# Patient Record
Sex: FEMALE | Race: BLACK OR AFRICAN AMERICAN | NOT HISPANIC OR LATINO
[De-identification: names, ages, dates, MRNs, and addresses within clinical notes are randomized per-mention and may not be internally consistent; named-entity substitution may affect disease eponyms.]

---

## 2017-01-11 ENCOUNTER — APPOINTMENT (OUTPATIENT)
Dept: INTERNAL MEDICINE | Facility: CLINIC | Age: 72
End: 2017-01-11

## 2017-01-11 ENCOUNTER — RESULT REVIEW (OUTPATIENT)
Age: 72
End: 2017-01-11

## 2017-01-11 VITALS
BODY MASS INDEX: 40.65 KG/M2 | HEART RATE: 84 BPM | WEIGHT: 259 LBS | DIASTOLIC BLOOD PRESSURE: 78 MMHG | SYSTOLIC BLOOD PRESSURE: 138 MMHG | HEIGHT: 67 IN

## 2017-01-11 DIAGNOSIS — Z85.41 PERSONAL HISTORY OF MALIGNANT NEOPLASM OF CERVIX UTERI: ICD-10-CM

## 2017-01-12 ENCOUNTER — FORM ENCOUNTER (OUTPATIENT)
Age: 72
End: 2017-01-12

## 2017-01-12 LAB
CHOLEST SERPL-MCNC: 109 MG/DL
CREAT UR-MCNC: 170 MG/DL
ESTIMATED AVERGAGE GLUCOSE (NORTH): 123 MG/DL
HBA1C MFR BLD: 5.9 %
HDLC SERPL-MCNC: 47 MG/DL
HDLC SERPL: 2.32
LDLC SERPL DIRECT ASSAY-MCNC: 43 MG/DL
MICROALBUMIN UR-MCNC: 7 MG/DL
MICROALBUMIN/CREAT UR-RTO: 41 UG/MG
T4 FREE SERPL-MCNC: 1.4 NG/DL
TRIGL SERPL-MCNC: 157 MG/DL
TSH SERPL DL<=0.005 MIU/L-ACNC: 2.4 UIU/ML
VLDLC SERPL-MCNC: 31 MG/DL

## 2017-01-13 LAB
25(OH)D3 SERPL-MCNC: 31 NG/ML
VITAMIN D2 SERPL-MCNC: <4 NG/ML
VITAMIN D3 SERPL-MCNC: 31 NG/ML

## 2017-01-18 LAB
ALBUMIN SERPL-MCNC: 4.2 G/DL
ALBUMIN/GLOB SERPL: 1.27
ALP SERPL-CCNC: 70 IU/L
ALT SERPL-CCNC: 21 IU/L
ANION GAP SERPL CALC-SCNC: 7 MEQ/L
AST SERPL-CCNC: 19 IU/L
BASOPHILS # BLD: 0.05 TH/MM3
BASOPHILS NFR BLD: 0.6 %
BILIRUB SERPL-MCNC: 0.3 MG/DL
BUN SERPL-MCNC: 19 MG/DL
BUN/CREAT SERPL: 18.4 %
CALCIUM SERPL-MCNC: 9.6 MG/DL
CHLORIDE SERPL-SCNC: 112 MEQ/L
CO2 SERPL-SCNC: 23 MEQ/L
CREAT SERPL-MCNC: 1.03 MG/DL
EOSINOPHIL # BLD: 0.12 TH/MM3
EOSINOPHIL NFR BLD: 1.5 %
ERYTHROCYTE [DISTWIDTH] IN BLOOD BY AUTOMATED COUNT: 15.6 %
GFR SERPL CREATININE-BSD FRML MDRD: 64
GLUCOSE SERPL-MCNC: 112 MG/DL
GRANULOCYTES # BLD: 5.89 TH/MM3
GRANULOCYTES NFR BLD: 72.4 %
HCT VFR BLD AUTO: 33.1 %
HGB BLD-MCNC: 11.3 G/DL
IMM GRANULOCYTES # BLD: 0.02 TH/MM3
IMM GRANULOCYTES NFR BLD: 0.2 %
LYMPHOCYTES # BLD: 1.55 TH/MM3
LYMPHOCYTES NFR BLD: 19 %
MCH RBC QN AUTO: 25.5 PG
MCHC RBC AUTO-ENTMCNC: 34.1 G/DL
MCV RBC AUTO: 74.7 FL
MONOCYTES # BLD: 0.51 TH/MM3
MONOCYTES NFR BLD: 6.3 %
PLATELET # BLD: 232 TH/MM3
PMV BLD AUTO: 12.8 FL
POTASSIUM SERPL-SCNC: 4.7 MMOL/L
PROT SERPL-MCNC: 7.5 G/DL
RBC # BLD AUTO: 4.43 MIL/MM3
SODIUM SERPL-SCNC: 142 MEQ/L
WBC # BLD: 8.14 TH/MM3

## 2017-03-27 ENCOUNTER — APPOINTMENT (OUTPATIENT)
Dept: INTERNAL MEDICINE | Facility: CLINIC | Age: 72
End: 2017-03-27

## 2017-04-21 ENCOUNTER — OTHER (OUTPATIENT)
Age: 72
End: 2017-04-21

## 2017-04-21 ENCOUNTER — APPOINTMENT (OUTPATIENT)
Dept: INTERNAL MEDICINE | Facility: CLINIC | Age: 72
End: 2017-04-21

## 2017-04-21 ENCOUNTER — OUTPATIENT (OUTPATIENT)
Dept: OUTPATIENT SERVICES | Facility: HOSPITAL | Age: 72
LOS: 1 days | Discharge: HOME | End: 2017-04-21

## 2017-04-21 VITALS
HEIGHT: 67 IN | DIASTOLIC BLOOD PRESSURE: 82 MMHG | HEART RATE: 90 BPM | WEIGHT: 261 LBS | BODY MASS INDEX: 40.97 KG/M2 | SYSTOLIC BLOOD PRESSURE: 138 MMHG

## 2017-04-21 DIAGNOSIS — Z85.3 PERSONAL HISTORY OF MALIGNANT NEOPLASM OF BREAST: ICD-10-CM

## 2017-05-24 LAB
ANION GAP SERPL CALC-SCNC: 11 MEQ/L
BASOPHILS # BLD: 0.05 TH/MM3
BASOPHILS NFR BLD: 0.6 %
BUN SERPL-MCNC: 14 MG/DL
BUN/CREAT SERPL: 16.3 %
CALCIUM SERPL-MCNC: 9.6 MG/DL
CHLORIDE SERPL-SCNC: 113 MEQ/L
CHOLEST SERPL-MCNC: 131 MG/DL
CO2 SERPL-SCNC: 21 MEQ/L
CREAT SERPL-MCNC: 0.86 MG/DL
DIFFERENTIAL METHOD BLD: NORMAL
EOSINOPHIL # BLD: 0.08 TH/MM3
EOSINOPHIL NFR BLD: 1 %
ERYTHROCYTE [DISTWIDTH] IN BLOOD BY AUTOMATED COUNT: 16.6 %
ESTIMATED AVERGAGE GLUCOSE (NORTH): 123 MG/DL
GFR SERPL CREATININE-BSD FRML MDRD: 79
GLUCOSE SERPL-MCNC: 111 MG/DL
GRANULOCYTES # BLD: 5.74 TH/MM3
GRANULOCYTES NFR BLD: 74.3 %
HBA1C MFR BLD: 5.9 %
HCT VFR BLD AUTO: 35.1 %
HDLC SERPL-MCNC: 47 MG/DL
HDLC SERPL: 2.79
HEMOCCULT STL QL IA: NEGATIVE
HGB BLD-MCNC: 12 G/DL
IMM GRANULOCYTES # BLD: 0.03 TH/MM3
IMM GRANULOCYTES NFR BLD: 0.4 %
LDLC SERPL DIRECT ASSAY-MCNC: 55 MG/DL
LYMPHOCYTES # BLD: 1.29 TH/MM3
LYMPHOCYTES NFR BLD: 16.7 %
MCH RBC QN AUTO: 25.5 PG
MCHC RBC AUTO-ENTMCNC: 34.2 G/DL
MCV RBC AUTO: 74.7 FL
MONOCYTES # BLD: 0.54 TH/MM3
MONOCYTES NFR BLD: 7 %
PLATELET # BLD: 188 TH/MM3
PMV BLD AUTO: 12.9 FL
POTASSIUM SERPL-SCNC: 5 MMOL/L
RBC # BLD AUTO: 4.7 MIL/MM3
SODIUM SERPL-SCNC: 145 MEQ/L
TRIGL SERPL-MCNC: 234 MG/DL
VLDLC SERPL-MCNC: 46 MG/DL
WBC # BLD: 7.73 TH/MM3

## 2017-05-31 ENCOUNTER — APPOINTMENT (OUTPATIENT)
Dept: INTERNAL MEDICINE | Facility: CLINIC | Age: 72
End: 2017-05-31

## 2017-05-31 ENCOUNTER — OUTPATIENT (OUTPATIENT)
Dept: OUTPATIENT SERVICES | Facility: HOSPITAL | Age: 72
LOS: 1 days | Discharge: HOME | End: 2017-05-31

## 2017-05-31 VITALS
BODY MASS INDEX: 38.45 KG/M2 | WEIGHT: 245 LBS | HEIGHT: 67 IN | DIASTOLIC BLOOD PRESSURE: 80 MMHG | SYSTOLIC BLOOD PRESSURE: 141 MMHG | HEART RATE: 89 BPM

## 2017-06-28 DIAGNOSIS — E11.9 TYPE 2 DIABETES MELLITUS WITHOUT COMPLICATIONS: ICD-10-CM

## 2017-06-28 DIAGNOSIS — J45.998 OTHER ASTHMA: ICD-10-CM

## 2017-06-28 DIAGNOSIS — E78.4 OTHER HYPERLIPIDEMIA: ICD-10-CM

## 2017-07-14 ENCOUNTER — OUTPATIENT (OUTPATIENT)
Dept: OUTPATIENT SERVICES | Facility: HOSPITAL | Age: 72
LOS: 1 days | Discharge: HOME | End: 2017-07-14

## 2017-07-14 DIAGNOSIS — R92.8 OTHER ABNORMAL AND INCONCLUSIVE FINDINGS ON DIAGNOSTIC IMAGING OF BREAST: ICD-10-CM

## 2017-09-01 ENCOUNTER — APPOINTMENT (OUTPATIENT)
Dept: INTERNAL MEDICINE | Facility: CLINIC | Age: 72
End: 2017-09-01

## 2017-09-01 ENCOUNTER — OUTPATIENT (OUTPATIENT)
Dept: OUTPATIENT SERVICES | Facility: HOSPITAL | Age: 72
LOS: 1 days | Discharge: HOME | End: 2017-09-01

## 2017-09-01 VITALS
BODY MASS INDEX: 40.34 KG/M2 | WEIGHT: 257 LBS | DIASTOLIC BLOOD PRESSURE: 72 MMHG | SYSTOLIC BLOOD PRESSURE: 136 MMHG | HEART RATE: 93 BPM | HEIGHT: 67 IN

## 2017-09-01 DIAGNOSIS — E66.9 OBESITY, UNSPECIFIED: ICD-10-CM

## 2017-09-01 DIAGNOSIS — M81.0 AGE-RELATED OSTEOPOROSIS W/OUT CURRENT PATHOLOGICAL FRACTURE: ICD-10-CM

## 2017-09-01 DIAGNOSIS — R09.82 POSTNASAL DRIP: ICD-10-CM

## 2017-09-05 DIAGNOSIS — I10 ESSENTIAL (PRIMARY) HYPERTENSION: ICD-10-CM

## 2017-09-05 DIAGNOSIS — J45.998 OTHER ASTHMA: ICD-10-CM

## 2017-09-05 DIAGNOSIS — J30.9 ALLERGIC RHINITIS, UNSPECIFIED: ICD-10-CM

## 2017-11-28 DIAGNOSIS — C50.919 MALIGNANT NEOPLASM OF UNSPECIFIED SITE OF UNSPECIFIED FEMALE BREAST: ICD-10-CM

## 2017-11-28 DIAGNOSIS — E78.4 OTHER HYPERLIPIDEMIA: ICD-10-CM

## 2017-12-01 ENCOUNTER — APPOINTMENT (OUTPATIENT)
Dept: INTERNAL MEDICINE | Facility: CLINIC | Age: 72
End: 2017-12-01

## 2018-01-22 ENCOUNTER — INPATIENT (INPATIENT)
Facility: HOSPITAL | Age: 73
LOS: 2 days | Discharge: HOME | End: 2018-01-25
Attending: INTERNAL MEDICINE

## 2018-01-30 DIAGNOSIS — E83.42 HYPOMAGNESEMIA: ICD-10-CM

## 2018-01-30 DIAGNOSIS — K56.690 OTHER PARTIAL INTESTINAL OBSTRUCTION: ICD-10-CM

## 2018-01-30 DIAGNOSIS — Z85.3 PERSONAL HISTORY OF MALIGNANT NEOPLASM OF BREAST: ICD-10-CM

## 2018-01-30 DIAGNOSIS — C79.51 SECONDARY MALIGNANT NEOPLASM OF BONE: ICD-10-CM

## 2018-01-30 DIAGNOSIS — M81.0 AGE-RELATED OSTEOPOROSIS WITHOUT CURRENT PATHOLOGICAL FRACTURE: ICD-10-CM

## 2018-01-30 DIAGNOSIS — N18.3 CHRONIC KIDNEY DISEASE, STAGE 3 (MODERATE): ICD-10-CM

## 2018-01-30 DIAGNOSIS — N88.8 OTHER SPECIFIED NONINFLAMMATORY DISORDERS OF CERVIX UTERI: ICD-10-CM

## 2018-01-30 DIAGNOSIS — N13.30 UNSPECIFIED HYDRONEPHROSIS: ICD-10-CM

## 2018-01-30 DIAGNOSIS — J44.1 CHRONIC OBSTRUCTIVE PULMONARY DISEASE WITH (ACUTE) EXACERBATION: ICD-10-CM

## 2018-01-30 DIAGNOSIS — R11.10 VOMITING, UNSPECIFIED: ICD-10-CM

## 2018-01-30 DIAGNOSIS — E66.01 MORBID (SEVERE) OBESITY DUE TO EXCESS CALORIES: ICD-10-CM

## 2018-01-30 DIAGNOSIS — E78.5 HYPERLIPIDEMIA, UNSPECIFIED: ICD-10-CM

## 2018-01-30 DIAGNOSIS — E83.51 HYPOCALCEMIA: ICD-10-CM

## 2018-01-30 DIAGNOSIS — E11.9 TYPE 2 DIABETES MELLITUS WITHOUT COMPLICATIONS: ICD-10-CM

## 2018-01-30 DIAGNOSIS — N17.9 ACUTE KIDNEY FAILURE, UNSPECIFIED: ICD-10-CM

## 2018-02-04 DIAGNOSIS — N17.9 ACUTE KIDNEY FAILURE, UNSPECIFIED: ICD-10-CM

## 2018-02-04 DIAGNOSIS — R19.00 INTRA-ABDOMINAL AND PELVIC SWELLING, MASS AND LUMP, UNSPECIFIED SITE: ICD-10-CM

## 2018-02-04 DIAGNOSIS — R11.10 VOMITING, UNSPECIFIED: ICD-10-CM

## 2018-02-14 ENCOUNTER — APPOINTMENT (OUTPATIENT)
Dept: INTERNAL MEDICINE | Facility: CLINIC | Age: 73
End: 2018-02-14

## 2018-02-14 ENCOUNTER — OUTPATIENT (OUTPATIENT)
Dept: OUTPATIENT SERVICES | Facility: HOSPITAL | Age: 73
LOS: 1 days | Discharge: HOME | End: 2018-02-14

## 2018-02-14 VITALS
HEART RATE: 108 BPM | HEIGHT: 67 IN | DIASTOLIC BLOOD PRESSURE: 83 MMHG | SYSTOLIC BLOOD PRESSURE: 150 MMHG | BODY MASS INDEX: 39.71 KG/M2 | WEIGHT: 253 LBS

## 2018-02-15 DIAGNOSIS — I10 ESSENTIAL (PRIMARY) HYPERTENSION: ICD-10-CM

## 2018-02-15 DIAGNOSIS — J44.9 CHRONIC OBSTRUCTIVE PULMONARY DISEASE, UNSPECIFIED: ICD-10-CM

## 2018-02-15 DIAGNOSIS — C53.9 MALIGNANT NEOPLASM OF CERVIX UTERI, UNSPECIFIED: ICD-10-CM

## 2018-02-21 ENCOUNTER — OUTPATIENT (OUTPATIENT)
Dept: OUTPATIENT SERVICES | Facility: HOSPITAL | Age: 73
LOS: 1 days | Discharge: HOME | End: 2018-02-21

## 2018-02-21 VITALS
OXYGEN SATURATION: 100 % | RESPIRATION RATE: 16 BRPM | SYSTOLIC BLOOD PRESSURE: 142 MMHG | HEART RATE: 82 BPM | HEIGHT: 67 IN | DIASTOLIC BLOOD PRESSURE: 73 MMHG | TEMPERATURE: 98 F | WEIGHT: 263.01 LBS

## 2018-02-21 DIAGNOSIS — C79.51 SECONDARY MALIGNANT NEOPLASM OF BONE: ICD-10-CM

## 2018-02-21 DIAGNOSIS — Z98.890 OTHER SPECIFIED POSTPROCEDURAL STATES: Chronic | ICD-10-CM

## 2018-02-21 DIAGNOSIS — R92.8 OTHER ABNORMAL AND INCONCLUSIVE FINDINGS ON DIAGNOSTIC IMAGING OF BREAST: ICD-10-CM

## 2018-02-21 DIAGNOSIS — Z01.818 ENCOUNTER FOR OTHER PREPROCEDURAL EXAMINATION: ICD-10-CM

## 2018-02-21 LAB
ALBUMIN SERPL ELPH-MCNC: 4.2 G/DL — SIGNIFICANT CHANGE UP (ref 3–5.5)
ALP SERPL-CCNC: 112 U/L — SIGNIFICANT CHANGE UP (ref 30–115)
ALT FLD-CCNC: 31 U/L — SIGNIFICANT CHANGE UP (ref 0–41)
ANION GAP SERPL CALC-SCNC: 9 MMOL/L — SIGNIFICANT CHANGE UP (ref 7–14)
APTT BLD: 25.5 SEC — LOW (ref 27–39.2)
AST SERPL-CCNC: 20 U/L — SIGNIFICANT CHANGE UP (ref 0–41)
BASOPHILS # BLD AUTO: 0.03 K/UL — SIGNIFICANT CHANGE UP (ref 0–0.2)
BASOPHILS NFR BLD AUTO: 0.5 % — SIGNIFICANT CHANGE UP (ref 0–1)
BILIRUB SERPL-MCNC: 0.3 MG/DL — SIGNIFICANT CHANGE UP (ref 0.2–1.2)
BUN SERPL-MCNC: 22 MG/DL — HIGH (ref 10–20)
CALCIUM SERPL-MCNC: 9.3 MG/DL — SIGNIFICANT CHANGE UP (ref 8.5–10.1)
CHLORIDE SERPL-SCNC: 111 MMOL/L — HIGH (ref 98–110)
CO2 SERPL-SCNC: 22 MMOL/L — SIGNIFICANT CHANGE UP (ref 17–32)
CREAT SERPL-MCNC: 1.1 MG/DL — SIGNIFICANT CHANGE UP (ref 0.7–1.5)
EOSINOPHIL # BLD AUTO: 0.06 K/UL — SIGNIFICANT CHANGE UP (ref 0–0.7)
EOSINOPHIL NFR BLD AUTO: 0.9 % — SIGNIFICANT CHANGE UP (ref 0–8)
GLUCOSE SERPL-MCNC: 106 MG/DL — SIGNIFICANT CHANGE UP (ref 70–110)
HCT VFR BLD CALC: 31.5 % — LOW (ref 37–47)
HGB BLD-MCNC: 10.7 G/DL — LOW (ref 14–18)
IMM GRANULOCYTES NFR BLD AUTO: 1.1 % — HIGH (ref 0.1–0.3)
INR BLD: 1 RATIO — SIGNIFICANT CHANGE UP (ref 0.65–1.3)
LYMPHOCYTES # BLD AUTO: 1.47 K/UL — SIGNIFICANT CHANGE UP (ref 1.2–3.4)
LYMPHOCYTES # BLD AUTO: 22.4 % — SIGNIFICANT CHANGE UP (ref 20.5–51.1)
MCHC RBC-ENTMCNC: 24.4 PG — LOW (ref 27–31)
MCHC RBC-ENTMCNC: 34 G/DL — SIGNIFICANT CHANGE UP (ref 32–37)
MCV RBC AUTO: 71.9 FL — LOW (ref 81–91)
MONOCYTES # BLD AUTO: 0.78 K/UL — HIGH (ref 0.1–0.6)
MONOCYTES NFR BLD AUTO: 11.9 % — HIGH (ref 1.7–9.3)
NEUTROPHILS # BLD AUTO: 4.16 K/UL — SIGNIFICANT CHANGE UP (ref 1.4–6.5)
NEUTROPHILS NFR BLD AUTO: 63.2 % — SIGNIFICANT CHANGE UP (ref 42.2–75.2)
NRBC # BLD: 0 /100 WBCS — SIGNIFICANT CHANGE UP (ref 0–0)
PLATELET # BLD AUTO: 236 K/UL — SIGNIFICANT CHANGE UP (ref 130–400)
POTASSIUM SERPL-MCNC: 4.2 MMOL/L — SIGNIFICANT CHANGE UP (ref 3.5–5)
POTASSIUM SERPL-SCNC: 4.2 MMOL/L — SIGNIFICANT CHANGE UP (ref 3.5–5)
PROT SERPL-MCNC: 7.8 G/DL — SIGNIFICANT CHANGE UP (ref 6–8)
PROTHROM AB SERPL-ACNC: 10.8 SEC — SIGNIFICANT CHANGE UP (ref 9.95–12.87)
RBC # BLD: 4.38 M/UL — SIGNIFICANT CHANGE UP (ref 4.2–5.4)
RBC # FLD: 15.9 % — HIGH (ref 11.5–14.5)
SODIUM SERPL-SCNC: 142 MMOL/L — SIGNIFICANT CHANGE UP (ref 135–146)
WBC # BLD: 6.57 K/UL — SIGNIFICANT CHANGE UP (ref 4.8–10.8)
WBC # FLD AUTO: 6.57 K/UL — SIGNIFICANT CHANGE UP (ref 4.8–10.8)

## 2018-02-21 NOTE — H&P PST ADULT - FAMILY HISTORY
Mother  Still living? No  Family history of breast cancer, Age at diagnosis: Age Unknown     Sibling  Still living? No  Family history of breast cancer, Age at diagnosis: Age Unknown

## 2018-02-21 NOTE — H&P PST ADULT - REASON FOR ADMISSION
73 yo female presents with HX CERVICAL& BREAST CANCERS, diagnosis of pelvic mass, now they ARE DOING SOME KIND OF BIOPSY

## 2018-02-21 NOTE — H&P PST ADULT - PMH
Asthma  last attack 1/2018- hospitalized& steroids  Breast cancer  left, radiation ~2 yrs ago  Cervical cancer  radiation& chemo ~15 yrs ago  COPD (chronic obstructive pulmonary disease)    Diabetes    Hypertension    Obesity  bmi 41  Osteoporosis

## 2018-03-27 ENCOUNTER — APPOINTMENT (OUTPATIENT)
Dept: INTERNAL MEDICINE | Facility: CLINIC | Age: 73
End: 2018-03-27

## 2018-03-27 ENCOUNTER — OUTPATIENT (OUTPATIENT)
Dept: OUTPATIENT SERVICES | Facility: HOSPITAL | Age: 73
LOS: 1 days | Discharge: HOME | End: 2018-03-27

## 2018-03-27 VITALS
HEIGHT: 67 IN | DIASTOLIC BLOOD PRESSURE: 81 MMHG | HEART RATE: 105 BPM | WEIGHT: 255 LBS | SYSTOLIC BLOOD PRESSURE: 146 MMHG | BODY MASS INDEX: 40.02 KG/M2

## 2018-03-27 DIAGNOSIS — Z98.890 OTHER SPECIFIED POSTPROCEDURAL STATES: Chronic | ICD-10-CM

## 2018-03-28 DIAGNOSIS — Z01.818 ENCOUNTER FOR OTHER PREPROCEDURAL EXAMINATION: ICD-10-CM

## 2018-03-28 DIAGNOSIS — R19.00 INTRA-ABDOMINAL AND PELVIC SWELLING, MASS AND LUMP, UNSPECIFIED SITE: ICD-10-CM

## 2018-03-28 DIAGNOSIS — I10 ESSENTIAL (PRIMARY) HYPERTENSION: ICD-10-CM

## 2018-05-08 ENCOUNTER — RX RENEWAL (OUTPATIENT)
Age: 73
End: 2018-05-08

## 2018-05-29 ENCOUNTER — FORM ENCOUNTER (OUTPATIENT)
Age: 73
End: 2018-05-29

## 2018-05-30 ENCOUNTER — APPOINTMENT (OUTPATIENT)
Dept: INTERNAL MEDICINE | Facility: CLINIC | Age: 73
End: 2018-05-30

## 2018-05-30 ENCOUNTER — OUTPATIENT (OUTPATIENT)
Dept: OUTPATIENT SERVICES | Facility: HOSPITAL | Age: 73
LOS: 1 days | Discharge: HOME | End: 2018-05-30

## 2018-05-30 VITALS
BODY MASS INDEX: 39.16 KG/M2 | SYSTOLIC BLOOD PRESSURE: 123 MMHG | DIASTOLIC BLOOD PRESSURE: 73 MMHG | HEART RATE: 101 BPM | WEIGHT: 250 LBS

## 2018-05-30 VITALS — HEIGHT: 67 IN

## 2018-05-30 DIAGNOSIS — Z98.890 OTHER SPECIFIED POSTPROCEDURAL STATES: Chronic | ICD-10-CM

## 2018-05-30 DIAGNOSIS — R19.00 INTRA-ABDOMINAL AND PELVIC SWELLING, MASS AND LUMP, UNSPECIFIED SITE: ICD-10-CM

## 2018-05-30 DIAGNOSIS — M79.643 PAIN IN UNSPECIFIED HAND: ICD-10-CM

## 2018-05-30 DIAGNOSIS — R01.1 CARDIAC MURMUR, UNSPECIFIED: ICD-10-CM

## 2018-05-30 DIAGNOSIS — M79.642 PAIN IN LEFT HAND: ICD-10-CM

## 2018-05-30 DIAGNOSIS — C50.919 MALIGNANT NEOPLASM OF UNSPECIFIED SITE OF UNSPECIFIED FEMALE BREAST: ICD-10-CM

## 2018-05-30 NOTE — HISTORY OF PRESENT ILLNESS
[FreeTextEntry1] : follow up [de-identified] : 72 F with PMH of DM II, COPD, DLD, HTN, osteoporosis presents for follow up. Since her last visit she was due to have a pelvic mass biopsy which was cancelled and she currently states she does not want to have it done anymore. Otherwise she states she feels well.\par spoke with patient at length agreed to go back to gyn

## 2018-05-30 NOTE — ASSESSMENT
[FreeTextEntry1] : 72F with PMH of COPD, DLD, DM II, HTN, Breast Cancer s/p resection and RT presents for follow up.\par \par Left Hand pain with tenderness over the middle digit at the metacarpophalangeal joint with reduced ROM due to pain\par check hand x-ray\par pain control with tylenol as needed\par \par Breast Ca\par mammogram and US\par \par HTN\par continue enalapril and amlodipine\par \par DLD\par continue simvastatin\par check lipid profile\par \par DM II\par continue metformin\par check hba1c\par \par Osteoporosis\par continue alendronate\par \par Follow up in 1 month.

## 2018-06-06 LAB
CHOLEST SERPL-MCNC: 118 MG/DL
CHOLEST/HDLC SERPL: 2.7 RATIO
CREAT SPEC-SCNC: 184 MG/DL
ESTIMATED AVERAGE GLUCOSE: 126 MG/DL
HBA1C MFR BLD HPLC: 6 %
HDLC SERPL-MCNC: 43 MG/DL
LDLC SERPL CALC-MCNC: 35 MG/DL
MICROALBUMIN 24H UR DL<=1MG/L-MCNC: 37.6 MG/DL
MICROALBUMIN/CREAT 24H UR-RTO: 204 MG/G
TRIGL SERPL-MCNC: 289 MG/DL

## 2018-06-20 ENCOUNTER — APPOINTMENT (OUTPATIENT)
Dept: INTERNAL MEDICINE | Facility: CLINIC | Age: 73
End: 2018-06-20

## 2018-06-20 ENCOUNTER — OUTPATIENT (OUTPATIENT)
Dept: OUTPATIENT SERVICES | Facility: HOSPITAL | Age: 73
LOS: 1 days | Discharge: HOME | End: 2018-06-20

## 2018-06-20 VITALS
HEIGHT: 67 IN | TEMPERATURE: 97.7 F | SYSTOLIC BLOOD PRESSURE: 154 MMHG | HEART RATE: 89 BPM | WEIGHT: 247 LBS | DIASTOLIC BLOOD PRESSURE: 85 MMHG | BODY MASS INDEX: 38.77 KG/M2

## 2018-06-20 DIAGNOSIS — Z98.890 OTHER SPECIFIED POSTPROCEDURAL STATES: Chronic | ICD-10-CM

## 2018-06-20 NOTE — HISTORY OF PRESENT ILLNESS
[de-identified] : 72 F with PMH of DM II, COPD, DLD, HTN, osteoporosis presents for follow up after one month. Denies other complaints.

## 2018-06-20 NOTE — PHYSICAL EXAM
[No Acute Distress] : no acute distress [Normal Sclera/Conjunctiva] : normal sclera/conjunctiva [Normal Oropharynx] : the oropharynx was normal [Supple] : supple [No Respiratory Distress] : no respiratory distress  [Clear to Auscultation] : lungs were clear to auscultation bilaterally [No Accessory Muscle Use] : no accessory muscle use [Normal Rate] : normal rate  [Regular Rhythm] : with a regular rhythm [Normal S1, S2] : normal S1 and S2 [No Murmur] : no murmur heard [Soft] : abdomen soft [Non Tender] : non-tender [Non-distended] : non-distended [No Masses] : no abdominal mass palpated [No HSM] : no HSM [Normal Bowel Sounds] : normal bowel sounds [No CVA Tenderness] : no CVA  tenderness [Normal Gait] : normal gait [Coordination Grossly Intact] : coordination grossly intact [No Focal Deficits] : no focal deficits [Normal Affect] : the affect was normal [Normal Insight/Judgement] : insight and judgment were intact

## 2018-06-21 DIAGNOSIS — C50.919 MALIGNANT NEOPLASM OF UNSPECIFIED SITE OF UNSPECIFIED FEMALE BREAST: ICD-10-CM

## 2018-06-21 DIAGNOSIS — E78.5 HYPERLIPIDEMIA, UNSPECIFIED: ICD-10-CM

## 2018-06-21 DIAGNOSIS — N85.9 NONINFLAMMATORY DISORDER OF UTERUS, UNSPECIFIED: ICD-10-CM

## 2018-07-17 PROBLEM — E66.9 OBESITY, UNSPECIFIED: Chronic | Status: ACTIVE | Noted: 2018-02-21

## 2018-08-16 PROBLEM — J44.9 CHRONIC OBSTRUCTIVE PULMONARY DISEASE, UNSPECIFIED: Chronic | Status: ACTIVE | Noted: 2018-02-21

## 2018-08-16 PROBLEM — M81.0 AGE-RELATED OSTEOPOROSIS WITHOUT CURRENT PATHOLOGICAL FRACTURE: Chronic | Status: ACTIVE | Noted: 2018-02-21

## 2018-08-16 PROBLEM — I10 ESSENTIAL (PRIMARY) HYPERTENSION: Chronic | Status: ACTIVE | Noted: 2018-02-21

## 2018-08-16 PROBLEM — C53.9 MALIGNANT NEOPLASM OF CERVIX UTERI, UNSPECIFIED: Chronic | Status: ACTIVE | Noted: 2018-02-21

## 2018-08-16 PROBLEM — E11.9 TYPE 2 DIABETES MELLITUS WITHOUT COMPLICATIONS: Chronic | Status: ACTIVE | Noted: 2018-02-21

## 2018-08-16 PROBLEM — C50.919 MALIGNANT NEOPLASM OF UNSPECIFIED SITE OF UNSPECIFIED FEMALE BREAST: Chronic | Status: ACTIVE | Noted: 2018-02-21

## 2018-08-16 PROBLEM — J45.909 UNSPECIFIED ASTHMA, UNCOMPLICATED: Chronic | Status: ACTIVE | Noted: 2018-02-21

## 2018-08-22 ENCOUNTER — OUTPATIENT (OUTPATIENT)
Dept: OUTPATIENT SERVICES | Facility: HOSPITAL | Age: 73
LOS: 1 days | Discharge: HOME | End: 2018-08-22

## 2018-08-22 DIAGNOSIS — Z98.890 OTHER SPECIFIED POSTPROCEDURAL STATES: Chronic | ICD-10-CM

## 2018-08-22 DIAGNOSIS — R92.8 OTHER ABNORMAL AND INCONCLUSIVE FINDINGS ON DIAGNOSTIC IMAGING OF BREAST: ICD-10-CM

## 2018-09-21 ENCOUNTER — APPOINTMENT (OUTPATIENT)
Dept: INTERNAL MEDICINE | Facility: CLINIC | Age: 73
End: 2018-09-21

## 2018-11-09 ENCOUNTER — APPOINTMENT (OUTPATIENT)
Dept: INTERNAL MEDICINE | Facility: CLINIC | Age: 73
End: 2018-11-09

## 2018-11-28 RX ORDER — ALENDRONATE SODIUM 70 MG/1
70 TABLET ORAL
Qty: 4 | Refills: 3 | Status: ACTIVE | COMMUNITY
Start: 2018-09-07 | End: 1900-01-01

## 2018-12-06 ENCOUNTER — OUTPATIENT (OUTPATIENT)
Dept: OUTPATIENT SERVICES | Facility: HOSPITAL | Age: 73
LOS: 1 days | Discharge: HOME | End: 2018-12-06

## 2018-12-06 ENCOUNTER — APPOINTMENT (OUTPATIENT)
Dept: INTERNAL MEDICINE | Facility: CLINIC | Age: 73
End: 2018-12-06

## 2018-12-06 VITALS
WEIGHT: 220 LBS | DIASTOLIC BLOOD PRESSURE: 78 MMHG | HEART RATE: 105 BPM | BODY MASS INDEX: 34.53 KG/M2 | SYSTOLIC BLOOD PRESSURE: 132 MMHG | TEMPERATURE: 97.6 F | HEIGHT: 67 IN

## 2018-12-06 DIAGNOSIS — C50.919 MALIGNANT NEOPLASM OF UNSPECIFIED SITE OF UNSPECIFIED FEMALE BREAST: ICD-10-CM

## 2018-12-06 DIAGNOSIS — Z98.890 OTHER SPECIFIED POSTPROCEDURAL STATES: Chronic | ICD-10-CM

## 2018-12-06 DIAGNOSIS — C55 MALIGNANT NEOPLASM OF UTERUS, PART UNSPECIFIED: ICD-10-CM

## 2018-12-06 DIAGNOSIS — J44.9 CHRONIC OBSTRUCTIVE PULMONARY DISEASE, UNSPECIFIED: ICD-10-CM

## 2018-12-06 NOTE — ASSESSMENT
[FreeTextEntry1] : 72 F with PMH of DM II, COPD, DLD, HTN, osteoporosis presents for follow up appointment. \par no present complaints.\par mentions that, patient was diagnosed with uterine cancer and started on neoadjuvant chemotherapy. ( Paclitaxel and carboplatin).\par \par # Uterine cancer: \par -continue chemotherapy\par - f/u with oncology\par - f/u with gyn\par - get the records from [facility]\par \par # Breast cancer: \par - repeat diagnostic mammogram and diagnostic SONO breast b/l\par \par # HTN:\par - refill sent.\par - htn well controlled\par \par # DLD\par - refill sent\par - f/u lipid panel\par \par #DM2\par - f/u A1C\par - cw with current meds\par \par HCM:\par refuses flu shot\par f/u in 3 months\par \par \par \par

## 2018-12-06 NOTE — PHYSICAL EXAM
[No Acute Distress] : no acute distress [Normal Sclera/Conjunctiva] : normal sclera/conjunctiva [Normal Oropharynx] : the oropharynx was normal [Supple] : supple [No Respiratory Distress] : no respiratory distress  [Clear to Auscultation] : lungs were clear to auscultation bilaterally [No Accessory Muscle Use] : no accessory muscle use [Normal Rate] : normal rate  [Regular Rhythm] : with a regular rhythm [Normal S1, S2] : normal S1 and S2 [No Murmur] : no murmur heard [Soft] : abdomen soft [Non Tender] : non-tender [Normal Bowel Sounds] : normal bowel sounds [No CVA Tenderness] : no CVA  tenderness [Normal Gait] : normal gait [Coordination Grossly Intact] : coordination grossly intact [No Focal Deficits] : no focal deficits [Normal Affect] : the affect was normal [Normal Insight/Judgement] : insight and judgment were intact [de-identified] : distended lower belly

## 2018-12-06 NOTE — HISTORY OF PRESENT ILLNESS
[de-identified] : 72 F with PMH of DM II, COPD, DLD, HTN, osteoporosis presents for follow up appointment. \par no present complaints.\par mentions that, patient was diagnosed with uterine cancer and started on neoadjuvant chemotherapy. ( Paclitaxel and carboplatin).

## 2018-12-06 NOTE — PLAN
[FreeTextEntry1] : 72F with PMH of COPD, DLD, DM II, HTN, Breast Cancer s/p resection and RT presents for follow up.\par \par #)As per patient she had bone scan CT scan abd which was done outside oncology office Dr. Paris spoke with oncology over phone plan to do biopsy for pelvic mass at [facility] Dr. Cerrato they will refer pt to gyn/onc or IR for biopsy asked Dr Cerrato office to fax results\par -Bone scan showed heterogenous activity in thoracolumbar spine likely metastatic disease.\par \par #)Left Hand pain resolved\par Xray showed basal joint,mild PIP, DIP degenerative changes\par \par #)Breast Ca\par repeat mammogram and US in August 2018.\par \par #)HTN repeat BP is 135/80\par continue enalapril and amlodipine\par \par #)DLD\par continue simvastatin\par LDL is 35, \par \par #)DM II\par continue metformin, Aspirin\par HbA1c is 6.0\par Increased microalbumin to creatinine ratio c/w enalapril\par \par #)Osteoporosis\par continue alendronate\par \par Follow up in 3 month. \par

## 2019-01-01 ENCOUNTER — OUTPATIENT (OUTPATIENT)
Dept: OUTPATIENT SERVICES | Facility: HOSPITAL | Age: 74
LOS: 1 days | Discharge: HOME | End: 2019-01-01

## 2019-01-01 ENCOUNTER — APPOINTMENT (OUTPATIENT)
Dept: INTERNAL MEDICINE | Facility: CLINIC | Age: 74
End: 2019-01-01
Payer: MEDICARE

## 2019-01-01 ENCOUNTER — APPOINTMENT (OUTPATIENT)
Dept: INTERNAL MEDICINE | Facility: CLINIC | Age: 74
End: 2019-01-01

## 2019-01-01 VITALS
BODY MASS INDEX: 28.88 KG/M2 | SYSTOLIC BLOOD PRESSURE: 124 MMHG | HEIGHT: 67 IN | DIASTOLIC BLOOD PRESSURE: 66 MMHG | WEIGHT: 184 LBS | HEART RATE: 105 BPM

## 2019-01-01 DIAGNOSIS — Z98.890 OTHER SPECIFIED POSTPROCEDURAL STATES: Chronic | ICD-10-CM

## 2019-01-01 DIAGNOSIS — I10 ESSENTIAL (PRIMARY) HYPERTENSION: ICD-10-CM

## 2019-01-01 DIAGNOSIS — M54.5 LOW BACK PAIN: ICD-10-CM

## 2019-01-01 DIAGNOSIS — Z85.3 PERSONAL HISTORY OF MALIGNANT NEOPLASM OF BREAST: ICD-10-CM

## 2019-01-01 DIAGNOSIS — R00.0 TACHYCARDIA, UNSPECIFIED: ICD-10-CM

## 2019-01-01 DIAGNOSIS — J45.909 UNSPECIFIED ASTHMA, UNCOMPLICATED: ICD-10-CM

## 2019-01-01 DIAGNOSIS — C55 MALIGNANT NEOPLASM OF UTERUS, PART UNSPECIFIED: ICD-10-CM

## 2019-01-01 DIAGNOSIS — Z85.41 PERSONAL HISTORY OF MALIGNANT NEOPLASM OF CERVIX UTERI: ICD-10-CM

## 2019-01-01 DIAGNOSIS — G89.29 LOW BACK PAIN: ICD-10-CM

## 2019-01-01 DIAGNOSIS — K56.609 UNSPECIFIED INTESTINAL OBSTRUCTION, UNSPECIFIED AS TO PARTIAL VERSUS COMPLETE OBSTRUCTION: ICD-10-CM

## 2019-01-01 PROCEDURE — 99213 OFFICE O/P EST LOW 20 MIN: CPT | Mod: GC

## 2019-01-01 RX ORDER — APIXABAN 5 MG/1
5 TABLET, FILM COATED ORAL
Qty: 60 | Refills: 1 | Status: ACTIVE | COMMUNITY

## 2019-02-21 ENCOUNTER — FORM ENCOUNTER (OUTPATIENT)
Age: 74
End: 2019-02-21

## 2019-02-22 ENCOUNTER — LABORATORY RESULT (OUTPATIENT)
Age: 74
End: 2019-02-22

## 2019-02-22 ENCOUNTER — OUTPATIENT (OUTPATIENT)
Dept: OUTPATIENT SERVICES | Facility: HOSPITAL | Age: 74
LOS: 1 days | Discharge: HOME | End: 2019-02-22

## 2019-02-22 DIAGNOSIS — Z98.890 OTHER SPECIFIED POSTPROCEDURAL STATES: Chronic | ICD-10-CM

## 2019-02-22 DIAGNOSIS — R92.8 OTHER ABNORMAL AND INCONCLUSIVE FINDINGS ON DIAGNOSTIC IMAGING OF BREAST: ICD-10-CM

## 2019-02-27 LAB
ALBUMIN SERPL ELPH-MCNC: 4 G/DL
ALP BLD-CCNC: 130 U/L
ALT SERPL-CCNC: 21 U/L
ANION GAP SERPL CALC-SCNC: 18 MMOL/L
AST SERPL-CCNC: 16 U/L
BILIRUB SERPL-MCNC: 0.4 MG/DL
BUN SERPL-MCNC: 18 MG/DL
CALCIUM SERPL-MCNC: 9 MG/DL
CHLORIDE SERPL-SCNC: 107 MMOL/L
CHOLEST SERPL-MCNC: 85 MG/DL
CHOLEST/HDLC SERPL: 2.6 RATIO
CO2 SERPL-SCNC: 15 MMOL/L
CREAT SERPL-MCNC: 1.1 MG/DL
GLUCOSE SERPL-MCNC: 90 MG/DL
HDLC SERPL-MCNC: 33 MG/DL
LDLC SERPL CALC-MCNC: 26 MG/DL
POTASSIUM SERPL-SCNC: 4.7 MMOL/L
PROT SERPL-MCNC: 7.5 G/DL
SODIUM SERPL-SCNC: 140 MMOL/L
TRIGL SERPL-MCNC: 192 MG/DL

## 2019-03-06 ENCOUNTER — EMERGENCY (EMERGENCY)
Facility: HOSPITAL | Age: 74
LOS: 0 days | Discharge: HOME | End: 2019-03-06
Attending: EMERGENCY MEDICINE | Admitting: EMERGENCY MEDICINE

## 2019-03-06 ENCOUNTER — APPOINTMENT (OUTPATIENT)
Dept: INTERNAL MEDICINE | Facility: CLINIC | Age: 74
End: 2019-03-06

## 2019-03-06 ENCOUNTER — OUTPATIENT (OUTPATIENT)
Dept: OUTPATIENT SERVICES | Facility: HOSPITAL | Age: 74
LOS: 1 days | Discharge: HOME | End: 2019-03-06

## 2019-03-06 VITALS
WEIGHT: 208 LBS | HEIGHT: 67 IN | TEMPERATURE: 97.7 F | SYSTOLIC BLOOD PRESSURE: 91 MMHG | DIASTOLIC BLOOD PRESSURE: 62 MMHG | HEART RATE: 122 BPM | BODY MASS INDEX: 32.65 KG/M2

## 2019-03-06 VITALS
HEART RATE: 113 BPM | OXYGEN SATURATION: 100 % | TEMPERATURE: 97 F | DIASTOLIC BLOOD PRESSURE: 60 MMHG | RESPIRATION RATE: 20 BRPM | SYSTOLIC BLOOD PRESSURE: 139 MMHG

## 2019-03-06 VITALS
DIASTOLIC BLOOD PRESSURE: 62 MMHG | HEART RATE: 108 BPM | OXYGEN SATURATION: 99 % | RESPIRATION RATE: 18 BRPM | TEMPERATURE: 98 F | SYSTOLIC BLOOD PRESSURE: 128 MMHG

## 2019-03-06 DIAGNOSIS — J45.909 UNSPECIFIED ASTHMA, UNCOMPLICATED: ICD-10-CM

## 2019-03-06 DIAGNOSIS — R00.0 TACHYCARDIA, UNSPECIFIED: ICD-10-CM

## 2019-03-06 DIAGNOSIS — Z98.890 OTHER SPECIFIED POSTPROCEDURAL STATES: Chronic | ICD-10-CM

## 2019-03-06 DIAGNOSIS — Z79.02 LONG TERM (CURRENT) USE OF ANTITHROMBOTICS/ANTIPLATELETS: ICD-10-CM

## 2019-03-06 DIAGNOSIS — Z88.0 ALLERGY STATUS TO PENICILLIN: ICD-10-CM

## 2019-03-06 DIAGNOSIS — Z88.8 ALLERGY STATUS TO OTHER DRUGS, MEDICAMENTS AND BIOLOGICAL SUBSTANCES STATUS: ICD-10-CM

## 2019-03-06 DIAGNOSIS — I10 ESSENTIAL (PRIMARY) HYPERTENSION: ICD-10-CM

## 2019-03-06 DIAGNOSIS — E11.9 TYPE 2 DIABETES MELLITUS WITHOUT COMPLICATIONS: ICD-10-CM

## 2019-03-06 DIAGNOSIS — E11.9 TYPE 2 DIABETES MELLITUS W/OUT COMPLICATIONS: ICD-10-CM

## 2019-03-06 DIAGNOSIS — Z90.49 ACQUIRED ABSENCE OF OTHER SPECIFIED PARTS OF DIGESTIVE TRACT: ICD-10-CM

## 2019-03-06 DIAGNOSIS — C50.919 MALIGNANT NEOPLASM OF UNSPECIFIED SITE OF UNSPECIFIED FEMALE BREAST: ICD-10-CM

## 2019-03-06 DIAGNOSIS — Z87.891 PERSONAL HISTORY OF NICOTINE DEPENDENCE: ICD-10-CM

## 2019-03-06 DIAGNOSIS — R92.8 OTHER ABNORMAL AND INCONCLUSIVE FINDINGS ON DIAGNOSTIC IMAGING OF BREAST: ICD-10-CM

## 2019-03-06 DIAGNOSIS — C55 MALIGNANT NEOPLASM OF UTERUS, PART UNSPECIFIED: ICD-10-CM

## 2019-03-06 DIAGNOSIS — N39.0 URINARY TRACT INFECTION, SITE NOT SPECIFIED: ICD-10-CM

## 2019-03-06 DIAGNOSIS — J44.9 CHRONIC OBSTRUCTIVE PULMONARY DISEASE, UNSPECIFIED: ICD-10-CM

## 2019-03-06 DIAGNOSIS — Z90.12 ACQUIRED ABSENCE OF LEFT BREAST AND NIPPLE: ICD-10-CM

## 2019-03-06 DIAGNOSIS — Z03.89 ENCOUNTER FOR OBSERVATION FOR OTHER SUSPECTED DISEASES AND CONDITIONS RULED OUT: ICD-10-CM

## 2019-03-06 DIAGNOSIS — Z79.82 LONG TERM (CURRENT) USE OF ASPIRIN: ICD-10-CM

## 2019-03-06 LAB
ANION GAP SERPL CALC-SCNC: 15 MMOL/L — HIGH (ref 7–14)
APPEARANCE UR: ABNORMAL
BACTERIA # UR AUTO: ABNORMAL /HPF
BASOPHILS # BLD AUTO: 0.06 K/UL — SIGNIFICANT CHANGE UP (ref 0–0.2)
BASOPHILS NFR BLD AUTO: 0.6 % — SIGNIFICANT CHANGE UP (ref 0–1)
BILIRUB UR-MCNC: NEGATIVE — SIGNIFICANT CHANGE UP
BUN SERPL-MCNC: 29 MG/DL — HIGH (ref 10–20)
CALCIUM SERPL-MCNC: 9.6 MG/DL — SIGNIFICANT CHANGE UP (ref 8.5–10.1)
CHLORIDE SERPL-SCNC: 107 MMOL/L — SIGNIFICANT CHANGE UP (ref 98–110)
CO2 SERPL-SCNC: 15 MMOL/L — LOW (ref 17–32)
COLOR SPEC: SIGNIFICANT CHANGE UP
CREAT SERPL-MCNC: 1.3 MG/DL — SIGNIFICANT CHANGE UP (ref 0.7–1.5)
DIFF PNL FLD: ABNORMAL
EOSINOPHIL # BLD AUTO: 0.13 K/UL — SIGNIFICANT CHANGE UP (ref 0–0.7)
EOSINOPHIL NFR BLD AUTO: 1.3 % — SIGNIFICANT CHANGE UP (ref 0–8)
EPI CELLS # UR: ABNORMAL /HPF
GLUCOSE SERPL-MCNC: 119 MG/DL — HIGH (ref 70–99)
GLUCOSE UR QL: NEGATIVE — SIGNIFICANT CHANGE UP
HCT VFR BLD CALC: 32.1 % — LOW (ref 37–47)
HGB BLD-MCNC: 10.9 G/DL — LOW (ref 12–16)
IMM GRANULOCYTES NFR BLD AUTO: 0.3 % — SIGNIFICANT CHANGE UP (ref 0.1–0.3)
KETONES UR-MCNC: NEGATIVE — SIGNIFICANT CHANGE UP
LEUKOCYTE ESTERASE UR-ACNC: ABNORMAL
LYMPHOCYTES # BLD AUTO: 1.6 K/UL — SIGNIFICANT CHANGE UP (ref 1.2–3.4)
LYMPHOCYTES # BLD AUTO: 16.5 % — LOW (ref 20.5–51.1)
MAGNESIUM SERPL-MCNC: 1.6 MG/DL — LOW (ref 1.8–2.4)
MCHC RBC-ENTMCNC: 25.2 PG — LOW (ref 27–31)
MCHC RBC-ENTMCNC: 34 G/DL — SIGNIFICANT CHANGE UP (ref 32–37)
MCV RBC AUTO: 74.1 FL — LOW (ref 81–99)
MONOCYTES # BLD AUTO: 0.57 K/UL — SIGNIFICANT CHANGE UP (ref 0.1–0.6)
MONOCYTES NFR BLD AUTO: 5.9 % — SIGNIFICANT CHANGE UP (ref 1.7–9.3)
NEUTROPHILS # BLD AUTO: 7.31 K/UL — HIGH (ref 1.4–6.5)
NEUTROPHILS NFR BLD AUTO: 75.4 % — HIGH (ref 42.2–75.2)
NITRITE UR-MCNC: POSITIVE
NRBC # BLD: 0 /100 WBCS — SIGNIFICANT CHANGE UP (ref 0–0)
PH UR: 5.5 — SIGNIFICANT CHANGE UP (ref 5–8)
PLATELET # BLD AUTO: 332 K/UL — SIGNIFICANT CHANGE UP (ref 130–400)
POTASSIUM SERPL-MCNC: 5 MMOL/L — SIGNIFICANT CHANGE UP (ref 3.5–5)
POTASSIUM SERPL-SCNC: 5 MMOL/L — SIGNIFICANT CHANGE UP (ref 3.5–5)
PROT UR-MCNC: >=300
RBC # BLD: 4.33 M/UL — SIGNIFICANT CHANGE UP (ref 4.2–5.4)
RBC # FLD: 17.7 % — HIGH (ref 11.5–14.5)
RBC CASTS # UR COMP ASSIST: ABNORMAL /HPF
SODIUM SERPL-SCNC: 137 MMOL/L — SIGNIFICANT CHANGE UP (ref 135–146)
SP GR SPEC: >=1.03 — SIGNIFICANT CHANGE UP (ref 1.01–1.03)
UROBILINOGEN FLD QL: 0.2 — SIGNIFICANT CHANGE UP (ref 0.2–0.2)
WBC # BLD: 9.7 K/UL — SIGNIFICANT CHANGE UP (ref 4.8–10.8)
WBC # FLD AUTO: 9.7 K/UL — SIGNIFICANT CHANGE UP (ref 4.8–10.8)
WBC UR QL: >50 /HPF

## 2019-03-06 RX ORDER — FLUTICASONE PROPIONATE AND SALMETEROL 50; 100 UG/1; UG/1
100-50 POWDER RESPIRATORY (INHALATION)
Qty: 1 | Refills: 5 | Status: ACTIVE | COMMUNITY
Start: 2017-05-31 | End: 1900-01-01

## 2019-03-06 RX ORDER — TAMOXIFEN CITRATE 20 MG/1
20 TABLET, FILM COATED ORAL
Refills: 0 | Status: ACTIVE | COMMUNITY
Start: 2019-03-06

## 2019-03-06 RX ORDER — SODIUM CHLORIDE 9 MG/ML
1000 INJECTION INTRAMUSCULAR; INTRAVENOUS; SUBCUTANEOUS
Qty: 0 | Refills: 0 | Status: DISCONTINUED | OUTPATIENT
Start: 2019-03-06 | End: 2019-03-06

## 2019-03-06 RX ORDER — FLUTICASONE PROPIONATE 50 UG/1
50 SPRAY, METERED NASAL TWICE DAILY
Qty: 1 | Refills: 3 | Status: DISCONTINUED | COMMUNITY
Start: 2017-09-01 | End: 2019-03-06

## 2019-03-06 RX ORDER — MEGESTROL ACETATE 20 MG/1
20 TABLET ORAL
Refills: 0 | Status: ACTIVE | COMMUNITY
Start: 2019-03-06

## 2019-03-06 RX ORDER — NITROFURANTOIN MACROCRYSTAL 50 MG
1 CAPSULE ORAL
Qty: 14 | Refills: 0
Start: 2019-03-06 | End: 2019-03-12

## 2019-03-06 RX ORDER — AMLODIPINE BESYLATE 5 MG/1
5 TABLET ORAL DAILY
Qty: 1 | Refills: 1 | Status: ACTIVE | COMMUNITY
Start: 2018-03-09 | End: 1900-01-01

## 2019-03-06 RX ADMIN — SODIUM CHLORIDE 125 MILLILITER(S): 9 INJECTION INTRAMUSCULAR; INTRAVENOUS; SUBCUTANEOUS at 12:51

## 2019-03-06 NOTE — ED PROVIDER NOTE - PHYSICAL EXAMINATION
Vital Signs: I have reviewed the initial vital signs.  Constitutional: well-nourished, appears stated age, no acute distress, sitting on a stretcher, smiling, vary lively, NAD  HEENT: PERRL, nml phonation, mild dry oral mucosa  Neck: supple , painel  ROM, no palpable masses  Cardiovascular: regular rate, regular rhythm, well-perfused extremities, capiullary refill <  2sec, distal pulses intact  Respiratory: unlabored respiratory effort, clear to auscultation bilaterally, equal air entry, speaking full sentences  Gastrointestinal: soft, non-tender abdomen, no pulsatile mass, BS present in all quadrants, no CVA ttp,   Musculoskeletal: supple neck, no lower extremity edema, no calf ttp  Integumentary: warm, dry, no rash  Neurologic: awake, alert x3, no focal neuro deficits  Psychiatric: appropriate mood, appropriate affect

## 2019-03-06 NOTE — ED PROVIDER NOTE - PROGRESS NOTE DETAILS
Patient appears well, NAD, annoyed for being in ED , because " there is nothing wrong with me".  Her only complaint is that she is hungry and wants to eat something.  Will check labs, give fluids, ECG shows sinus tachycardia @11.  When I spoke with patient in ED, her HR was in the 90s, she told me that I do not make her nervous and that she is " fine", but agrees to labs and fluid. The patient is resting comfortably, denies any complaints, ate a tray of food.  UA is positive , will treat with abx, patient is eager to go home. The patient is getting impatient, wants to go home now, will take a cab, the more upset she is the higher her HR.  She is without complaitns, appears well, is A&Ox3 and has capacity.  I discussed with her my concern over her elevated HR, she understands, but wants to go anyway.  Advised to follow up with her PMD, strict return precautions given.  She verbalized understanding and is amenable with the plan. Patient to be discharged from ED. Any available test results were discussed with patient . Verbal instructions given, including instructions to return to ED immediately for any new, worsening, or concerning symptoms. Patient endorsed understanding. Written discharge instructions additionally given, including follow-up plan.  Patient was given opportunity to ask questions.  Patient to be discharged from ED. Any available test results were discussed with patient and/or family. Verbal instructions given, including instructions to return to ED immediately for any new, worsening, or concerning symptoms. Patient endorsed understanding. Written discharge instructions additionally given, including follow-up plan.  Patient was given opportunity to ask questions.

## 2019-03-06 NOTE — ED PROVIDER NOTE - OBJECTIVE STATEMENT
74 yo female h/o COPD not on home oxygen and not a smoker, uterine cancer of oral therapy,  obesity, DM, HTN sent by her PMD for evaluation of " abnormal ECG".  Patient went for a routine visit today, says her doctor told her she is dehydrated and sent her to ED for evaluation,  She denies any complaints,  states her doctor " makes her nervous " and that is why her ECG was not normal.  She denies any recent illness, no headache, CP, SOB, cough, fever, chills, change in exercise tolerance, no  leg pain or swelling, no N/V/D/ no black or bloody stools, no vaginal bleeding or any other additional complaints.

## 2019-03-06 NOTE — ED PROVIDER NOTE - CLINICAL SUMMARY MEDICAL DECISION MAKING FREE TEXT BOX
74 yo female without complaints, sent by PMD for evaluation due to elevated HR.  Found to have UTI, Rx for abx sent to he pharmacy, patient insisted on going home.

## 2019-03-06 NOTE — ASSESSMENT
[FreeTextEntry1] : 73 F with PMH of Cervical, Breast and Uterine CA, DM II, COPD, DLD, HTN, osteoporosis presents for follow up appointment. \par \par # Sinus tachycardia\par -  and EKG- sinus tachy 160's\par - Dry appearing\par - Pt needs IVF\par - Will send to ED for IVF and to r/o pulmonary embolus\par \par # Hypotension in clinic\par - BP 91/62\par - rpt 110/70\par - Hold antihypertensives \par \par # Uterine cancer: \par - off chemo and started on Tamoxifen & Megestrol\par - f/u with oncology\par - f/u with gyn\par \par # Breast cancer: \par - repeat  diagnostic SONO breast b/l- wnl\par - Mammogram Diagnositc reordered \par \par # Microytic Anemia\par -Check Iron studies \par \par # DLD\par -  ( 289 from 5/18)\par - LDL 26\par \par #DM2\par - f/u A1C- 5.4\par - d/c Metformin\par - Pt has had a weight loss 49 pounds since Sep 2017\par \par #HCM:\par refuses flu shot\par f/u in 3 months\par \par

## 2019-03-06 NOTE — PHYSICAL EXAM
[No Acute Distress] : no acute distress [Well Nourished] : well nourished [Well Developed] : well developed [No Respiratory Distress] : no respiratory distress  [Clear to Auscultation] : lungs were clear to auscultation bilaterally [No Accessory Muscle Use] : no accessory muscle use [Regular Rhythm] : with a regular rhythm [Normal S1, S2] : normal S1 and S2 [Soft] : abdomen soft [Non Tender] : non-tender [No HSM] : no HSM [No Spinal Tenderness] : no spinal tenderness [No Rash] : no rash [de-identified] : Dry mucous membranes  [de-identified] : Tachycardiac  [de-identified] : Ambulates with a walker

## 2019-03-06 NOTE — ED PROVIDER NOTE - NS ED ROS FT
Constitutional: (-) fever, (-) chills, (-) weight loss, (-) fatigue or any changes in baseline , (-) weight loss  Eyes/ENT: (-) blurry vision, (-) epistaxis, (-) sore throat  Cardiovascular: (-) chest pain, (-) syncope, (-) palpitations, (-) change in exercise tolerance  Respiratory: (-) cough, (-) shortness of breath, (-) hemoptysis  Gastrointestinal: (-) nausea or vomiting, (-) diarrhea, (-) back or bloody stools, (-) flank pain  : (-) dysuria/frequency or hematuria  Musculoskeletal: (-) neck pain, (-) back pain, (-) new joint pain  Integumentary: (-) rash, (-) edema  Neurological: (-) headache, (-) altered mental status, (-) focal weakness or paresthesias  Allergic/Immunologic: (-) pruritus

## 2019-03-06 NOTE — HISTORY OF PRESENT ILLNESS
[de-identified] : 73 F with PMH of Cervical, Breast and Uterine CA, DM II, COPD, DLD, HTN, osteoporosis presents for follow up appointment. \par \par Pt's uterine cancer was treated withneoadjuvant chemotherapy. ( Paclitaxel and carboplatin). However, she couldn’t tolerate it. Per Hemeon note, pt was admitted to [facility] with symptomatic hypomagnesemia and hypocalcemia. Additionally, she has sclerotic metastases. Pt is been seen by Oncology @ [facility]- Dr. Sarah España. She is also following up with Dr. Salazar for her electrolyte abnormalities. Pt has lost significant weight 250--208\par \par Breast U/S (2019)\par Impression:   Area of fat necrosis in the left breast is benign.   Recommendation: Unless otherwise indicated by clinical findings, annual  screening mammography recommended. Bilateral mammogram is due in August 2019  BI-RADS Category 2: Benign   The above findings and recommendations were discussed with the patient at  the time of the examination.

## 2019-06-25 ENCOUNTER — OUTPATIENT (OUTPATIENT)
Dept: OUTPATIENT SERVICES | Facility: HOSPITAL | Age: 74
LOS: 1 days | Discharge: HOME | End: 2019-06-25

## 2019-06-25 DIAGNOSIS — C55 MALIGNANT NEOPLASM OF UTERUS, PART UNSPECIFIED: ICD-10-CM

## 2019-06-25 DIAGNOSIS — Z98.890 OTHER SPECIFIED POSTPROCEDURAL STATES: Chronic | ICD-10-CM

## 2019-06-26 LAB
FERRITIN SERPL-MCNC: 338 NG/ML
IRON SATN MFR SERPL: 18 %
IRON SERPL-MCNC: 51 UG/DL
RBC # BLD: 3.71 M/UL
RETICS # AUTO: 1.5 %
RETICS AGGREG/RBC NFR: 54.9 K/UL
TIBC SERPL-MCNC: 279 UG/DL
TRANSFERRIN SERPL-MCNC: 234 MG/DL
UIBC SERPL-MCNC: 228 UG/DL

## 2019-07-01 ENCOUNTER — OUTPATIENT (OUTPATIENT)
Dept: OUTPATIENT SERVICES | Facility: HOSPITAL | Age: 74
LOS: 1 days | Discharge: HOME | End: 2019-07-01

## 2019-07-01 ENCOUNTER — APPOINTMENT (OUTPATIENT)
Dept: INTERNAL MEDICINE | Facility: CLINIC | Age: 74
End: 2019-07-01

## 2019-07-01 VITALS
DIASTOLIC BLOOD PRESSURE: 59 MMHG | SYSTOLIC BLOOD PRESSURE: 103 MMHG | HEART RATE: 132 BPM | WEIGHT: 197 LBS | HEIGHT: 67 IN | BODY MASS INDEX: 30.92 KG/M2

## 2019-07-01 DIAGNOSIS — Z98.890 OTHER SPECIFIED POSTPROCEDURAL STATES: Chronic | ICD-10-CM

## 2019-07-01 NOTE — HISTORY OF PRESENT ILLNESS
[FreeTextEntry1] : ashleigh follow up [de-identified] : 74 y/o FM w/ PMHx of Breast ca (ER+/HI+) s/p lumpectomy, Uterine ca and cervical cancer w/ mets to the bone, HTN, COPD, DLD and osteoporosis presents for routien visit. PT was recently admitted at [facility] for sinus tachycardia and hypotension but pt did not want to be evaluated for PE (CTA or V/Q  scan) and pt was discharged without anticoagulation. Pt was initially on megesterol and tamoxifen but w/ high index of suspciion of PE pt was switched to Carboplatin and paxel chemotherapy. Pt's last treatment was last wednesday. Her SE only include diarrhea. Pt was also advised to stop her BP medciations as well as metformin but pt is non compliant.  Pt has no other complaints at this time. She does not want any meds to be adjusted

## 2019-07-01 NOTE — PHYSICAL EXAM
[No Acute Distress] : no acute distress [Well Developed] : well developed [No Respiratory Distress] : no respiratory distress  [Normal S1, S2] : normal S1 and S2 [Soft] : abdomen soft [de-identified] : tachycardia [de-identified] : distended. no rigidity or guarding

## 2019-07-01 NOTE — ASSESSMENT
[FreeTextEntry1] : 72 y/o FM w/ PMHx of Breast ca (ER+/CT+) s/p lumpectomy, Uterine ca and cervical cancer w/ mets to the bone, HTN, COPD, DLD and osteoporosis presents for routien visit\par \par #Sinus tachycardia - PE vs Hypovolemia \par - EKG - Tachy Sinus at 130\par - no chest pain or sob\par - will start pt on low dose beta blocker\par \par #HTN - Pt is actually low BP last two visits\par - Today Manual: 110/90\par - Pt is refusing stopping her enalapril and Norvasc\par - Counselled pt to stop norvasc and enalapril\par - will start toprol\par \par #Uterine ca/Breast ca\par - c/w carboplatin and paxel\par - f/u Onc\par \par #Microcytic anemia - ACD possibly d/t bone mets\par - Iron studies consistent w/ ACD\par - pt refusing screening EGD or colonoscopy\par \par #DLD\par - c/w statin\par \par #T2DM - A1c 5.4\par - D/C MEtfromin pt was advised\par \par #HCM\par - refuses vaccination\par - f/u in 3 months and prn\par - will order routine blood work on next visit

## 2019-07-03 DIAGNOSIS — R00.0 TACHYCARDIA, UNSPECIFIED: ICD-10-CM

## 2019-11-08 PROBLEM — I10 BENIGN ESSENTIAL HYPERTENSION: Status: ACTIVE | Noted: 2017-01-11

## 2019-11-08 NOTE — END OF VISIT
[] : Resident [FreeTextEntry3] : COVERING  FOR  DR OSEI .  CASE  WAS  DISCUSSED  WITH  THE  RESIDENT,  AGREE  WITH  A & P.\par RTC  IN  3 MONTHS

## 2019-11-08 NOTE — PHYSICAL EXAM
[No Acute Distress] : no acute distress [Well Developed] : well developed [Well Nourished] : well nourished [Well-Appearing] : well-appearing [Normal Sclera/Conjunctiva] : normal sclera/conjunctiva [PERRL] : pupils equal round and reactive to light [EOMI] : extraocular movements intact [Normal Oropharynx] : the oropharynx was normal [Normal Outer Ear/Nose] : the outer ears and nose were normal in appearance [No Lymphadenopathy] : no lymphadenopathy [No JVD] : no jugular venous distention [Thyroid Normal, No Nodules] : the thyroid was normal and there were no nodules present [Supple] : supple [No Respiratory Distress] : no respiratory distress  [Clear to Auscultation] : lungs were clear to auscultation bilaterally [No Accessory Muscle Use] : no accessory muscle use [Normal S1, S2] : normal S1 and S2 [Regular Rhythm] : with a regular rhythm [Normal Rate] : normal rate  [No Carotid Bruits] : no carotid bruits [No Abdominal Bruit] : a ~M bruit was not heard ~T in the abdomen [Pedal Pulses Present] : the pedal pulses are present [No Varicosities] : no varicosities [No Palpable Aorta] : no palpable aorta [No Edema] : there was no peripheral edema [Soft] : abdomen soft [No Extremity Clubbing/Cyanosis] : no extremity clubbing/cyanosis [Non Tender] : non-tender [Non-distended] : non-distended [No HSM] : no HSM [No Masses] : no abdominal mass palpated [Normal Posterior Cervical Nodes] : no posterior cervical lymphadenopathy [Normal Bowel Sounds] : normal bowel sounds [No CVA Tenderness] : no CVA  tenderness [Normal Anterior Cervical Nodes] : no anterior cervical lymphadenopathy [Grossly Normal Strength/Tone] : grossly normal strength/tone [No Joint Swelling] : no joint swelling [No Spinal Tenderness] : no spinal tenderness [Coordination Grossly Intact] : coordination grossly intact [No Rash] : no rash [Normal Gait] : normal gait [No Focal Deficits] : no focal deficits [Deep Tendon Reflexes (DTR)] : deep tendon reflexes were 2+ and symmetric [Normal Affect] : the affect was normal [Normal Insight/Judgement] : insight and judgment were intact [de-identified] : systolic 3/6 murmur

## 2019-11-08 NOTE — HISTORY OF PRESENT ILLNESS
[de-identified] : 73 y/o FM w/ PMHx of Breast ca (ER+/MT+) s/p lumpectomy, Uterine ca and cervical cancer w/ mets to the bone, HTN, COPD, DLD and osteoporosis presents for routine visit. PT was recently admitted at [facility] for sinus tachycardia and hypotension but pt did not want to be evaluated for PE (CTA or V/Q scan) and pt was discharged without anticoagulation. She then went to [facility] and was found to have a DVT but no PE so was started on Eliquis 5 BID. She denies any other symptoms at this point. Compliant with meds. Uses rescue inhalers once a month. [FreeTextEntry1] : med refill

## 2019-11-08 NOTE — ASSESSMENT
Group Therapy Note    Group Start Time: 1630  Group End Time: 1710  Group Topic: Healthy Living/Wellness     Attendees: 7     Notes:  Patient did not attend group.     Discipline Responsible: Licensed Practical Nurse    Signature:  Yancy Hopson LPN [FreeTextEntry1] : 72 y/o FM w/ PMHx of Breast ca (ER+/OR+) s/p lumpectomy, Uterine ca and cervical cancer w/ mets to the bone, HTN, COPD, DLD and osteoporosis presents for routien visit\par \par #Sinus tachycardia \par - no PE per patient , worked up in [facility]\par - stable\par - c/w toprol\par - no chest pain or sob\par \par #HTN \par -  controlled\par - c/w PO BP meds\par \par #Uterine ca/Breast ca\par - c/w carboplatin and paxel\par - f/u Onc\par \par #DLD\par - c/w statin\par \par #T2DM - A1c 5.4\par - repeat A1c\par \par # Rt DVT:\par - urged patient to get medical records\par \par #HCM\par - f/u in 6 months and prn\par - will order routine blood work for next visit. \par \par

## 2020-01-01 ENCOUNTER — OUTPATIENT (OUTPATIENT)
Dept: OUTPATIENT SERVICES | Facility: HOSPITAL | Age: 75
LOS: 1 days | Discharge: HOME | End: 2020-01-01

## 2020-01-01 ENCOUNTER — INPATIENT (INPATIENT)
Facility: HOSPITAL | Age: 75
LOS: 14 days | End: 2020-07-26
Attending: INTERNAL MEDICINE | Admitting: INTERNAL MEDICINE
Payer: MEDICARE

## 2020-01-01 ENCOUNTER — APPOINTMENT (OUTPATIENT)
Dept: INTERNAL MEDICINE | Facility: CLINIC | Age: 75
End: 2020-01-01
Payer: MEDICARE

## 2020-01-01 ENCOUNTER — APPOINTMENT (OUTPATIENT)
Dept: INTERNAL MEDICINE | Facility: CLINIC | Age: 75
End: 2020-01-01

## 2020-01-01 VITALS — HEART RATE: 104 BPM | SYSTOLIC BLOOD PRESSURE: 150 MMHG | DIASTOLIC BLOOD PRESSURE: 69 MMHG

## 2020-01-01 VITALS
RESPIRATION RATE: 18 BRPM | TEMPERATURE: 99 F | OXYGEN SATURATION: 100 % | SYSTOLIC BLOOD PRESSURE: 110 MMHG | HEART RATE: 110 BPM | DIASTOLIC BLOOD PRESSURE: 51 MMHG

## 2020-01-01 VITALS — WEIGHT: 181 LBS | BODY MASS INDEX: 28.41 KG/M2 | HEIGHT: 67 IN

## 2020-01-01 VITALS
TEMPERATURE: 97 F | OXYGEN SATURATION: 95 % | SYSTOLIC BLOOD PRESSURE: 98 MMHG | HEART RATE: 127 BPM | DIASTOLIC BLOOD PRESSURE: 62 MMHG | RESPIRATION RATE: 18 BRPM

## 2020-01-01 DIAGNOSIS — I96 GANGRENE, NOT ELSEWHERE CLASSIFIED: ICD-10-CM

## 2020-01-01 DIAGNOSIS — N18.3 CHRONIC KIDNEY DISEASE, STAGE 3 (MODERATE): ICD-10-CM

## 2020-01-01 DIAGNOSIS — N17.9 ACUTE KIDNEY FAILURE, UNSPECIFIED: ICD-10-CM

## 2020-01-01 DIAGNOSIS — Z98.890 OTHER SPECIFIED POSTPROCEDURAL STATES: Chronic | ICD-10-CM

## 2020-01-01 DIAGNOSIS — C53.9 MALIGNANT NEOPLASM OF CERVIX UTERI, UNSPECIFIED: ICD-10-CM

## 2020-01-01 DIAGNOSIS — D50.8 OTHER IRON DEFICIENCY ANEMIAS: ICD-10-CM

## 2020-01-01 DIAGNOSIS — Z91.041 RADIOGRAPHIC DYE ALLERGY STATUS: ICD-10-CM

## 2020-01-01 DIAGNOSIS — C55 MALIGNANT NEOPLASM OF UTERUS, PART UNSPECIFIED: ICD-10-CM

## 2020-01-01 DIAGNOSIS — C79.51 SECONDARY MALIGNANT NEOPLASM OF BONE: ICD-10-CM

## 2020-01-01 DIAGNOSIS — Z00.00 ENCOUNTER FOR GENERAL ADULT MEDICAL EXAMINATION W/OUT ABNORMAL FINDINGS: ICD-10-CM

## 2020-01-01 DIAGNOSIS — Z00.00 ENCOUNTER FOR GENERAL ADULT MEDICAL EXAMINATION WITHOUT ABNORMAL FINDINGS: ICD-10-CM

## 2020-01-01 DIAGNOSIS — Z88.0 ALLERGY STATUS TO PENICILLIN: ICD-10-CM

## 2020-01-01 DIAGNOSIS — Z79.84 LONG TERM (CURRENT) USE OF ORAL HYPOGLYCEMIC DRUGS: ICD-10-CM

## 2020-01-01 DIAGNOSIS — R31.9 HEMATURIA, UNSPECIFIED: ICD-10-CM

## 2020-01-01 DIAGNOSIS — N32.1 VESICOINTESTINAL FISTULA: ICD-10-CM

## 2020-01-01 DIAGNOSIS — Z92.21 PERSONAL HISTORY OF ANTINEOPLASTIC CHEMOTHERAPY: ICD-10-CM

## 2020-01-01 DIAGNOSIS — J44.9 CHRONIC OBSTRUCTIVE PULMONARY DISEASE, UNSPECIFIED: ICD-10-CM

## 2020-01-01 DIAGNOSIS — Z92.3 PERSONAL HISTORY OF IRRADIATION: ICD-10-CM

## 2020-01-01 DIAGNOSIS — N39.0 URINARY TRACT INFECTION, SITE NOT SPECIFIED: ICD-10-CM

## 2020-01-01 DIAGNOSIS — E78.5 HYPERLIPIDEMIA, UNSPECIFIED: ICD-10-CM

## 2020-01-01 DIAGNOSIS — I12.9 HYPERTENSIVE CHRONIC KIDNEY DISEASE WITH STAGE 1 THROUGH STAGE 4 CHRONIC KIDNEY DISEASE, OR UNSPECIFIED CHRONIC KIDNEY DISEASE: ICD-10-CM

## 2020-01-01 DIAGNOSIS — Z66 DO NOT RESUSCITATE: ICD-10-CM

## 2020-01-01 DIAGNOSIS — R00.0 TACHYCARDIA, UNSPECIFIED: ICD-10-CM

## 2020-01-01 DIAGNOSIS — R10.31 RIGHT LOWER QUADRANT PAIN: ICD-10-CM

## 2020-01-01 DIAGNOSIS — Z51.5 ENCOUNTER FOR PALLIATIVE CARE: ICD-10-CM

## 2020-01-01 DIAGNOSIS — M81.0 AGE-RELATED OSTEOPOROSIS WITHOUT CURRENT PATHOLOGICAL FRACTURE: ICD-10-CM

## 2020-01-01 DIAGNOSIS — E11.22 TYPE 2 DIABETES MELLITUS WITH DIABETIC CHRONIC KIDNEY DISEASE: ICD-10-CM

## 2020-01-01 DIAGNOSIS — Z79.82 LONG TERM (CURRENT) USE OF ASPIRIN: ICD-10-CM

## 2020-01-01 DIAGNOSIS — N13.1 HYDRONEPHROSIS WITH URETERAL STRICTURE, NOT ELSEWHERE CLASSIFIED: ICD-10-CM

## 2020-01-01 DIAGNOSIS — R19.09 OTHER INTRA-ABDOMINAL AND PELVIC SWELLING, MASS AND LUMP: ICD-10-CM

## 2020-01-01 DIAGNOSIS — G93.41 METABOLIC ENCEPHALOPATHY: ICD-10-CM

## 2020-01-01 DIAGNOSIS — D50.9 IRON DEFICIENCY ANEMIA, UNSPECIFIED: ICD-10-CM

## 2020-01-01 DIAGNOSIS — N12 TUBULO-INTERSTITIAL NEPHRITIS, NOT SPECIFIED AS ACUTE OR CHRONIC: ICD-10-CM

## 2020-01-01 DIAGNOSIS — A41.9 SEPSIS, UNSPECIFIED ORGANISM: ICD-10-CM

## 2020-01-01 DIAGNOSIS — C79.89 SECONDARY MALIGNANT NEOPLASM OF OTHER SPECIFIED SITES: ICD-10-CM

## 2020-01-01 DIAGNOSIS — R18.0 MALIGNANT ASCITES: ICD-10-CM

## 2020-01-01 LAB
-  AMIKACIN: SIGNIFICANT CHANGE UP
-  AMOXICILLIN/CLAVULANIC ACID: SIGNIFICANT CHANGE UP
-  AMPICILLIN/SULBACTAM: SIGNIFICANT CHANGE UP
-  AMPICILLIN: SIGNIFICANT CHANGE UP
-  AZTREONAM: SIGNIFICANT CHANGE UP
-  CEFAZOLIN: SIGNIFICANT CHANGE UP
-  CEFEPIME: SIGNIFICANT CHANGE UP
-  CEFOXITIN: SIGNIFICANT CHANGE UP
-  CEFTRIAXONE: SIGNIFICANT CHANGE UP
-  CIPROFLOXACIN: SIGNIFICANT CHANGE UP
-  DAPTOMYCIN: SIGNIFICANT CHANGE UP
-  ERTAPENEM: SIGNIFICANT CHANGE UP
-  GENTAMICIN: SIGNIFICANT CHANGE UP
-  IMIPENEM: SIGNIFICANT CHANGE UP
-  LEVOFLOXACIN: SIGNIFICANT CHANGE UP
-  LINEZOLID: SIGNIFICANT CHANGE UP
-  MEROPENEM: SIGNIFICANT CHANGE UP
-  NITROFURANTOIN: SIGNIFICANT CHANGE UP
-  PIPERACILLIN/TAZOBACTAM: SIGNIFICANT CHANGE UP
-  TETRACYCLINE: SIGNIFICANT CHANGE UP
-  TIGECYCLINE: SIGNIFICANT CHANGE UP
-  TOBRAMYCIN: SIGNIFICANT CHANGE UP
-  TRIMETHOPRIM/SULFAMETHOXAZOLE: SIGNIFICANT CHANGE UP
-  VANCOMYCIN: SIGNIFICANT CHANGE UP
A1C WITH ESTIMATED AVERAGE GLUCOSE RESULT: 5.4 % — SIGNIFICANT CHANGE UP (ref 4–5.6)
ALBUMIN SERPL ELPH-MCNC: 2 G/DL — LOW (ref 3.5–5.2)
ALBUMIN SERPL ELPH-MCNC: 2.1 G/DL — LOW (ref 3.5–5.2)
ALBUMIN SERPL ELPH-MCNC: 2.2 G/DL — LOW (ref 3.5–5.2)
ALBUMIN SERPL ELPH-MCNC: 2.2 G/DL — LOW (ref 3.5–5.2)
ALBUMIN SERPL ELPH-MCNC: 2.4 G/DL — LOW (ref 3.5–5.2)
ALP SERPL-CCNC: 110 U/L — SIGNIFICANT CHANGE UP (ref 30–115)
ALP SERPL-CCNC: 110 U/L — SIGNIFICANT CHANGE UP (ref 30–115)
ALP SERPL-CCNC: 122 U/L — HIGH (ref 30–115)
ALP SERPL-CCNC: 125 U/L — HIGH (ref 30–115)
ALP SERPL-CCNC: 94 U/L — SIGNIFICANT CHANGE UP (ref 30–115)
ALT FLD-CCNC: 10 U/L — SIGNIFICANT CHANGE UP (ref 0–41)
ALT FLD-CCNC: 9 U/L — SIGNIFICANT CHANGE UP (ref 0–41)
ALT FLD-CCNC: <5 U/L — SIGNIFICANT CHANGE UP (ref 0–41)
ANION GAP SERPL CALC-SCNC: 11 MMOL/L — SIGNIFICANT CHANGE UP (ref 7–14)
ANION GAP SERPL CALC-SCNC: 12 MMOL/L — SIGNIFICANT CHANGE UP (ref 7–14)
ANION GAP SERPL CALC-SCNC: 13 MMOL/L — SIGNIFICANT CHANGE UP (ref 7–14)
ANION GAP SERPL CALC-SCNC: 14 MMOL/L — SIGNIFICANT CHANGE UP (ref 7–14)
ANION GAP SERPL CALC-SCNC: 14 MMOL/L — SIGNIFICANT CHANGE UP (ref 7–14)
ANION GAP SERPL CALC-SCNC: 16 MMOL/L — HIGH (ref 7–14)
ANION GAP SERPL CALC-SCNC: 19 MMOL/L — HIGH (ref 7–14)
APPEARANCE UR: ABNORMAL
APTT BLD: 33.2 SEC — SIGNIFICANT CHANGE UP (ref 27–39.2)
AST SERPL-CCNC: 12 U/L — SIGNIFICANT CHANGE UP (ref 0–41)
AST SERPL-CCNC: 13 U/L — SIGNIFICANT CHANGE UP (ref 0–41)
AST SERPL-CCNC: 15 U/L — SIGNIFICANT CHANGE UP (ref 0–41)
AST SERPL-CCNC: 16 U/L — SIGNIFICANT CHANGE UP (ref 0–41)
AST SERPL-CCNC: 7 U/L — SIGNIFICANT CHANGE UP (ref 0–41)
BACTERIA # UR AUTO: NEGATIVE — SIGNIFICANT CHANGE UP
BASOPHILS # BLD AUTO: 0.04 K/UL — SIGNIFICANT CHANGE UP (ref 0–0.2)
BASOPHILS # BLD AUTO: 0.04 K/UL — SIGNIFICANT CHANGE UP (ref 0–0.2)
BASOPHILS # BLD AUTO: 0.05 K/UL — SIGNIFICANT CHANGE UP (ref 0–0.2)
BASOPHILS # BLD AUTO: 0.06 K/UL — SIGNIFICANT CHANGE UP (ref 0–0.2)
BASOPHILS # BLD AUTO: 0.07 K/UL — SIGNIFICANT CHANGE UP (ref 0–0.2)
BASOPHILS # BLD AUTO: 0.08 K/UL — SIGNIFICANT CHANGE UP (ref 0–0.2)
BASOPHILS # BLD AUTO: 0.1 K/UL — SIGNIFICANT CHANGE UP (ref 0–0.2)
BASOPHILS NFR BLD AUTO: 0.4 % — SIGNIFICANT CHANGE UP (ref 0–1)
BASOPHILS NFR BLD AUTO: 0.6 % — SIGNIFICANT CHANGE UP (ref 0–1)
BASOPHILS NFR BLD AUTO: 0.7 % — SIGNIFICANT CHANGE UP (ref 0–1)
BASOPHILS NFR BLD AUTO: 0.7 % — SIGNIFICANT CHANGE UP (ref 0–1)
BASOPHILS NFR BLD AUTO: 0.9 % — SIGNIFICANT CHANGE UP (ref 0–1)
BILIRUB SERPL-MCNC: 0.3 MG/DL — SIGNIFICANT CHANGE UP (ref 0.2–1.2)
BILIRUB SERPL-MCNC: 0.4 MG/DL — SIGNIFICANT CHANGE UP (ref 0.2–1.2)
BILIRUB SERPL-MCNC: 0.4 MG/DL — SIGNIFICANT CHANGE UP (ref 0.2–1.2)
BILIRUB UR-MCNC: NEGATIVE — SIGNIFICANT CHANGE UP
BLD GP AB SCN SERPL QL: SIGNIFICANT CHANGE UP
BUN SERPL-MCNC: 13 MG/DL — SIGNIFICANT CHANGE UP (ref 10–20)
BUN SERPL-MCNC: 14 MG/DL — SIGNIFICANT CHANGE UP (ref 10–20)
BUN SERPL-MCNC: 15 MG/DL — SIGNIFICANT CHANGE UP (ref 10–20)
BUN SERPL-MCNC: 16 MG/DL — SIGNIFICANT CHANGE UP (ref 10–20)
BUN SERPL-MCNC: 23 MG/DL — HIGH (ref 10–20)
C DIFF BY PCR RESULT: NEGATIVE — SIGNIFICANT CHANGE UP
C DIFF TOX GENS STL QL NAA+PROBE: SIGNIFICANT CHANGE UP
CALCIUM SERPL-MCNC: 7.4 MG/DL — LOW (ref 8.5–10.1)
CALCIUM SERPL-MCNC: 7.5 MG/DL — LOW (ref 8.5–10.1)
CALCIUM SERPL-MCNC: 7.6 MG/DL — LOW (ref 8.5–10.1)
CALCIUM SERPL-MCNC: 7.7 MG/DL — LOW (ref 8.5–10.1)
CALCIUM SERPL-MCNC: 7.8 MG/DL — LOW (ref 8.5–10.1)
CALCIUM SERPL-MCNC: 7.9 MG/DL — LOW (ref 8.5–10.1)
CALCIUM SERPL-MCNC: 8 MG/DL — LOW (ref 8.5–10.1)
CALPROTECTIN STL-MCNT: 33 UG/G — SIGNIFICANT CHANGE UP (ref 0–120)
CHLORIDE SERPL-SCNC: 100 MMOL/L — SIGNIFICANT CHANGE UP (ref 98–110)
CHLORIDE SERPL-SCNC: 104 MMOL/L — SIGNIFICANT CHANGE UP (ref 98–110)
CHLORIDE SERPL-SCNC: 105 MMOL/L — SIGNIFICANT CHANGE UP (ref 98–110)
CHOLEST SERPL-MCNC: 89 MG/DL
CHOLEST/HDLC SERPL: 2.3 RATIO
CO2 SERPL-SCNC: 15 MMOL/L — LOW (ref 17–32)
CO2 SERPL-SCNC: 18 MMOL/L — SIGNIFICANT CHANGE UP (ref 17–32)
CO2 SERPL-SCNC: 18 MMOL/L — SIGNIFICANT CHANGE UP (ref 17–32)
CO2 SERPL-SCNC: 19 MMOL/L — SIGNIFICANT CHANGE UP (ref 17–32)
CO2 SERPL-SCNC: 20 MMOL/L — SIGNIFICANT CHANGE UP (ref 17–32)
COLOR SPEC: YELLOW — SIGNIFICANT CHANGE UP
CREAT SERPL-MCNC: 1 MG/DL — SIGNIFICANT CHANGE UP (ref 0.7–1.5)
CREAT SERPL-MCNC: 1.1 MG/DL — SIGNIFICANT CHANGE UP (ref 0.7–1.5)
CREAT SERPL-MCNC: 1.1 MG/DL — SIGNIFICANT CHANGE UP (ref 0.7–1.5)
CREAT SERPL-MCNC: 1.2 MG/DL — SIGNIFICANT CHANGE UP (ref 0.7–1.5)
CREAT SERPL-MCNC: 2.3 MG/DL — HIGH (ref 0.7–1.5)
CULTURE RESULTS: SIGNIFICANT CHANGE UP
DIFF PNL FLD: ABNORMAL
EOSINOPHIL # BLD AUTO: 0.01 K/UL — SIGNIFICANT CHANGE UP (ref 0–0.7)
EOSINOPHIL # BLD AUTO: 0.04 K/UL — SIGNIFICANT CHANGE UP (ref 0–0.7)
EOSINOPHIL # BLD AUTO: 0.04 K/UL — SIGNIFICANT CHANGE UP (ref 0–0.7)
EOSINOPHIL # BLD AUTO: 0.05 K/UL — SIGNIFICANT CHANGE UP (ref 0–0.7)
EOSINOPHIL # BLD AUTO: 0.07 K/UL — SIGNIFICANT CHANGE UP (ref 0–0.7)
EOSINOPHIL NFR BLD AUTO: 0.1 % — SIGNIFICANT CHANGE UP (ref 0–8)
EOSINOPHIL NFR BLD AUTO: 0.4 % — SIGNIFICANT CHANGE UP (ref 0–8)
EOSINOPHIL NFR BLD AUTO: 0.6 % — SIGNIFICANT CHANGE UP (ref 0–8)
EOSINOPHIL NFR BLD AUTO: 0.6 % — SIGNIFICANT CHANGE UP (ref 0–8)
EOSINOPHIL NFR BLD AUTO: 0.7 % — SIGNIFICANT CHANGE UP (ref 0–8)
EPI CELLS # UR: 5 /HPF — SIGNIFICANT CHANGE UP (ref 0–5)
ESTIMATED AVERAGE GLUCOSE: 108 MG/DL
ESTIMATED AVERAGE GLUCOSE: 108 MG/DL — SIGNIFICANT CHANGE UP (ref 68–114)
FERRITIN SERPL-MCNC: 844 NG/ML — HIGH (ref 15–150)
GLUCOSE SERPL-MCNC: 100 MG/DL — HIGH (ref 70–99)
GLUCOSE SERPL-MCNC: 100 MG/DL — HIGH (ref 70–99)
GLUCOSE SERPL-MCNC: 104 MG/DL — HIGH (ref 70–99)
GLUCOSE SERPL-MCNC: 68 MG/DL — LOW (ref 70–99)
GLUCOSE SERPL-MCNC: 72 MG/DL — SIGNIFICANT CHANGE UP (ref 70–99)
GLUCOSE SERPL-MCNC: 76 MG/DL — SIGNIFICANT CHANGE UP (ref 70–99)
GLUCOSE SERPL-MCNC: 92 MG/DL — SIGNIFICANT CHANGE UP (ref 70–99)
GLUCOSE UR QL: NEGATIVE — SIGNIFICANT CHANGE UP
HBA1C MFR BLD HPLC: 5.4 %
HCT VFR BLD CALC: 22.3 % — LOW (ref 37–47)
HCT VFR BLD CALC: 23.7 % — LOW (ref 37–47)
HCT VFR BLD CALC: 24.4 % — LOW (ref 37–47)
HCT VFR BLD CALC: 24.8 % — LOW (ref 37–47)
HCT VFR BLD CALC: 26.9 % — LOW (ref 37–47)
HCT VFR BLD CALC: 26.9 % — LOW (ref 37–47)
HCT VFR BLD CALC: 27.8 % — LOW (ref 37–47)
HCV AB S/CO SERPL IA: 0.04 COI — SIGNIFICANT CHANGE UP
HCV AB SERPL-IMP: SIGNIFICANT CHANGE UP
HDLC SERPL-MCNC: 38 MG/DL
HGB BLD-MCNC: 7.1 G/DL — LOW (ref 12–16)
HGB BLD-MCNC: 7.3 G/DL — LOW (ref 12–16)
HGB BLD-MCNC: 7.5 G/DL — LOW (ref 12–16)
HGB BLD-MCNC: 7.8 G/DL — LOW (ref 12–16)
HGB BLD-MCNC: 8.1 G/DL — LOW (ref 12–16)
HGB BLD-MCNC: 8.4 G/DL — LOW (ref 12–16)
HGB BLD-MCNC: 8.4 G/DL — LOW (ref 12–16)
HYALINE CASTS # UR AUTO: 6 /LPF — SIGNIFICANT CHANGE UP (ref 0–7)
IMM GRANULOCYTES NFR BLD AUTO: 0.5 % — HIGH (ref 0.1–0.3)
IMM GRANULOCYTES NFR BLD AUTO: 0.6 % — HIGH (ref 0.1–0.3)
IMM GRANULOCYTES NFR BLD AUTO: 0.6 % — HIGH (ref 0.1–0.3)
IMM GRANULOCYTES NFR BLD AUTO: 1.2 % — HIGH (ref 0.1–0.3)
INR BLD: 1.5 RATIO — HIGH (ref 0.65–1.3)
IRON SATN MFR SERPL: 25 % — SIGNIFICANT CHANGE UP (ref 15–50)
IRON SATN MFR SERPL: 25 UG/DL — LOW (ref 35–150)
KETONES UR-MCNC: NEGATIVE — SIGNIFICANT CHANGE UP
LACTATE SERPL-SCNC: 1.6 MMOL/L — SIGNIFICANT CHANGE UP (ref 0.7–2)
LDLC SERPL CALC-MCNC: 27 MG/DL
LEUKOCYTE ESTERASE UR-ACNC: ABNORMAL
LIDOCAIN IGE QN: 62 U/L — HIGH (ref 7–60)
LYMPHOCYTES # BLD AUTO: 0.57 K/UL — LOW (ref 1.2–3.4)
LYMPHOCYTES # BLD AUTO: 0.6 K/UL — LOW (ref 1.2–3.4)
LYMPHOCYTES # BLD AUTO: 0.61 K/UL — LOW (ref 1.2–3.4)
LYMPHOCYTES # BLD AUTO: 0.64 K/UL — LOW (ref 1.2–3.4)
LYMPHOCYTES # BLD AUTO: 0.67 K/UL — LOW (ref 1.2–3.4)
LYMPHOCYTES # BLD AUTO: 0.68 K/UL — LOW (ref 1.2–3.4)
LYMPHOCYTES # BLD AUTO: 0.72 K/UL — LOW (ref 1.2–3.4)
LYMPHOCYTES # BLD AUTO: 4.1 % — LOW (ref 20.5–51.1)
LYMPHOCYTES # BLD AUTO: 5.5 % — LOW (ref 20.5–51.1)
LYMPHOCYTES # BLD AUTO: 5.7 % — LOW (ref 20.5–51.1)
LYMPHOCYTES # BLD AUTO: 5.7 % — LOW (ref 20.5–51.1)
LYMPHOCYTES # BLD AUTO: 6.3 % — LOW (ref 20.5–51.1)
LYMPHOCYTES # BLD AUTO: 6.3 % — LOW (ref 20.5–51.1)
LYMPHOCYTES # BLD AUTO: 6.6 % — LOW (ref 20.5–51.1)
MAGNESIUM SERPL-MCNC: 1.2 MG/DL — LOW (ref 1.8–2.4)
MAGNESIUM SERPL-MCNC: 1.2 MG/DL — LOW (ref 1.8–2.4)
MAGNESIUM SERPL-MCNC: 1.9 MG/DL — SIGNIFICANT CHANGE UP (ref 1.8–2.4)
MAGNESIUM SERPL-MCNC: 1.9 MG/DL — SIGNIFICANT CHANGE UP (ref 1.8–2.4)
MAGNESIUM SERPL-MCNC: 2.1 MG/DL — SIGNIFICANT CHANGE UP (ref 1.8–2.4)
MCHC RBC-ENTMCNC: 24.3 PG — LOW (ref 27–31)
MCHC RBC-ENTMCNC: 24.4 PG — LOW (ref 27–31)
MCHC RBC-ENTMCNC: 24.7 PG — LOW (ref 27–31)
MCHC RBC-ENTMCNC: 24.9 PG — LOW (ref 27–31)
MCHC RBC-ENTMCNC: 25 PG — LOW (ref 27–31)
MCHC RBC-ENTMCNC: 25.1 PG — LOW (ref 27–31)
MCHC RBC-ENTMCNC: 25.5 PG — LOW (ref 27–31)
MCHC RBC-ENTMCNC: 30.1 G/DL — LOW (ref 32–37)
MCHC RBC-ENTMCNC: 30.2 G/DL — LOW (ref 32–37)
MCHC RBC-ENTMCNC: 30.7 G/DL — LOW (ref 32–37)
MCHC RBC-ENTMCNC: 30.8 G/DL — LOW (ref 32–37)
MCHC RBC-ENTMCNC: 31.2 G/DL — LOW (ref 32–37)
MCHC RBC-ENTMCNC: 31.5 G/DL — LOW (ref 32–37)
MCHC RBC-ENTMCNC: 31.8 G/DL — LOW (ref 32–37)
MCV RBC AUTO: 78.8 FL — LOW (ref 81–99)
MCV RBC AUTO: 79.8 FL — LOW (ref 81–99)
MCV RBC AUTO: 80.1 FL — LOW (ref 81–99)
MCV RBC AUTO: 80.6 FL — LOW (ref 81–99)
MCV RBC AUTO: 81 FL — SIGNIFICANT CHANGE UP (ref 81–99)
MCV RBC AUTO: 81 FL — SIGNIFICANT CHANGE UP (ref 81–99)
MCV RBC AUTO: 81.3 FL — SIGNIFICANT CHANGE UP (ref 81–99)
METHOD TYPE: SIGNIFICANT CHANGE UP
MONOCYTES # BLD AUTO: 0.33 K/UL — SIGNIFICANT CHANGE UP (ref 0.1–0.6)
MONOCYTES # BLD AUTO: 0.35 K/UL — SIGNIFICANT CHANGE UP (ref 0.1–0.6)
MONOCYTES # BLD AUTO: 0.36 K/UL — SIGNIFICANT CHANGE UP (ref 0.1–0.6)
MONOCYTES # BLD AUTO: 0.39 K/UL — SIGNIFICANT CHANGE UP (ref 0.1–0.6)
MONOCYTES # BLD AUTO: 0.47 K/UL — SIGNIFICANT CHANGE UP (ref 0.1–0.6)
MONOCYTES # BLD AUTO: 0.48 K/UL — SIGNIFICANT CHANGE UP (ref 0.1–0.6)
MONOCYTES # BLD AUTO: 0.58 K/UL — SIGNIFICANT CHANGE UP (ref 0.1–0.6)
MONOCYTES NFR BLD AUTO: 3 % — SIGNIFICANT CHANGE UP (ref 1.7–9.3)
MONOCYTES NFR BLD AUTO: 3.4 % — SIGNIFICANT CHANGE UP (ref 1.7–9.3)
MONOCYTES NFR BLD AUTO: 3.6 % — SIGNIFICANT CHANGE UP (ref 1.7–9.3)
MONOCYTES NFR BLD AUTO: 3.7 % — SIGNIFICANT CHANGE UP (ref 1.7–9.3)
MONOCYTES NFR BLD AUTO: 4.1 % — SIGNIFICANT CHANGE UP (ref 1.7–9.3)
MONOCYTES NFR BLD AUTO: 4.2 % — SIGNIFICANT CHANGE UP (ref 1.7–9.3)
MONOCYTES NFR BLD AUTO: 4.2 % — SIGNIFICANT CHANGE UP (ref 1.7–9.3)
NEUTROPHILS # BLD AUTO: 10.36 K/UL — HIGH (ref 1.4–6.5)
NEUTROPHILS # BLD AUTO: 12.57 K/UL — HIGH (ref 1.4–6.5)
NEUTROPHILS # BLD AUTO: 8.31 K/UL — HIGH (ref 1.4–6.5)
NEUTROPHILS # BLD AUTO: 9.56 K/UL — HIGH (ref 1.4–6.5)
NEUTROPHILS # BLD AUTO: 9.59 K/UL — HIGH (ref 1.4–6.5)
NEUTROPHILS # BLD AUTO: 9.84 K/UL — HIGH (ref 1.4–6.5)
NEUTROPHILS # BLD AUTO: 9.93 K/UL — HIGH (ref 1.4–6.5)
NEUTROPHILS NFR BLD AUTO: 88 % — HIGH (ref 42.2–75.2)
NEUTROPHILS NFR BLD AUTO: 88.1 % — HIGH (ref 42.2–75.2)
NEUTROPHILS NFR BLD AUTO: 88.1 % — HIGH (ref 42.2–75.2)
NEUTROPHILS NFR BLD AUTO: 88.6 % — HIGH (ref 42.2–75.2)
NEUTROPHILS NFR BLD AUTO: 89 % — HIGH (ref 42.2–75.2)
NEUTROPHILS NFR BLD AUTO: 89.5 % — HIGH (ref 42.2–75.2)
NEUTROPHILS NFR BLD AUTO: 90.6 % — HIGH (ref 42.2–75.2)
NITRITE UR-MCNC: NEGATIVE — SIGNIFICANT CHANGE UP
NRBC # BLD: 0 /100 WBCS — SIGNIFICANT CHANGE UP (ref 0–0)
ORGANISM # SPEC MICROSCOPIC CNT: SIGNIFICANT CHANGE UP
PH UR: 6 — SIGNIFICANT CHANGE UP (ref 5–8)
PLATELET # BLD AUTO: 206 K/UL — SIGNIFICANT CHANGE UP (ref 130–400)
PLATELET # BLD AUTO: 213 K/UL — SIGNIFICANT CHANGE UP (ref 130–400)
PLATELET # BLD AUTO: 232 K/UL — SIGNIFICANT CHANGE UP (ref 130–400)
PLATELET # BLD AUTO: 252 K/UL — SIGNIFICANT CHANGE UP (ref 130–400)
PLATELET # BLD AUTO: 257 K/UL — SIGNIFICANT CHANGE UP (ref 130–400)
PLATELET # BLD AUTO: 293 K/UL — SIGNIFICANT CHANGE UP (ref 130–400)
PLATELET # BLD AUTO: 433 K/UL — HIGH (ref 130–400)
POTASSIUM SERPL-MCNC: 3.2 MMOL/L — LOW (ref 3.5–5)
POTASSIUM SERPL-MCNC: 3.8 MMOL/L — SIGNIFICANT CHANGE UP (ref 3.5–5)
POTASSIUM SERPL-MCNC: 4 MMOL/L — SIGNIFICANT CHANGE UP (ref 3.5–5)
POTASSIUM SERPL-MCNC: 4.1 MMOL/L — SIGNIFICANT CHANGE UP (ref 3.5–5)
POTASSIUM SERPL-MCNC: 4.2 MMOL/L — SIGNIFICANT CHANGE UP (ref 3.5–5)
POTASSIUM SERPL-MCNC: 4.2 MMOL/L — SIGNIFICANT CHANGE UP (ref 3.5–5)
POTASSIUM SERPL-MCNC: 4.3 MMOL/L — SIGNIFICANT CHANGE UP (ref 3.5–5)
POTASSIUM SERPL-SCNC: 3.2 MMOL/L — LOW (ref 3.5–5)
POTASSIUM SERPL-SCNC: 3.8 MMOL/L — SIGNIFICANT CHANGE UP (ref 3.5–5)
POTASSIUM SERPL-SCNC: 4 MMOL/L — SIGNIFICANT CHANGE UP (ref 3.5–5)
POTASSIUM SERPL-SCNC: 4.1 MMOL/L — SIGNIFICANT CHANGE UP (ref 3.5–5)
POTASSIUM SERPL-SCNC: 4.2 MMOL/L — SIGNIFICANT CHANGE UP (ref 3.5–5)
POTASSIUM SERPL-SCNC: 4.2 MMOL/L — SIGNIFICANT CHANGE UP (ref 3.5–5)
POTASSIUM SERPL-SCNC: 4.3 MMOL/L — SIGNIFICANT CHANGE UP (ref 3.5–5)
PROT SERPL-MCNC: 5.3 G/DL — LOW (ref 6–8)
PROT SERPL-MCNC: 5.4 G/DL — LOW (ref 6–8)
PROT SERPL-MCNC: 5.5 G/DL — LOW (ref 6–8)
PROT SERPL-MCNC: 5.7 G/DL — LOW (ref 6–8)
PROT SERPL-MCNC: 5.9 G/DL — LOW (ref 6–8)
PROT UR-MCNC: ABNORMAL
PROTHROM AB SERPL-ACNC: 17.2 SEC — HIGH (ref 9.95–12.87)
RBC # BLD: 2.83 M/UL — LOW (ref 4.2–5.4)
RBC # BLD: 2.96 M/UL — LOW (ref 4.2–5.4)
RBC # BLD: 3 M/UL — LOW (ref 4.2–5.4)
RBC # BLD: 3.06 M/UL — LOW (ref 4.2–5.4)
RBC # BLD: 3.32 M/UL — LOW (ref 4.2–5.4)
RBC # BLD: 3.37 M/UL — LOW (ref 4.2–5.4)
RBC # BLD: 3.45 M/UL — LOW (ref 4.2–5.4)
RBC # FLD: 19.9 % — HIGH (ref 11.5–14.5)
RBC # FLD: 20.5 % — HIGH (ref 11.5–14.5)
RBC # FLD: 20.5 % — HIGH (ref 11.5–14.5)
RBC # FLD: 20.6 % — HIGH (ref 11.5–14.5)
RBC # FLD: 20.6 % — HIGH (ref 11.5–14.5)
RBC # FLD: 20.7 % — HIGH (ref 11.5–14.5)
RBC # FLD: 20.9 % — HIGH (ref 11.5–14.5)
RBC CASTS # UR COMP ASSIST: 9 /HPF — HIGH (ref 0–4)
SARS-COV-2 IGG SERPL QL IA: NEGATIVE — SIGNIFICANT CHANGE UP
SARS-COV-2 IGM SERPL IA-ACNC: <0.1 INDEX — SIGNIFICANT CHANGE UP
SARS-COV-2 RNA SPEC QL NAA+PROBE: SIGNIFICANT CHANGE UP
SARS-COV-2 RNA SPEC QL NAA+PROBE: SIGNIFICANT CHANGE UP
SODIUM SERPL-SCNC: 134 MMOL/L — LOW (ref 135–146)
SODIUM SERPL-SCNC: 136 MMOL/L — SIGNIFICANT CHANGE UP (ref 135–146)
SODIUM SERPL-SCNC: 137 MMOL/L — SIGNIFICANT CHANGE UP (ref 135–146)
SODIUM SERPL-SCNC: 138 MMOL/L — SIGNIFICANT CHANGE UP (ref 135–146)
SODIUM SERPL-SCNC: 139 MMOL/L — SIGNIFICANT CHANGE UP (ref 135–146)
SP GR SPEC: 1.02 — SIGNIFICANT CHANGE UP (ref 1.01–1.02)
SPECIMEN SOURCE: SIGNIFICANT CHANGE UP
TIBC SERPL-MCNC: 102 UG/DL — LOW (ref 220–430)
TRIGL SERPL-MCNC: 117 MG/DL
TROPONIN T SERPL-MCNC: <0.01 NG/ML — SIGNIFICANT CHANGE UP
TSH SERPL-MCNC: 5.99 UIU/ML — HIGH (ref 0.27–4.2)
UIBC SERPL-MCNC: 77 UG/DL — LOW (ref 110–370)
UROBILINOGEN FLD QL: SIGNIFICANT CHANGE UP
WBC # BLD: 10.73 K/UL — SIGNIFICANT CHANGE UP (ref 4.8–10.8)
WBC # BLD: 10.89 K/UL — HIGH (ref 4.8–10.8)
WBC # BLD: 11.09 K/UL — HIGH (ref 4.8–10.8)
WBC # BLD: 11.16 K/UL — HIGH (ref 4.8–10.8)
WBC # BLD: 11.7 K/UL — HIGH (ref 4.8–10.8)
WBC # BLD: 13.87 K/UL — HIGH (ref 4.8–10.8)
WBC # BLD: 9.45 K/UL — SIGNIFICANT CHANGE UP (ref 4.8–10.8)
WBC # FLD AUTO: 10.73 K/UL — SIGNIFICANT CHANGE UP (ref 4.8–10.8)
WBC # FLD AUTO: 10.89 K/UL — HIGH (ref 4.8–10.8)
WBC # FLD AUTO: 11.09 K/UL — HIGH (ref 4.8–10.8)
WBC # FLD AUTO: 11.16 K/UL — HIGH (ref 4.8–10.8)
WBC # FLD AUTO: 11.7 K/UL — HIGH (ref 4.8–10.8)
WBC # FLD AUTO: 13.87 K/UL — HIGH (ref 4.8–10.8)
WBC # FLD AUTO: 9.45 K/UL — SIGNIFICANT CHANGE UP (ref 4.8–10.8)
WBC UR QL: 142 /HPF — HIGH (ref 0–5)

## 2020-01-01 PROCEDURE — 99233 SBSQ HOSP IP/OBS HIGH 50: CPT

## 2020-01-01 PROCEDURE — 99232 SBSQ HOSP IP/OBS MODERATE 35: CPT

## 2020-01-01 PROCEDURE — 99221 1ST HOSP IP/OBS SF/LOW 40: CPT

## 2020-01-01 PROCEDURE — 99231 SBSQ HOSP IP/OBS SF/LOW 25: CPT

## 2020-01-01 PROCEDURE — 74176 CT ABD & PELVIS W/O CONTRAST: CPT | Mod: 26

## 2020-01-01 PROCEDURE — 99223 1ST HOSP IP/OBS HIGH 75: CPT

## 2020-01-01 PROCEDURE — 99239 HOSP IP/OBS DSCHRG MGMT >30: CPT

## 2020-01-01 PROCEDURE — 93306 TTE W/DOPPLER COMPLETE: CPT | Mod: 26

## 2020-01-01 PROCEDURE — 99232 SBSQ HOSP IP/OBS MODERATE 35: CPT | Mod: GC

## 2020-01-01 PROCEDURE — 99285 EMERGENCY DEPT VISIT HI MDM: CPT

## 2020-01-01 PROCEDURE — 99222 1ST HOSP IP/OBS MODERATE 55: CPT

## 2020-01-01 PROCEDURE — 99497 ADVNCD CARE PLAN 30 MIN: CPT

## 2020-01-01 PROCEDURE — 99231 SBSQ HOSP IP/OBS SF/LOW 25: CPT | Mod: GC

## 2020-01-01 PROCEDURE — 99213 OFFICE O/P EST LOW 20 MIN: CPT | Mod: GC

## 2020-01-01 PROCEDURE — 76705 ECHO EXAM OF ABDOMEN: CPT | Mod: 26

## 2020-01-01 PROCEDURE — 93010 ELECTROCARDIOGRAM REPORT: CPT

## 2020-01-01 PROCEDURE — 76770 US EXAM ABDO BACK WALL COMP: CPT | Mod: 26

## 2020-01-01 RX ORDER — SIMVASTATIN 20 MG/1
20 TABLET, FILM COATED ORAL AT BEDTIME
Refills: 0 | Status: DISCONTINUED | OUTPATIENT
Start: 2020-01-01 | End: 2020-01-01

## 2020-01-01 RX ORDER — FLUTICASONE PROPIONATE AND SALMETEROL 50; 250 UG/1; UG/1
1 POWDER ORAL; RESPIRATORY (INHALATION)
Qty: 0 | Refills: 0 | DISCHARGE

## 2020-01-01 RX ORDER — MONTELUKAST 4 MG/1
1 TABLET, CHEWABLE ORAL
Qty: 0 | Refills: 0 | DISCHARGE

## 2020-01-01 RX ORDER — MAGNESIUM SULFATE 500 MG/ML
2 VIAL (ML) INJECTION
Refills: 0 | Status: COMPLETED | OUTPATIENT
Start: 2020-01-01 | End: 2020-01-01

## 2020-01-01 RX ORDER — LATANOPROST 0.05 MG/ML
1 SOLUTION/ DROPS OPHTHALMIC; TOPICAL AT BEDTIME
Refills: 0 | Status: DISCONTINUED | OUTPATIENT
Start: 2020-01-01 | End: 2020-01-01

## 2020-01-01 RX ORDER — ONDANSETRON 8 MG/1
4 TABLET, FILM COATED ORAL ONCE
Refills: 0 | Status: COMPLETED | OUTPATIENT
Start: 2020-01-01 | End: 2020-01-01

## 2020-01-01 RX ORDER — CEFEPIME 1 G/1
2000 INJECTION, POWDER, FOR SOLUTION INTRAMUSCULAR; INTRAVENOUS EVERY 12 HOURS
Refills: 0 | Status: DISCONTINUED | OUTPATIENT
Start: 2020-01-01 | End: 2020-01-01

## 2020-01-01 RX ORDER — MAGNESIUM OXIDE 400 MG ORAL TABLET 241.3 MG
400 TABLET ORAL
Refills: 0 | Status: DISCONTINUED | OUTPATIENT
Start: 2020-01-01 | End: 2020-01-01

## 2020-01-01 RX ORDER — APIXABAN 2.5 MG/1
1 TABLET, FILM COATED ORAL
Qty: 0 | Refills: 0 | DISCHARGE
Start: 2020-01-01

## 2020-01-01 RX ORDER — APIXABAN 2.5 MG/1
5 TABLET, FILM COATED ORAL EVERY 12 HOURS
Refills: 0 | Status: DISCONTINUED | OUTPATIENT
Start: 2020-01-01 | End: 2020-01-01

## 2020-01-01 RX ORDER — CEFEPIME 1 G/1
INJECTION, POWDER, FOR SOLUTION INTRAMUSCULAR; INTRAVENOUS
Refills: 0 | Status: DISCONTINUED | OUTPATIENT
Start: 2020-01-01 | End: 2020-01-01

## 2020-01-01 RX ORDER — VANCOMYCIN HCL 1 G
750 VIAL (EA) INTRAVENOUS ONCE
Refills: 0 | Status: DISCONTINUED | OUTPATIENT
Start: 2020-01-01 | End: 2020-01-01

## 2020-01-01 RX ORDER — MULTIVIT WITH MIN/MFOLATE/K2 340-15/3 G
2 POWDER (GRAM) ORAL ONCE
Refills: 0 | Status: COMPLETED | OUTPATIENT
Start: 2020-01-01 | End: 2020-01-01

## 2020-01-01 RX ORDER — MONTELUKAST 4 MG/1
1 TABLET, CHEWABLE ORAL
Qty: 0 | Refills: 0 | DISCHARGE
Start: 2020-01-01

## 2020-01-01 RX ORDER — POTASSIUM CHLORIDE 20 MEQ
20 PACKET (EA) ORAL ONCE
Refills: 0 | Status: COMPLETED | OUTPATIENT
Start: 2020-01-01 | End: 2020-01-01

## 2020-01-01 RX ORDER — ONDANSETRON 8 MG/1
4 TABLET, FILM COATED ORAL EVERY 6 HOURS
Refills: 0 | Status: DISCONTINUED | OUTPATIENT
Start: 2020-01-01 | End: 2020-01-01

## 2020-01-01 RX ORDER — TAMOXIFEN CITRATE 20 MG/1
1 TABLET, FILM COATED ORAL
Qty: 0 | Refills: 0 | DISCHARGE
Start: 2020-01-01

## 2020-01-01 RX ORDER — SODIUM CHLORIDE 9 MG/ML
1000 INJECTION, SOLUTION INTRAVENOUS
Refills: 0 | Status: DISCONTINUED | OUTPATIENT
Start: 2020-01-01 | End: 2020-01-01

## 2020-01-01 RX ORDER — TAMOXIFEN CITRATE 20 MG/1
1 TABLET, FILM COATED ORAL
Qty: 0 | Refills: 0 | DISCHARGE

## 2020-01-01 RX ORDER — METOPROLOL SUCCINATE 25 MG/1
25 TABLET, EXTENDED RELEASE ORAL
Qty: 30 | Refills: 2 | Status: ACTIVE | COMMUNITY
Start: 2019-07-01

## 2020-01-01 RX ORDER — ASPIRIN 81 MG/1
81 TABLET ORAL DAILY
Qty: 30 | Refills: 3 | Status: ACTIVE | COMMUNITY
Start: 2017-01-11 | End: 1900-01-01

## 2020-01-01 RX ORDER — LORATADINE 5 MG/5 ML
10 SOLUTION, ORAL ORAL EVERY 8 HOURS
Qty: 30 | Refills: 0 | Status: ACTIVE | COMMUNITY
Start: 2020-01-01 | End: 1900-01-01

## 2020-01-01 RX ORDER — ASPIRIN/CALCIUM CARB/MAGNESIUM 324 MG
1 TABLET ORAL
Qty: 0 | Refills: 0 | DISCHARGE

## 2020-01-01 RX ORDER — VANCOMYCIN HCL 1 G
1000 VIAL (EA) INTRAVENOUS EVERY 12 HOURS
Refills: 0 | Status: DISCONTINUED | OUTPATIENT
Start: 2020-01-01 | End: 2020-01-01

## 2020-01-01 RX ORDER — ENOXAPARIN SODIUM 100 MG/ML
70 INJECTION SUBCUTANEOUS EVERY 12 HOURS
Refills: 0 | Status: DISCONTINUED | OUTPATIENT
Start: 2020-01-01 | End: 2020-01-01

## 2020-01-01 RX ORDER — ENOXAPARIN SODIUM 100 MG/ML
70 INJECTION SUBCUTANEOUS
Refills: 0 | Status: DISCONTINUED | OUTPATIENT
Start: 2020-01-01 | End: 2020-01-01

## 2020-01-01 RX ORDER — MORPHINE SULFATE 50 MG/1
2 CAPSULE, EXTENDED RELEASE ORAL EVERY 6 HOURS
Refills: 0 | Status: DISCONTINUED | OUTPATIENT
Start: 2020-01-01 | End: 2020-01-01

## 2020-01-01 RX ORDER — ENOXAPARIN SODIUM 100 MG/ML
60 INJECTION SUBCUTANEOUS EVERY 12 HOURS
Refills: 0 | Status: DISCONTINUED | OUTPATIENT
Start: 2020-01-01 | End: 2020-01-01

## 2020-01-01 RX ORDER — SODIUM CHLORIDE 9 MG/ML
500 INJECTION, SOLUTION INTRAVENOUS ONCE
Refills: 0 | Status: COMPLETED | OUTPATIENT
Start: 2020-01-01 | End: 2020-01-01

## 2020-01-01 RX ORDER — HYDROMORPHONE HYDROCHLORIDE 2 MG/ML
0.5 INJECTION INTRAMUSCULAR; INTRAVENOUS; SUBCUTANEOUS EVERY 4 HOURS
Refills: 0 | Status: DISCONTINUED | OUTPATIENT
Start: 2020-01-01 | End: 2020-01-01

## 2020-01-01 RX ORDER — ALBUTEROL 90 UG/1
2 AEROSOL, METERED ORAL
Qty: 0 | Refills: 0 | DISCHARGE
Start: 2020-01-01

## 2020-01-01 RX ORDER — METOPROLOL TARTRATE 50 MG
1 TABLET ORAL
Qty: 0 | Refills: 0 | DISCHARGE

## 2020-01-01 RX ORDER — SODIUM CHLORIDE 9 MG/ML
250 INJECTION, SOLUTION INTRAVENOUS ONCE
Refills: 0 | Status: COMPLETED | OUTPATIENT
Start: 2020-01-01 | End: 2020-01-01

## 2020-01-01 RX ORDER — MORPHINE SULFATE 50 MG/1
2 CAPSULE, EXTENDED RELEASE ORAL EVERY 4 HOURS
Refills: 0 | Status: DISCONTINUED | OUTPATIENT
Start: 2020-01-01 | End: 2020-01-01

## 2020-01-01 RX ORDER — SODIUM CHLORIDE 9 MG/ML
500 INJECTION INTRAMUSCULAR; INTRAVENOUS; SUBCUTANEOUS ONCE
Refills: 0 | Status: COMPLETED | OUTPATIENT
Start: 2020-01-01 | End: 2020-01-01

## 2020-01-01 RX ORDER — APIXABAN 2.5 MG/1
5 TABLET, FILM COATED ORAL
Refills: 0 | Status: COMPLETED | OUTPATIENT
Start: 2020-01-01 | End: 2020-01-01

## 2020-01-01 RX ORDER — MORPHINE SULFATE 50 MG/1
2 CAPSULE, EXTENDED RELEASE ORAL
Qty: 0 | Refills: 0 | DISCHARGE
Start: 2020-01-01

## 2020-01-01 RX ORDER — ALBUTEROL 90 UG/1
2 AEROSOL, METERED ORAL EVERY 6 HOURS
Refills: 0 | Status: DISCONTINUED | OUTPATIENT
Start: 2020-01-01 | End: 2020-01-01

## 2020-01-01 RX ORDER — MEROPENEM 1 G/30ML
1000 INJECTION INTRAVENOUS EVERY 8 HOURS
Refills: 0 | Status: DISCONTINUED | OUTPATIENT
Start: 2020-01-01 | End: 2020-01-01

## 2020-01-01 RX ORDER — CEFEPIME 1 G/1
2000 INJECTION, POWDER, FOR SOLUTION INTRAMUSCULAR; INTRAVENOUS ONCE
Refills: 0 | Status: COMPLETED | OUTPATIENT
Start: 2020-01-01 | End: 2020-01-01

## 2020-01-01 RX ORDER — IOHEXOL 300 MG/ML
30 INJECTION, SOLUTION INTRAVENOUS ONCE
Refills: 0 | Status: COMPLETED | OUTPATIENT
Start: 2020-01-01 | End: 2020-01-01

## 2020-01-01 RX ORDER — SIMVASTATIN 20 MG/1
1 TABLET, FILM COATED ORAL
Qty: 0 | Refills: 0 | DISCHARGE
Start: 2020-01-01

## 2020-01-01 RX ORDER — CEFPODOXIME PROXETIL 100 MG
1 TABLET ORAL
Qty: 22 | Refills: 0
Start: 2020-01-01 | End: 2020-08-01

## 2020-01-01 RX ORDER — FERROUS SULFATE 325(65) MG
325 (65 FE) TABLET ORAL DAILY
Qty: 120 | Refills: 0 | Status: ACTIVE | COMMUNITY
Start: 2020-01-01 | End: 1900-01-01

## 2020-01-01 RX ORDER — CHLORHEXIDINE GLUCONATE 213 G/1000ML
1 SOLUTION TOPICAL
Refills: 0 | Status: DISCONTINUED | OUTPATIENT
Start: 2020-01-01 | End: 2020-01-01

## 2020-01-01 RX ORDER — VANCOMYCIN HCL 1 G
VIAL (EA) INTRAVENOUS
Refills: 0 | Status: DISCONTINUED | OUTPATIENT
Start: 2020-01-01 | End: 2020-01-01

## 2020-01-01 RX ORDER — POTASSIUM CHLORIDE 20 MEQ
20 PACKET (EA) ORAL ONCE
Refills: 0 | Status: DISCONTINUED | OUTPATIENT
Start: 2020-01-01 | End: 2020-01-01

## 2020-01-01 RX ORDER — DIPHENHYDRAMINE HCL 50 MG
25 CAPSULE ORAL EVERY 4 HOURS
Refills: 0 | Status: DISCONTINUED | OUTPATIENT
Start: 2020-01-01 | End: 2020-01-01

## 2020-01-01 RX ORDER — SIMVASTATIN 20 MG/1
1 TABLET, FILM COATED ORAL
Qty: 0 | Refills: 0 | DISCHARGE

## 2020-01-01 RX ORDER — ENOXAPARIN SODIUM 100 MG/ML
70 INJECTION SUBCUTANEOUS ONCE
Refills: 0 | Status: COMPLETED | OUTPATIENT
Start: 2020-01-01 | End: 2020-01-01

## 2020-01-01 RX ORDER — MEGESTROL ACETATE 40 MG/ML
1 SUSPENSION ORAL
Qty: 0 | Refills: 0 | DISCHARGE

## 2020-01-01 RX ORDER — SACCHAROMYCES BOULARDII 250 MG
250 POWDER IN PACKET (EA) ORAL
Refills: 0 | Status: DISCONTINUED | OUTPATIENT
Start: 2020-01-01 | End: 2020-01-01

## 2020-01-01 RX ORDER — METOPROLOL TARTRATE 50 MG
25 TABLET ORAL DAILY
Refills: 0 | Status: DISCONTINUED | OUTPATIENT
Start: 2020-01-01 | End: 2020-01-01

## 2020-01-01 RX ORDER — SOD SULF/SODIUM/NAHCO3/KCL/PEG
4000 SOLUTION, RECONSTITUTED, ORAL ORAL ONCE
Refills: 0 | Status: COMPLETED | OUTPATIENT
Start: 2020-01-01 | End: 2020-01-01

## 2020-01-01 RX ORDER — LATANOPROST 0.05 MG/ML
1 SOLUTION/ DROPS OPHTHALMIC; TOPICAL
Qty: 0 | Refills: 0 | DISCHARGE
Start: 2020-01-01

## 2020-01-01 RX ORDER — TAMOXIFEN CITRATE 20 MG/1
20 TABLET, FILM COATED ORAL DAILY
Refills: 0 | Status: DISCONTINUED | OUTPATIENT
Start: 2020-01-01 | End: 2020-01-01

## 2020-01-01 RX ORDER — AMLODIPINE BESYLATE 2.5 MG/1
1 TABLET ORAL
Qty: 0 | Refills: 0 | DISCHARGE

## 2020-01-01 RX ORDER — SODIUM CHLORIDE 9 MG/ML
1000 INJECTION INTRAMUSCULAR; INTRAVENOUS; SUBCUTANEOUS ONCE
Refills: 0 | Status: COMPLETED | OUTPATIENT
Start: 2020-01-01 | End: 2020-01-01

## 2020-01-01 RX ORDER — MEROPENEM 1 G/30ML
1000 INJECTION INTRAVENOUS EVERY 24 HOURS
Refills: 0 | Status: DISCONTINUED | OUTPATIENT
Start: 2020-01-01 | End: 2020-01-01

## 2020-01-01 RX ORDER — LOPERAMIDE HCL 2 MG
2 TABLET ORAL ONCE
Refills: 0 | Status: COMPLETED | OUTPATIENT
Start: 2020-01-01 | End: 2020-01-01

## 2020-01-01 RX ORDER — APIXABAN 2.5 MG/1
1 TABLET, FILM COATED ORAL
Qty: 0 | Refills: 0 | DISCHARGE

## 2020-01-01 RX ORDER — DIPHENHYDRAMINE HCL 50 MG
50 CAPSULE ORAL EVERY 4 HOURS
Refills: 0 | Status: DISCONTINUED | OUTPATIENT
Start: 2020-01-01 | End: 2020-01-01

## 2020-01-01 RX ORDER — ALBUTEROL 90 UG/1
2 AEROSOL, METERED ORAL
Qty: 0 | Refills: 0 | DISCHARGE

## 2020-01-01 RX ORDER — LATANOPROST 0.05 MG/ML
1 SOLUTION/ DROPS OPHTHALMIC; TOPICAL
Qty: 0 | Refills: 0 | DISCHARGE

## 2020-01-01 RX ORDER — MONTELUKAST 4 MG/1
10 TABLET, CHEWABLE ORAL DAILY
Refills: 0 | Status: DISCONTINUED | OUTPATIENT
Start: 2020-01-01 | End: 2020-01-01

## 2020-01-01 RX ORDER — METFORMIN HYDROCHLORIDE 850 MG/1
1 TABLET ORAL
Qty: 0 | Refills: 0 | DISCHARGE

## 2020-01-01 RX ORDER — SIMVASTATIN 20 MG/1
20 TABLET, FILM COATED ORAL DAILY
Qty: 90 | Refills: 3 | Status: ACTIVE | COMMUNITY
Start: 2017-04-21 | End: 1900-01-01

## 2020-01-01 RX ORDER — ALENDRONATE SODIUM 70 MG/1
70 TABLET ORAL
Qty: 0 | Refills: 0 | DISCHARGE

## 2020-01-01 RX ADMIN — Medication 250 MILLIGRAM(S): at 17:19

## 2020-01-01 RX ADMIN — CEFEPIME 100 MILLIGRAM(S): 1 INJECTION, POWDER, FOR SOLUTION INTRAMUSCULAR; INTRAVENOUS at 05:49

## 2020-01-01 RX ADMIN — APIXABAN 5 MILLIGRAM(S): 2.5 TABLET, FILM COATED ORAL at 05:46

## 2020-01-01 RX ADMIN — MONTELUKAST 10 MILLIGRAM(S): 4 TABLET, CHEWABLE ORAL at 11:55

## 2020-01-01 RX ADMIN — SIMVASTATIN 20 MILLIGRAM(S): 20 TABLET, FILM COATED ORAL at 21:53

## 2020-01-01 RX ADMIN — CHLORHEXIDINE GLUCONATE 1 APPLICATION(S): 213 SOLUTION TOPICAL at 06:03

## 2020-01-01 RX ADMIN — Medication 25 MILLIGRAM(S): at 06:03

## 2020-01-01 RX ADMIN — MONTELUKAST 10 MILLIGRAM(S): 4 TABLET, CHEWABLE ORAL at 11:10

## 2020-01-01 RX ADMIN — MAGNESIUM OXIDE 400 MG ORAL TABLET 400 MILLIGRAM(S): 241.3 TABLET ORAL at 09:33

## 2020-01-01 RX ADMIN — ENOXAPARIN SODIUM 70 MILLIGRAM(S): 100 INJECTION SUBCUTANEOUS at 17:38

## 2020-01-01 RX ADMIN — MAGNESIUM OXIDE 400 MG ORAL TABLET 400 MILLIGRAM(S): 241.3 TABLET ORAL at 08:12

## 2020-01-01 RX ADMIN — SODIUM CHLORIDE 100 MILLILITER(S): 9 INJECTION, SOLUTION INTRAVENOUS at 10:30

## 2020-01-01 RX ADMIN — MAGNESIUM OXIDE 400 MG ORAL TABLET 400 MILLIGRAM(S): 241.3 TABLET ORAL at 07:28

## 2020-01-01 RX ADMIN — Medication 250 MILLIGRAM(S): at 17:56

## 2020-01-01 RX ADMIN — CEFEPIME 100 MILLIGRAM(S): 1 INJECTION, POWDER, FOR SOLUTION INTRAMUSCULAR; INTRAVENOUS at 17:18

## 2020-01-01 RX ADMIN — SODIUM CHLORIDE 100 MILLILITER(S): 9 INJECTION, SOLUTION INTRAVENOUS at 05:47

## 2020-01-01 RX ADMIN — Medication 250 MILLIGRAM(S): at 05:50

## 2020-01-01 RX ADMIN — Medication 250 MILLIGRAM(S): at 18:30

## 2020-01-01 RX ADMIN — SODIUM CHLORIDE 100 MILLILITER(S): 9 INJECTION, SOLUTION INTRAVENOUS at 10:14

## 2020-01-01 RX ADMIN — Medication 250 MILLIGRAM(S): at 18:37

## 2020-01-01 RX ADMIN — CHLORHEXIDINE GLUCONATE 1 APPLICATION(S): 213 SOLUTION TOPICAL at 05:08

## 2020-01-01 RX ADMIN — MAGNESIUM OXIDE 400 MG ORAL TABLET 400 MILLIGRAM(S): 241.3 TABLET ORAL at 18:19

## 2020-01-01 RX ADMIN — LATANOPROST 1 DROP(S): 0.05 SOLUTION/ DROPS OPHTHALMIC; TOPICAL at 22:00

## 2020-01-01 RX ADMIN — Medication 250 MILLIGRAM(S): at 05:56

## 2020-01-01 RX ADMIN — CEFEPIME 100 MILLIGRAM(S): 1 INJECTION, POWDER, FOR SOLUTION INTRAMUSCULAR; INTRAVENOUS at 14:02

## 2020-01-01 RX ADMIN — HYDROMORPHONE HYDROCHLORIDE 0.5 MILLIGRAM(S): 2 INJECTION INTRAMUSCULAR; INTRAVENOUS; SUBCUTANEOUS at 11:31

## 2020-01-01 RX ADMIN — SODIUM CHLORIDE 100 MILLILITER(S): 9 INJECTION, SOLUTION INTRAVENOUS at 17:35

## 2020-01-01 RX ADMIN — LATANOPROST 1 DROP(S): 0.05 SOLUTION/ DROPS OPHTHALMIC; TOPICAL at 21:29

## 2020-01-01 RX ADMIN — TAMOXIFEN CITRATE 20 MILLIGRAM(S): 20 TABLET, FILM COATED ORAL at 12:00

## 2020-01-01 RX ADMIN — Medication 250 MILLIGRAM(S): at 05:57

## 2020-01-01 RX ADMIN — MONTELUKAST 10 MILLIGRAM(S): 4 TABLET, CHEWABLE ORAL at 13:59

## 2020-01-01 RX ADMIN — MONTELUKAST 10 MILLIGRAM(S): 4 TABLET, CHEWABLE ORAL at 11:34

## 2020-01-01 RX ADMIN — CHLORHEXIDINE GLUCONATE 1 APPLICATION(S): 213 SOLUTION TOPICAL at 05:57

## 2020-01-01 RX ADMIN — SODIUM CHLORIDE 100 MILLILITER(S): 9 INJECTION, SOLUTION INTRAVENOUS at 17:30

## 2020-01-01 RX ADMIN — TAMOXIFEN CITRATE 20 MILLIGRAM(S): 20 TABLET, FILM COATED ORAL at 11:56

## 2020-01-01 RX ADMIN — CHLORHEXIDINE GLUCONATE 1 APPLICATION(S): 213 SOLUTION TOPICAL at 05:47

## 2020-01-01 RX ADMIN — SIMVASTATIN 20 MILLIGRAM(S): 20 TABLET, FILM COATED ORAL at 21:13

## 2020-01-01 RX ADMIN — APIXABAN 5 MILLIGRAM(S): 2.5 TABLET, FILM COATED ORAL at 05:45

## 2020-01-01 RX ADMIN — ONDANSETRON 4 MILLIGRAM(S): 8 TABLET, FILM COATED ORAL at 17:54

## 2020-01-01 RX ADMIN — HYDROMORPHONE HYDROCHLORIDE 0.5 MILLIGRAM(S): 2 INJECTION INTRAMUSCULAR; INTRAVENOUS; SUBCUTANEOUS at 14:42

## 2020-01-01 RX ADMIN — TAMOXIFEN CITRATE 20 MILLIGRAM(S): 20 TABLET, FILM COATED ORAL at 11:11

## 2020-01-01 RX ADMIN — ENOXAPARIN SODIUM 70 MILLIGRAM(S): 100 INJECTION SUBCUTANEOUS at 17:32

## 2020-01-01 RX ADMIN — HYDROMORPHONE HYDROCHLORIDE 0.5 MILLIGRAM(S): 2 INJECTION INTRAMUSCULAR; INTRAVENOUS; SUBCUTANEOUS at 06:09

## 2020-01-01 RX ADMIN — SIMVASTATIN 20 MILLIGRAM(S): 20 TABLET, FILM COATED ORAL at 21:29

## 2020-01-01 RX ADMIN — CHLORHEXIDINE GLUCONATE 1 APPLICATION(S): 213 SOLUTION TOPICAL at 05:44

## 2020-01-01 RX ADMIN — MAGNESIUM OXIDE 400 MG ORAL TABLET 400 MILLIGRAM(S): 241.3 TABLET ORAL at 11:11

## 2020-01-01 RX ADMIN — ONDANSETRON 4 MILLIGRAM(S): 8 TABLET, FILM COATED ORAL at 21:28

## 2020-01-01 RX ADMIN — CHLORHEXIDINE GLUCONATE 1 APPLICATION(S): 213 SOLUTION TOPICAL at 05:50

## 2020-01-01 RX ADMIN — MONTELUKAST 10 MILLIGRAM(S): 4 TABLET, CHEWABLE ORAL at 14:32

## 2020-01-01 RX ADMIN — TAMOXIFEN CITRATE 20 MILLIGRAM(S): 20 TABLET, FILM COATED ORAL at 11:34

## 2020-01-01 RX ADMIN — MAGNESIUM OXIDE 400 MG ORAL TABLET 400 MILLIGRAM(S): 241.3 TABLET ORAL at 17:59

## 2020-01-01 RX ADMIN — ENOXAPARIN SODIUM 70 MILLIGRAM(S): 100 INJECTION SUBCUTANEOUS at 05:56

## 2020-01-01 RX ADMIN — ENOXAPARIN SODIUM 70 MILLIGRAM(S): 100 INJECTION SUBCUTANEOUS at 05:23

## 2020-01-01 RX ADMIN — CEFEPIME 100 MILLIGRAM(S): 1 INJECTION, POWDER, FOR SOLUTION INTRAMUSCULAR; INTRAVENOUS at 17:31

## 2020-01-01 RX ADMIN — ONDANSETRON 4 MILLIGRAM(S): 8 TABLET, FILM COATED ORAL at 14:07

## 2020-01-01 RX ADMIN — ENOXAPARIN SODIUM 70 MILLIGRAM(S): 100 INJECTION SUBCUTANEOUS at 17:19

## 2020-01-01 RX ADMIN — HYDROMORPHONE HYDROCHLORIDE 0.5 MILLIGRAM(S): 2 INJECTION INTRAMUSCULAR; INTRAVENOUS; SUBCUTANEOUS at 06:52

## 2020-01-01 RX ADMIN — Medication 250 MILLIGRAM(S): at 17:39

## 2020-01-01 RX ADMIN — ONDANSETRON 4 MILLIGRAM(S): 8 TABLET, FILM COATED ORAL at 21:40

## 2020-01-01 RX ADMIN — ENOXAPARIN SODIUM 70 MILLIGRAM(S): 100 INJECTION SUBCUTANEOUS at 17:39

## 2020-01-01 RX ADMIN — MONTELUKAST 10 MILLIGRAM(S): 4 TABLET, CHEWABLE ORAL at 11:02

## 2020-01-01 RX ADMIN — CEFEPIME 100 MILLIGRAM(S): 1 INJECTION, POWDER, FOR SOLUTION INTRAMUSCULAR; INTRAVENOUS at 11:02

## 2020-01-01 RX ADMIN — Medication 250 MILLIGRAM(S): at 17:33

## 2020-01-01 RX ADMIN — SODIUM CHLORIDE 250 MILLILITER(S): 9 INJECTION, SOLUTION INTRAVENOUS at 16:12

## 2020-01-01 RX ADMIN — SIMVASTATIN 20 MILLIGRAM(S): 20 TABLET, FILM COATED ORAL at 22:48

## 2020-01-01 RX ADMIN — MEROPENEM 100 MILLIGRAM(S): 1 INJECTION INTRAVENOUS at 22:48

## 2020-01-01 RX ADMIN — SODIUM CHLORIDE 1000 MILLILITER(S): 9 INJECTION INTRAMUSCULAR; INTRAVENOUS; SUBCUTANEOUS at 17:32

## 2020-01-01 RX ADMIN — Medication 4000 MILLILITER(S): at 15:10

## 2020-01-01 RX ADMIN — Medication 10 MILLIGRAM(S): at 05:08

## 2020-01-01 RX ADMIN — Medication 25 GRAM(S): at 03:48

## 2020-01-01 RX ADMIN — ONDANSETRON 4 MILLIGRAM(S): 8 TABLET, FILM COATED ORAL at 11:52

## 2020-01-01 RX ADMIN — APIXABAN 5 MILLIGRAM(S): 2.5 TABLET, FILM COATED ORAL at 05:08

## 2020-01-01 RX ADMIN — LATANOPROST 1 DROP(S): 0.05 SOLUTION/ DROPS OPHTHALMIC; TOPICAL at 21:25

## 2020-01-01 RX ADMIN — SODIUM CHLORIDE 100 MILLILITER(S): 9 INJECTION, SOLUTION INTRAVENOUS at 16:14

## 2020-01-01 RX ADMIN — Medication 250 MILLIGRAM(S): at 05:23

## 2020-01-01 RX ADMIN — Medication 250 MILLIGRAM(S): at 05:06

## 2020-01-01 RX ADMIN — MAGNESIUM OXIDE 400 MG ORAL TABLET 400 MILLIGRAM(S): 241.3 TABLET ORAL at 12:00

## 2020-01-01 RX ADMIN — SIMVASTATIN 20 MILLIGRAM(S): 20 TABLET, FILM COATED ORAL at 22:10

## 2020-01-01 RX ADMIN — MAGNESIUM OXIDE 400 MG ORAL TABLET 400 MILLIGRAM(S): 241.3 TABLET ORAL at 10:34

## 2020-01-01 RX ADMIN — TAMOXIFEN CITRATE 20 MILLIGRAM(S): 20 TABLET, FILM COATED ORAL at 11:10

## 2020-01-01 RX ADMIN — MONTELUKAST 10 MILLIGRAM(S): 4 TABLET, CHEWABLE ORAL at 11:50

## 2020-01-01 RX ADMIN — SODIUM CHLORIDE 250 MILLILITER(S): 9 INJECTION, SOLUTION INTRAVENOUS at 05:54

## 2020-01-01 RX ADMIN — MEROPENEM 100 MILLIGRAM(S): 1 INJECTION INTRAVENOUS at 05:06

## 2020-01-01 RX ADMIN — CEFEPIME 100 MILLIGRAM(S): 1 INJECTION, POWDER, FOR SOLUTION INTRAMUSCULAR; INTRAVENOUS at 05:58

## 2020-01-01 RX ADMIN — MAGNESIUM OXIDE 400 MG ORAL TABLET 400 MILLIGRAM(S): 241.3 TABLET ORAL at 18:30

## 2020-01-01 RX ADMIN — SODIUM CHLORIDE 250 MILLILITER(S): 9 INJECTION, SOLUTION INTRAVENOUS at 00:33

## 2020-01-01 RX ADMIN — LATANOPROST 1 DROP(S): 0.05 SOLUTION/ DROPS OPHTHALMIC; TOPICAL at 22:20

## 2020-01-01 RX ADMIN — CHLORHEXIDINE GLUCONATE 1 APPLICATION(S): 213 SOLUTION TOPICAL at 06:32

## 2020-01-01 RX ADMIN — HYDROMORPHONE HYDROCHLORIDE 0.5 MILLIGRAM(S): 2 INJECTION INTRAMUSCULAR; INTRAVENOUS; SUBCUTANEOUS at 10:32

## 2020-01-01 RX ADMIN — MEROPENEM 100 MILLIGRAM(S): 1 INJECTION INTRAVENOUS at 18:00

## 2020-01-01 RX ADMIN — SIMVASTATIN 20 MILLIGRAM(S): 20 TABLET, FILM COATED ORAL at 21:40

## 2020-01-01 RX ADMIN — SIMVASTATIN 20 MILLIGRAM(S): 20 TABLET, FILM COATED ORAL at 21:42

## 2020-01-01 RX ADMIN — SODIUM CHLORIDE 3000 MILLILITER(S): 9 INJECTION INTRAMUSCULAR; INTRAVENOUS; SUBCUTANEOUS at 13:19

## 2020-01-01 RX ADMIN — CEFEPIME 100 MILLIGRAM(S): 1 INJECTION, POWDER, FOR SOLUTION INTRAMUSCULAR; INTRAVENOUS at 18:35

## 2020-01-01 RX ADMIN — CEFEPIME 100 MILLIGRAM(S): 1 INJECTION, POWDER, FOR SOLUTION INTRAMUSCULAR; INTRAVENOUS at 05:44

## 2020-01-01 RX ADMIN — Medication 25 MILLIGRAM(S): at 10:34

## 2020-01-01 RX ADMIN — Medication 250 MILLIGRAM(S): at 18:18

## 2020-01-01 RX ADMIN — TAMOXIFEN CITRATE 20 MILLIGRAM(S): 20 TABLET, FILM COATED ORAL at 11:50

## 2020-01-01 RX ADMIN — SIMVASTATIN 20 MILLIGRAM(S): 20 TABLET, FILM COATED ORAL at 21:26

## 2020-01-01 RX ADMIN — Medication 25 MILLIGRAM(S): at 10:22

## 2020-01-01 RX ADMIN — Medication 250 MILLIGRAM(S): at 05:45

## 2020-01-01 RX ADMIN — Medication 20 MILLIEQUIVALENT(S): at 08:51

## 2020-01-01 RX ADMIN — MAGNESIUM OXIDE 400 MG ORAL TABLET 400 MILLIGRAM(S): 241.3 TABLET ORAL at 17:31

## 2020-01-01 RX ADMIN — LATANOPROST 1 DROP(S): 0.05 SOLUTION/ DROPS OPHTHALMIC; TOPICAL at 22:48

## 2020-01-01 RX ADMIN — CEFEPIME 100 MILLIGRAM(S): 1 INJECTION, POWDER, FOR SOLUTION INTRAMUSCULAR; INTRAVENOUS at 05:46

## 2020-01-01 RX ADMIN — APIXABAN 5 MILLIGRAM(S): 2.5 TABLET, FILM COATED ORAL at 18:18

## 2020-01-01 RX ADMIN — SODIUM CHLORIDE 100 MILLILITER(S): 9 INJECTION, SOLUTION INTRAVENOUS at 05:24

## 2020-01-01 RX ADMIN — Medication 20 MILLIGRAM(S): at 21:29

## 2020-01-01 RX ADMIN — CEFEPIME 100 MILLIGRAM(S): 1 INJECTION, POWDER, FOR SOLUTION INTRAMUSCULAR; INTRAVENOUS at 18:17

## 2020-01-01 RX ADMIN — MONTELUKAST 10 MILLIGRAM(S): 4 TABLET, CHEWABLE ORAL at 12:00

## 2020-01-01 RX ADMIN — Medication 25 GRAM(S): at 01:50

## 2020-01-01 RX ADMIN — MONTELUKAST 10 MILLIGRAM(S): 4 TABLET, CHEWABLE ORAL at 11:52

## 2020-01-01 RX ADMIN — MONTELUKAST 10 MILLIGRAM(S): 4 TABLET, CHEWABLE ORAL at 11:38

## 2020-01-01 RX ADMIN — Medication 250 MILLIGRAM(S): at 17:59

## 2020-01-01 RX ADMIN — SODIUM CHLORIDE 500 MILLILITER(S): 9 INJECTION, SOLUTION INTRAVENOUS at 11:04

## 2020-01-01 RX ADMIN — SIMVASTATIN 20 MILLIGRAM(S): 20 TABLET, FILM COATED ORAL at 22:09

## 2020-01-01 RX ADMIN — MONTELUKAST 10 MILLIGRAM(S): 4 TABLET, CHEWABLE ORAL at 11:11

## 2020-01-01 RX ADMIN — Medication 250 MILLIGRAM(S): at 18:01

## 2020-01-01 RX ADMIN — Medication 250 MILLIGRAM(S): at 17:32

## 2020-01-01 RX ADMIN — Medication 250 MILLIGRAM(S): at 06:01

## 2020-01-01 RX ADMIN — Medication 25 MILLIGRAM(S): at 05:08

## 2020-01-01 RX ADMIN — CEFEPIME 100 MILLIGRAM(S): 1 INJECTION, POWDER, FOR SOLUTION INTRAMUSCULAR; INTRAVENOUS at 17:37

## 2020-01-01 RX ADMIN — CEFEPIME 100 MILLIGRAM(S): 1 INJECTION, POWDER, FOR SOLUTION INTRAMUSCULAR; INTRAVENOUS at 05:56

## 2020-01-01 RX ADMIN — Medication 20 MILLIGRAM(S): at 21:39

## 2020-01-01 RX ADMIN — HYDROMORPHONE HYDROCHLORIDE 0.5 MILLIGRAM(S): 2 INJECTION INTRAMUSCULAR; INTRAVENOUS; SUBCUTANEOUS at 18:34

## 2020-01-01 RX ADMIN — ENOXAPARIN SODIUM 70 MILLIGRAM(S): 100 INJECTION SUBCUTANEOUS at 18:35

## 2020-01-01 RX ADMIN — APIXABAN 5 MILLIGRAM(S): 2.5 TABLET, FILM COATED ORAL at 17:31

## 2020-01-01 RX ADMIN — Medication 250 MILLIGRAM(S): at 05:46

## 2020-01-01 RX ADMIN — MAGNESIUM OXIDE 400 MG ORAL TABLET 400 MILLIGRAM(S): 241.3 TABLET ORAL at 11:52

## 2020-01-01 RX ADMIN — APIXABAN 5 MILLIGRAM(S): 2.5 TABLET, FILM COATED ORAL at 05:47

## 2020-01-01 RX ADMIN — CEFEPIME 100 MILLIGRAM(S): 1 INJECTION, POWDER, FOR SOLUTION INTRAMUSCULAR; INTRAVENOUS at 05:50

## 2020-01-01 RX ADMIN — Medication 10 MILLIGRAM(S): at 06:01

## 2020-01-01 RX ADMIN — Medication 2 BOTTLE: at 20:10

## 2020-01-01 RX ADMIN — CEFEPIME 100 MILLIGRAM(S): 1 INJECTION, POWDER, FOR SOLUTION INTRAMUSCULAR; INTRAVENOUS at 17:34

## 2020-01-01 RX ADMIN — SIMVASTATIN 20 MILLIGRAM(S): 20 TABLET, FILM COATED ORAL at 22:20

## 2020-01-01 RX ADMIN — TAMOXIFEN CITRATE 20 MILLIGRAM(S): 20 TABLET, FILM COATED ORAL at 11:03

## 2020-01-01 RX ADMIN — Medication 2 MILLIGRAM(S): at 11:03

## 2020-01-01 RX ADMIN — TAMOXIFEN CITRATE 20 MILLIGRAM(S): 20 TABLET, FILM COATED ORAL at 12:19

## 2020-01-01 RX ADMIN — CHLORHEXIDINE GLUCONATE 1 APPLICATION(S): 213 SOLUTION TOPICAL at 05:27

## 2020-02-21 PROBLEM — D50.9 MICROCYTIC ANEMIA: Status: ACTIVE | Noted: 2019-03-06

## 2020-02-21 PROBLEM — R00.0 SINUS TACHYCARDIA: Status: ACTIVE | Noted: 2019-07-01

## 2020-02-21 NOTE — ASSESSMENT
[FreeTextEntry1] : 72 y/o FM w/ PMHx of Breast ca (ER+/ND+) s/p lumpectomy, Uterine ca and cervical cancer w/ mets to the bone, HTN, COPD, DLD and osteoporosis presents for routine visit she is complaining of rhinorrhea ;allergic rhinitis symptoms \par \par #allergic rhinitis \par claritin ordered \par \par #microcytic anemia \par anemia of CD and iron deficiency anemia\par started iron oral \par \par #HTN \par - not well controlled\par - c/w PO BP meds - \par \par #Uterine ca/Breast ca\par - c/w carboplatin and paxel\par - f/u Onc\par \par #CKD stage 3A\par stable \par #DLD\par - c/w statin\par \par #T2DM - A1c 5.4\par - stable \par \par # Rt DVT:\par - urged patient to get medical records\par -c/w eliquis \par \par #HCM\par - f/u in 6 months and prn\par - will order routine blood work for next visit. \par \par

## 2020-02-21 NOTE — PHYSICAL EXAM
[No Acute Distress] : no acute distress [No JVD] : no jugular venous distention [No Respiratory Distress] : no respiratory distress  [Clear to Auscultation] : lungs were clear to auscultation bilaterally [No Accessory Muscle Use] : no accessory muscle use [Normal S1, S2] : normal S1 and S2 [No Edema] : there was no peripheral edema [Soft] : abdomen soft [Non Tender] : non-tender [de-identified] : sinus tachycardia

## 2020-02-21 NOTE — HISTORY OF PRESENT ILLNESS
[FreeTextEntry1] : follow up  [de-identified] : 75 y/o FM w/ PMHx of Breast ca (ER+/MD+) s/p lumpectomy, Uterine ca and cervical cancer w/ mets to the bone, HTN, COPD, DLD and osteoporosis, DVT on eliquis presents for  regular check up \par she is complaining of allergic rhinitis symptoms with rhinorrhea; no fever, no chills \par otherwise ROS is negative - no active complaints

## 2020-07-11 NOTE — ED ADULT NURSE REASSESSMENT NOTE - NS ED NURSE REASSESS COMMENT FT1
pt refused indwelling catherine catheter and verbalized she is able to pee in the bed pan, bed placed and Dr. Saldana made aware, explain to pt benefits of having catherine at this time. will cont to monitor.

## 2020-07-11 NOTE — CONSULT NOTE ADULT - SUBJECTIVE AND OBJECTIVE BOX
UROLOGY:      HPI:  73 yo F with PMH of Left Breast CA (radiation 2 years ago), Cervical CA (chemoradiation 15 years ago), COPD, DM, HTN, osteoporosis presents to ED with R sided abdominal pain and diarrhea x 3 weeks. Urology called for CT A/P showing severe right hydronephrosis and dilatation of R ureter suggesting obstruction 2* encasement of right ureter Patient seen and examined at bedside. Patient reports abdominal pain located in sharp RLQ, constant, non-radiating that began 2 days ago. Patient endorses associated watery diarrhea that began 3 weeks ago. Denies any fever, chills, SOB, N/V, CP, dysuria, hematuria, flank pain.     [ x ] A 10 Point Review of Systems was negative except where noted  [    ] Due to altered mental status/intubation, subjective information was not able to be obtained from the patient. History was obtained to the extent possible from review of the chart and collateral sources of information.     PAST MEDICAL & SURGICAL HISTORY:  Osteoporosis  Obesity: bmi 41  COPD (chronic obstructive pulmonary disease)  Asthma: last attack 1/2018- hospitalized&amp; steroids  Diabetes  Hypertension  Breast cancer: left, radiation ~2 yrs ago  Cervical cancer: radiation&amp; chemo ~15 yrs ago  History of appendectomy  History of lumpectomy of left breast      MEDICATIONS  (STANDING):    MEDICATIONS  (PRN):  diphenhydrAMINE   Injectable 50 milliGRAM(s) IntraMuscular every 4 hours PRN Rash and/or Itching      Allergies    iodinated radiocontrast agents (Other)  penicillin (Unknown)    Intolerances        SOCIAL HISTORY: No illicit drug use    FAMILY HISTORY:  Family history of breast cancer (Mother, Sibling): mother&amp; sister      Vital Signs Last 24 Hrs  T(C): 37.3 (11 Jul 2020 13:34), Max: 37.3 (11 Jul 2020 13:34)  T(F): 99.1 (11 Jul 2020 13:34), Max: 99.1 (11 Jul 2020 13:34)  HR: 110 (11 Jul 2020 13:34) (110 - 110)  BP: 110/51 (11 Jul 2020 13:34) (110/51 - 110/51)  RR: 18 (11 Jul 2020 13:34) (18 - 18)  SpO2: 100% (11 Jul 2020 13:34) (100% - 100%)    Physical Exam:   Constitutional: NAD, Patient lying comfortably in bed, well-developed  HEENT: EOMI  Neck: no pain  Back: No CVA tenderness b/l   Respiratory: No accessory respiratory muscle use  Abd: + Large pannus, + TTP to RLQ, Soft, ND , bladder non-palpable   :  Patient voids freely, no suprapubic tenderness   Extremities: no edema  Neurological: A/O x 3  Psychiatric: Normal mood, normal affect  Skin: No rashes    Labs                      8.4    10.73 )-----------( 232      ( 11 Jul 2020 15:55 )             26.9     11 Jul 2020 15:55    134    |  100    |  16     ----------------------------<  100    3.8     |  20     |  1.1      Ca    8.0        11 Jul 2020 15:55            RADIOLOGY/IMAGING:   < from: CT Abdomen and Pelvis No Cont (07.11.20 @ 17:50) >  EXAM:  CT ABDOMEN AND PELVIS            PROCEDURE DATE:  07/11/2020            INTERPRETATION:  CLINICAL STATEMENT:    TECHNIQUE: Contiguous axial CT images were obtained from the lower chest to the pubic symphysis without intravenous contrast.  Oral contrast was not administered.  Reformatted images in the coronal and sagittal planes were acquired.    COMPARISON CT: CT abdomen and pelvis 1/22/2018    OTHER STUDIES USED FOR CORRELATION: None.       FINDINGS:    LOWER CHEST: Trace right and small left-sided pleural effusions. Coronary artery calcifications.    HEPATOBILIARY: 1.8 cm low density lesion lateral segment left hepatic lobe which appears new and further evaluation with ultrasound and/or contrast CT suggested    SPLEEN: Unremarkable.    PANCREAS: Unremarkable.    ADRENAL GLANDS: Unremarkable.    KIDNEYS: Severe right hydronephrosis and dilatation of the right ureter suggesting obstruction secondary to encasement of the right ureter by the pelvic mass.    ABDOMINOPELVIC NODES: Decreased left para-aortic predominantly fat 3 x 1.5 cm retroperitoneal structure, likely fatty lymph node.     PELVIC ORGANS: Intra-abdominal pelvic ascites obscures underlying pelvic organs. Decreased, previously 11.7 cm, pelvic mass now measuring 7 x3.2 x 6 cm and containing air-fluid levels.    PERITONEUM/MESENTERY/BOWEL: Multiple small bowel loops containing air-fluid levels without distention or obvious point of obstruction.     BONES/SOFT TISSUES: Diffuse osseous sclerotic lesions throughoutthe bony pelvis and thoracolumbar spine..    OTHER:    IMPRESSION:  Since January 22, 2018;  Previously 11.7 cm pelvic mass now measuring 7 cm and containing air-fluid levels. Findings may reflect necrotic component versus postsurgical change.  Hepatic lesion and ultrasound suggested  Moderate ascites  .Severe right hydronephrosis and dilatation of the right ureter suggesting obstruction secondary to encasement of the right ureter by the pelvic mass.    Multiple small bowel loops containing air-fluid levels without distention or obvious point of obstruction.    Diffuse osseous sclerotic lesions throughout the bony pelvis and thoracolumbar spine suggesting metastatic bone disease.      BONNIE TINEO M.D., RESIDENT RADIOLOGIST  This document has been electronically signed.  ASHISH MIJARES M.D., ATTENDING RADIOLOGIST  This document has been electronically signed. Jul 11 2020  6:33PM    < end of copied text >

## 2020-07-11 NOTE — ED ADULT NURSE NOTE - CHIEF COMPLAINT QUOTE
Pt sent in from [facility] for abd pain that started today. EMS states not COVID as per NH.

## 2020-07-11 NOTE — ED PROVIDER NOTE - PHYSICAL EXAMINATION
Gen: NAD, AOx3  Head: NCAT  HEENT: PERRL, oral mucosa moist, normal conjunctiva, oropharynx clear without exudate or erythema  Lung: CTAB, no respiratory distress, no wheezing, rales, rhonchi  CV: normal s1/s2, rrr, Normal perfusion, pulses 2+ throughout  Abd: soft, RLQ tenderness, ND, no CVA tenderness  Genitourinary: no pelvic tenderness  MSK: No edema, no visible deformities, full range of motion in all 4 extremities  Neuro: CN II-XII grossly intact, No focal neurologic deficits  Skin: No rash   Psych: normal affect

## 2020-07-11 NOTE — ED PROVIDER NOTE - PROGRESS NOTE DETAILS
Attending Note: I personally evaluated the patient. I reviewed the Physician Assistant’s note and agree with the findings and plan except as documented in my note.   75 yo F PMH breast CA, uterine CA, COPD p/w c/o diarrhea x3 weeks and abd pain x2 days. Denies nausea, vomiting, fever, or chills. Describes diarrhea as watery. No CP, SOB, or cough. Pt says she was seen at [facility] for diarrhea last week and says the stool studies were negative at that time. PE: NAD. NCAT. PERRL, EOMI. MMM. RRR. (+) Decreased breath sounds at bases secondary to inspiratory effort. Abdomen soft, ND, (+) RUQ/RLQ tenderness, no rebound or guarding, no CVA tenderness. (+) No pedal edema, no calf tenderness. I&P: Pt with 3 weeks of diarrhea, 2 days of abd pain. Tender on exam to R abd. Will check labs, CT abd/pelvis, c-diff and stool studies. discussed pt w/ urology who will come to evaluate pt.

## 2020-07-11 NOTE — ED PROVIDER NOTE - NS ED ROS FT
Constitutional: (-) fever  Eyes/ENT: (-) visual changes   Cardiovascular: (-) chest pain, (-) syncope  Respiratory: (-) cough, (-) shortness of breath  Gastrointestinal: (-) vomiting, (+) diarrhea  Genitourinary: (-) dysuria, (-) hesitancy, (-) frequency   Musculoskeletal: (-) neck pain, (-) back pain, (-) joint pain  Integumentary: (-) rash, (-) edema  Neurological: (-) headache, (-) altered mental status  Allergic/Immunologic: (-) pruritus

## 2020-07-11 NOTE — ED ADULT TRIAGE NOTE - CHIEF COMPLAINT QUOTE
Pt sent in from [facility] for abd pain that started today. EMS states not COVID as per NH.

## 2020-07-11 NOTE — ED PROVIDER NOTE - OBJECTIVE STATEMENT
75 yo female with a pmh of COPD, HTN, uterine/cervical/breat CA presents for abdominal pain/diarrhea. pt states to have experienced NB diarrhea for 3 weeks and noted sharp RLQ abd pain yesterday. denies any other symptoms including fevers, chill, headache, recent illness/travel, cough, chest pain, or SOB.

## 2020-07-11 NOTE — ED ADULT NURSE NOTE - NSIMPLEMENTINTERV_GEN_ALL_ED
Implemented All Fall with Harm Risk Interventions:  [place] to call system. Call bell, personal items and telephone within reach. Instruct patient to call for assistance. Room bathroom lighting operational. Non-slip footwear when patient is off stretcher. Physically safe environment: no spills, clutter or unnecessary equipment. Stretcher in lowest position, wheels locked, appropriate side rails in place. Provide visual cue, wrist band, yellow gown, etc. Monitor gait and stability. Monitor for mental status changes and reorient to person, place, and time. Review medications for side effects contributing to fall risk. Reinforce activity limits and safety measures with patient and family. Provide visual clues: red socks.

## 2020-07-11 NOTE — ED PROVIDER NOTE - CLINICAL SUMMARY MEDICAL DECISION MAKING FREE TEXT BOX
Pt with R sided abd pain, suspicious for liver met and worsened R hydro, uro consulted and pt to be admittted for further eval and mgt

## 2020-07-11 NOTE — CONSULT NOTE ADULT - ASSESSMENT
75 yo F with PMH of Left Breast CA (radiation 2 years ago), Cervical CA (chemoradiation 15 years ago), COPD, DM, HTN, osteoporosis presents to ED with R sided abdominal pain and diarrhea x 3 weeks. Urology called for CT A/P showing severe right hydronephrosis and dilatation of R ureter suggesting obstruction 2* encasement of right ureter by pelvic mass (7cm).     Plan   - Recommend IR c/s for placement of Right PCN to resolve severe R hydronephrosis 2/2 encasement of R ureter by 7cm pelvic mass   - No acute urological intervention at this time   - Analgesics for pain control prn  -F/u UA and UCx   - C/w IVF    - w/u for diarrhea 75 yo F with PMH of Left Breast CA (radiation 2 years ago), Cervical CA (chemoradiation 15 years ago), COPD, DM, HTN, osteoporosis presents to ED with R sided abdominal pain and diarrhea x 3 weeks. Urology called for CT A/P showing severe right hydronephrosis and dilatation of R ureter suggesting obstruction 2* encasement of right ureter by pelvic mass (7cm).     Plan   - Recommend IR c/s for placement of Right PCN to resolve severe R hydronephrosis 2/2 encasement of R ureter by 7cm pelvic mass   - No acute urological intervention at this time   - Analgesics for pain control prn  -F/u UA and UCx   - C/w IVF    - w/u for diarrhea   - D/w attending

## 2020-07-12 NOTE — H&P ADULT - NSHPSOCIALHISTORY_GEN_ALL_CORE
former smoker, 1pack per week x 10 years, quit at age 29.  no etOH former smoker, 1pack per week x 10 years, quit at age 29.  no etOH  Denies illicit drug use  Lives alone; ambulates w/ walker

## 2020-07-12 NOTE — PROGRESS NOTE ADULT - ASSESSMENT
75 yo F with PMH of cervical cancer s/p radiation therapy (20 years ago), breast cancer (ER+ / PA+) s/p lumpectomy + radiation thereby (6 years ago), uterine cancer  s/p chemotherapy ( 3 years ago) as per patient, metastasis to bones,  COPD not on home oxygen, DM, HTN, osteoporosis, Rt DVT, Anemia of chronic disease, CKD 3 presents to ED with R sided abdominal pain and diarrhea.    # RLQ abdominal pain, secondary to Right sided hydronephrosis due to ureteral obstruction by pelvic mass  - CT A/P shows Previously 11.7 cm pelvic mass now measuring 7 cm and containing air-fluid levels. Findings may reflect necrotic component versus postsurgical change and severe right hydronephrosis and dilatation of the right ureter suggesting obstruction secondary to encasement of the right ureter by the pelvic mass.  - UA + for LKE, and hematuria  - urology consulted ( patient had feces in the Amin catheter, which is suspicious for possible retrourethral fustula)  - pain control w/ morphine 2mg IV q4 prn  - Follow up IR in am for possible percutaneous nephrostomy( patient is on Eliquis so IR require at least 5 days off from Eliquis before procedure)  - hold Eliquis for now, NPO after midnight  - f/u urine cx.   - Intake /Output monitoring    # Persistent diarrhea: gastroenteritis   - elevated lipase noted, no pancreatic lesion on CT, hepatic lesion noted on CT  - follow up stool bacterial cx, C diff, stool for ova and parasites, fecal calprotectin, TSH and iron studies  - consider US abdomen for hepatic lesions.     # Anemia possibly DARSHAN secondary to hematuria  - trend CBC, keep active T&S, transfuse if Hb < 7  - f/u iron studies  - start iron supplementation if DARSHAN    # Hx of cervical / uterine / and breast cancer  - on tamoxifen, and megestrol, carboplatin and paxel as per out patient charts    # Rt DVT  - hold Eliquis    # COPD: stable, c/w home meds  # HTN: c/w home metoprolol and enalapril ( patient BP was 71/38 during the day, received 500cc Bolus NS)  # DM? : f/u HbA1c  # Osteoporosis: c/w Alendronate  # CKD3: stable    # Diet: NPO after midnight  # DVT Prophylaxis: hold Eliquis ( possible surgery?)  # GI Prophylaxis:: Not indicated  # Activity: IAT    # Dispo: from home, lives alone, walks with a walker  # Code Status: full code    Case discussed with Attending Dr Russell, agreed with the plan.

## 2020-07-12 NOTE — H&P ADULT - HISTORY OF PRESENT ILLNESS
73 yo F with PMH of cervical cancer s/p radiation therapy (20 years ago), breast cancer (ER+ / UT+) s/p lumpectomy + radiation thereby (6 years ago), uterine cancer  s/p chemotherapy ( 3 years ago) as per patient, metastasis to bones,  COPD not on home oxygen, DM, HTN, osteoporosis, Rt DVT, Anemia of chronic disease, CKD 3 presents to ED with R sided abdominal pain and diarrhea.   Pt reports gradual onset, episodic, 7/10 RLQ abdominal pain radiating to groin for past 2 days, with no aggravating or relieving factors. Pt reports 100lb weight loss in past 1 year due to malignant cancer.   Pt also reports persistent non bloody, non mucoid, watery diarrhea for past 3 weeks, 4-5 episodes per day, not associated with food intake, and no recent travel or sick contact history. Pt has not passed any stool today.   Denies any fever, cough, chest pain, SOB, n/v, recent travel or sick contacts.     In the ED, vitals were WNL,  Pt received 1L fluid bolus, CT A/P showed severe right hydronephrosis and dilatation of R ureter suggesting obstruction  due to encasement of right ureter. Urology was consulted, recommended no acute intervention at this time, pt refused the Amin catheter placement. Pt will be admitted under medicine for further work up. 75 yo F with PMH of cervical cancer s/p radiation therapy (20 years ago), breast cancer (ER+ / SC+) s/p lumpectomy + radiation thereby (6 years ago), uterine cancer  s/p chemotherapy ( 3 years ago) as per patient, metastasis to bones,  COPD not on home oxygen, DM, HTN, osteoporosis, DVT on eliquis, Anemia of chronic disease, CKD 3 presents to ED with R sided abdominal pain and diarrhea.   Pt reports gradual onset, episodic, 7/10 RLQ abdominal pain radiating to groin for past 2 days, with no aggravating or relieving factors. Pt reports 100lb weight loss in past 1 year due to malignant cancer.   Pt also reports persistent non bloody, non mucoid, watery diarrhea for past 3 weeks, 4-5 episodes per day, not associated with food intake, and no recent travel or sick contact history. Pt has not passed any stool today.   Denies any fever, cough, chest pain, SOB, n/v, recent travel or sick contacts.     In the ED, vitals were WNL,  Pt received 1L fluid bolus, CT A/P showed severe right hydronephrosis and dilatation of R ureter suggesting obstruction  due to encasement of right ureter. Urology was consulted, recommended no acute intervention at this time, pt refused the Amin catheter placement. Pt will be admitted under medicine for further work up.

## 2020-07-12 NOTE — H&P ADULT - NSHPPHYSICALEXAM_GEN_ALL_CORE
T(C): 36.7 (07-11-20 @ 21:30), Max: 37.3 (07-11-20 @ 13:34)  HR: 90 (07-11-20 @ 21:30) (90 - 110)  BP: 120/81 (07-11-20 @ 21:30) (110/51 - 120/81)  RR: 18 (07-11-20 @ 21:30) (18 - 18)  SpO2: 100% (07-11-20 @ 21:30) (100% - 100%)    GENERAL: patient appears well, no acute distress, appropriate, pleasant  EYES: sclera clear, no exudates  ENMT: oropharynx clear without erythema, no exudates, moist mucous membranes  NECK: supple, soft, no thyromegaly noted  LUNGS: good air entry bilaterally, clear to auscultation, symmetric breath sounds, no wheezing or rhonchi appreciated  HEART: soft S1/S2, regular rate and rhythm, no murmurs noted, no lower extremity edema  GASTROINTESTINAL: abdomen is soft, RLQ tenderness, palpable mass in lower abdomen, nondistended, normoactive bowel sounds,   INTEGUMENT: good skin turgor, no lesions noted  MUSCULOSKELETAL: no clubbing or cyanosis, no obvious deformity  NEUROLOGIC: awake, alert, oriented x3, good muscle tone in 4 extremities, no obvious sensory deficits  PSYCHIATRIC: mood is good, affect is congruent, linear and logical thought process  HEME/LYMPH: no palpable supraclavicular nodules, no obvious ecchymosis or petechiae

## 2020-07-12 NOTE — H&P ADULT - NSHPLABSRESULTS_GEN_ALL_CORE
( @ 15:55)                      8.4  10.73 )-----------( 232                 26.9    Neutrophils = 9.56 (89.0%)  Lymphocytes = 0.68 (6.3%)  Eosinophils = 0.04 (0.4%)  Basophils = 0.04 (0.4%)  Monocytes = 0.36 (3.4%)  Bands = --%        134<L>  |  100  |  16  ----------------------------<  100<H>  3.8   |  20  |  1.1    Ca    8.0<L>      2020 15:55    TPro  5.9<L>  /  Alb  2.4<L>  /  TBili  0.4  /  DBili  x   /  AST  15  /  ALT  10  /  AlkPhos  125<H>        CARDIAC MARKERS ( 2020 15:55 )  Trop <0.01 ng/mL / CK x     / CKMB x           Urinalysis Basic - ( 2020 21:20 )    Color: Yellow / Appearance: Turbid / S.020 / pH: x  Gluc: x / Ketone: Negative  / Bili: Negative / Urobili: <2 mg/dL   Blood: x / Protein: 30 mg/dL / Nitrite: Negative   Leuk Esterase: Large / RBC: 9 /HPF /  /HPF   Sq Epi: x / Non Sq Epi: 5 /HPF / Bacteria: Negative  < from: CT Abdomen and Pelvis No Cont (20 @ 17:50) >    Since 2018;  Previously 11.7 cm pelvic mass now measuring 7 cm and containing air-fluid levels. Findings may reflect necrotic component versus postsurgical change.  Hepatic lesion and ultrasound suggested  Moderate ascites  .Severe right hydronephrosis and dilatation of the right ureter suggesting obstruction secondary to encasement of the right ureter by the pelvic mass.    Multiple small bowel loops containing air-fluid levels without distention or obvious point of obstruction.    Diffuse osseous sclerotic lesions throughout the bony pelvis and thoracolumbar spine suggesting metastatic bone disease.    < end of copied text >

## 2020-07-12 NOTE — H&P ADULT - ASSESSMENT
73 yo F with PMH of cervical cancer s/p radiation therapy (20 years ago), breast cancer (ER+ / [place]+) s/p lumpectomy + radiation thereby (6 years ago), uterine cancer  s/p chemotherapy ( 3 years ago) as per patient, metastasis to bones,  COPD not on home oxygen, DM, HTN, osteoporosis, Rt DVT, Anemia of chronic disease, CKD 3 presents to ED with R sided abdominal pain and diarrhea.    # RLQ abdominal pain, secondary to Right sided hydronephrosis due to ureteral obstruction by pelvic mass  - CT A/P shows Previously 11.7 cm pelvic mass now measuring 7 cm and containing air-fluid levels. Findings may reflect necrotic component versus postsurgical change and evere right hydronephrosis and dilatation of the right ureter suggesting obstruction secondary to encasement of the right ureter by the pelvic mass.  - UA + for LKE, and hematuria  - urology consulted, no acute intervention at this time  - pain control w/ morphine 2mg IV q4 prn  - will consult IR in am for PCN  - pt refusing catherine at this time  - strict I&Os  - f/u urine cx.     # Persistent diarrhea: gastroenteritis secondary to viral vs chemotherapy?   - elevated lipase noted, no pancreatic lesion on CT, hepatic lesion noted on CT  - will order stool bacterial cx, stool for ova and parasites, fecal calprotectin, TSH and iron studies  - fluid repletion w/ IVF LR @ 100cc  - consider US abdomen for hepatic lesions.   - does not need any IV abx    # Anemia possibly DARSHAN secondary to hematuria  - trend CBC, keep active T&S, transfuse if Hb < 7  - f/u iron studies  - start iron supplementation if DARSHAN    # Hx of cervical / uterine / and breast cancer  - on tamoxifen, and megestrol, carboplatin and paxel as per out patient charts  - confirm w/ heme/onc Dr. Taco Smith at [facility]    # Rt DVT  - c/w eliquis    # COPD: stable, c/w home meds  # HTN: c/w home metoprolol and enalapril    # DM? : f/u HbA1c  # Osteoporosis: c/w Alendronate  # CKD3: stable    # Diet: DASH, carb restricted  # DVT Prophylaxis: Eliquis  # GI Prophylaxis:: Not indicated  # Activity: IAT  # IV Fluids: LR A 100cc  # Dispo: from home, lives alone, walks with a walker  # Code Status: full code

## 2020-07-12 NOTE — CHART NOTE - NSCHARTNOTEFT_GEN_A_CORE
Pt was admitted with right sided abdominal pain and diarrhea, she has a very extensive PMH ( multiple malignancies, advanced metastatic Dz), she was found to have right sided hydronephrosis, likely needs IR evaluation for percutaneous nephrostomy on Monday, will keep NPO after midnight, case d/w admitting medical attending this morning, pt was not plated to hospitalist service, will follow up on Monday.   In addition pt reports diarrhea, will test stool for WBc, c.dif , monitor and replete electrolytes, start Probiotics, overall prognosis is very poor.

## 2020-07-12 NOTE — H&P ADULT - NSICDXPASTMEDICALHX_GEN_ALL_CORE_FT
PAST MEDICAL HISTORY:  Asthma last attack 1/2018- hospitalized& steroids    Breast cancer left, radiation ~2 yrs ago    Cervical cancer radiation& chemo ~15 yrs ago    COPD (chronic obstructive pulmonary disease)     Diabetes     Hypertension     Obesity bmi 41    Osteoporosis

## 2020-07-12 NOTE — H&P ADULT - ATTENDING COMMENTS
I saw and evaluated the patient. I have reviewed and agree with the findings and plan of care as documented above in the resident’s note (unless indicated differently below). Any necessary changes were made in the body of the text. I saw and evaluated the patient. I have reviewed and agree with the findings and plan of care as documented above in the resident’s note (unless indicated differently below). Any necessary changes were made in the body of the text.    75 yo F pt w/ a relevant and extensive hx of malignancy (cervical, breast and uterine ca w/ bony mets) and CKD 3. P/w abdominal pain. Onset: x two days ago. Course: gradually progressive. Frequency: intermittent waxing and waning. Location: RLQ. Intensity: moderate to severe. Radiating towards the groin. Precipitating factors: malignancy hx. Quality: achy. Associated w/ diarrhea (watery over the past week, was having 4-5 episodes on some days - now w/ no bowel movement x 1-2 days), generalized weakness, decreased PO intake and weight loss. Was seen for diarrhea at [facility] one week ago - says w/u was negative at the time.    ROS:  Constitutional: no fevers; no chills; +generalized weakness; +weight loss  Eyes: no conjunctivitis; no itching  ENT: no dysphagia; no odynophagia  CVS: no PND; no orthopnea; no chest pain  Resp: no SOB; no coughing  GI: no nausea; no vomiting; +diarrhea; +abd pain; +anorexia  : no dysuria; no hematuria  MSK: +generalized aches/pains  Skin: +rash over the lower back; no ulcers  Neuro: no focal weakness; no headache  All other systems reviewed and are negative    PMHx, home medications, SurHx, FHx and Social history as above in the corresponding sections of the note - reviewed and edited where appropriate    Exam:  Vitals: BP = 112/63; P = 118; T = 98.5; RR = 18; SpO2 > 95 on room air  General: appears stated age; cooperative; chronically ill appearing  Eyes: anicteric sclera; moist conjunctiva; PERRL  HENT: NC/AT; clear oropharynx w/ moist mucous membranes  Neck: supple w/ FROM; trachea midline  Lungs: cta b/l with no tachypnea, accessory muscle usage, wheezing, rhonci or rales  CVS: RRR; S1 and S2 w/o MRGs  Abd: BS+; distended but soft; tender to palpation especially over the RLQ; no HSM  Ext: non-edematous; distal pulses intact b/l  Skin: normal temp, turgor and texture; no rashes, ulcers or nodules  Neuro: CN II-XII intact; str 5/5 throughout; sensation grossly intact – light touch/temp  Psych: appropriate affect; alert and oriented to person, place, time and situation    Labs significant for H&H 8.4/26.9, MCV 79.8, Na 134, alb 2.4, alk phos 125  U/A w/ 142 WBC's and large LE  CT abd/pelvis non-con: pelvic mass - 7 cm; hepatic lesion; moderate ascites; severe rt hydroureteronephrosis; diffuse osseous sclerotic lesions throughout bony pelvis and thoracolumbar spine - mets  EKG: sinus tach; qTC 427    Assessment:  (1) Severe rt hydroureteronephrosis 2/2 obstruction assoc w/ pelvic mass  (2) Abdominal pain - non-specific:  --- Could be 2/2 aforementioned  findings  --- Could also be due to her malignancy in general or bone mets  --- Could be due to gastroenteritis/infectious etiology - given diarrhea  (3) Hepatic lesion w/ ascites - suspect malignant etiology  (4) Microcytic anemia - AOCD in setting of malignancy + superimposed Fe def  (5) At least moderate PCM (alb 2.1 and severe weight loss)  (6) Extensive malignancy hx: ER+/CA+ breast ca; cervical ca; uterine ca)  (7) Asthma - stable respiratory status  (8) HTN - controlled  (9) CKD 3 - stable Cr (rt hydroureteroneph as above)    Plan:  (1) IR evaluation for rt percutaneous nephrostomy  (2) Hydration  (3) PRN analgesics  (4) Intake and output monitoring  (5) No diarrhea for 1-2 days now but if recurs, send stool studies:  --- Fecal leukocytes, culture, C. diff  (6) Hold off on abx for now  (7) U/S to evaluate hepatic lesion  (8) Unsure what, if any, treatment or w/u pt is receiving for her malignancy:  --- Hepatic lesion, ascites and aforementioned could be progression of malignancy  --- She told me that she has not had recent chemotherapy  --- Out-pt records from pt's Onc Dr. Taco Smith at [facility]  (9) Fe studies, retics  (10) Optimize nutritional status  (11) Medications as dosed  (12) Supportive care    Code status: full code I saw and evaluated the patient. I have reviewed and agree with the findings and plan of care as documented above in the resident’s note (unless indicated differently below). Any necessary changes were made in the body of the text.    75 yo F pt w/ a relevant and extensive hx of malignancy (cervical, breast and uterine ca w/ bony mets) and CKD 3. P/w abdominal pain. Onset: x two days ago. Course: gradually progressive. Frequency: intermittent waxing and waning. Location: RLQ. Intensity: moderate to severe. Radiating towards the groin. Precipitating factors: malignancy hx. Quality: achy. Associated w/ diarrhea (watery over the past week, was having 4-5 episodes on some days - now w/ no bowel movement x 1-2 days), generalized weakness, decreased PO intake and weight loss. Was seen for diarrhea at [facility] one week ago - says w/u was negative at the time.    ROS:  Constitutional: no fevers; no chills; +generalized weakness; +weight loss  Eyes: no conjunctivitis; no itching  ENT: no dysphagia; no odynophagia  CVS: no PND; no orthopnea; no chest pain  Resp: no SOB; no coughing  GI: no nausea; no vomiting; +diarrhea; +abd pain; +anorexia  : no dysuria; no hematuria  MSK: +generalized aches/pains  Skin: +rash over the lower back; no ulcers  Neuro: no focal weakness; no headache  All other systems reviewed and are negative    PMHx, home medications, SurHx, FHx and Social history as above in the corresponding sections of the note - reviewed and edited where appropriate    Exam:  Vitals: BP = 112/63; P = 118; T = 98.5; RR = 18; SpO2 > 95 on room air  General: appears stated age; cooperative; chronically ill appearing  Eyes: anicteric sclera; moist conjunctiva; PERRL  HENT: NC/AT; clear oropharynx w/ moist mucous membranes  Neck: supple w/ FROM; trachea midline  Lungs: cta b/l with no tachypnea, accessory muscle usage, wheezing, rhonci or rales  CVS: RRR; S1 and S2 w/o MRGs  Abd: BS+; distended but soft; tender to palpation especially over the RLQ; no HSM  Ext: non-edematous; distal pulses intact b/l  Skin: normal temp, turgor and texture; no rashes, ulcers or nodules  Neuro: CN II-XII intact; str 5/5 throughout; sensation grossly intact – light touch/temp  Psych: appropriate affect; alert and oriented to person, place, time and situation    Labs significant for H&H 8.4/26.9, MCV 79.8, Na 134, alb 2.4, alk phos 125  U/A w/ 142 WBC's and large LE  CT abd/pelvis non-con: pelvic mass - 7 cm; hepatic lesion; moderate ascites; severe rt hydroureteronephrosis; diffuse osseous sclerotic lesions throughout bony pelvis and thoracolumbar spine - mets  EKG: sinus tach; qTC 427    Assessment:  (1) Severe rt hydroureteronephrosis 2/2 obstruction assoc w/ pelvic mass  (2) Abdominal pain - non-specific:  --- Could be 2/2 aforementioned  findings  --- Could also be due to her malignancy in general or bone mets  --- Could be due to gastroenteritis/infectious etiology - given diarrhea  (3) Hepatic lesion w/ ascites - suspect malignant etiology  (4) Microcytic anemia - AOCD in setting of malignancy + superimposed Fe def  (5) At least moderate PCM (alb 2.1 and severe weight loss)  (6) Extensive malignancy hx: ER+/UT+ breast ca; cervical ca; uterine ca)  (7) Asthma - stable respiratory status  (8) HTN - controlled  (9) CKD 3 - stable Cr (rt hydroureteroneph as above)    Plan:  (1) IR evaluation for rt percutaneous nephrostomy  (2) Hydration  (3) PRN analgesics  (4) Intake and output monitoring  (5) No diarrhea for 1-2 days now but if recurs, send stool studies:  --- Fecal leukocytes, culture, C. diff  (6) Hold off on abx for now; f/u urine culture  (7) U/S to evaluate hepatic lesion  (8) Unsure what, if any, treatment or w/u pt is receiving for her malignancy:  --- Hepatic lesion, ascites and aforementioned could be progression of malignancy  --- She told me that she has not had recent chemotherapy  --- Out-pt records from pt's Onc Dr. Taco Smith at [facility]  (9) Fe studies, retics  (10) Optimize nutritional status  (11) Medications as dosed  (12) Supportive care  (13) Dispo:  Prior to d/c, need to address the hydroureteronephrosis, abd pain and need to ascertain if there is an appropriate plan of care in place in regards to the patient's malignancy (given that she could have possible progression of disease)    Code status: full code I saw and evaluated the patient. I have reviewed and agree with the findings and plan of care as documented above in the resident’s note (unless indicated differently below). Any necessary changes were made in the body of the text.    75 yo F pt w/ a relevant and extensive hx of malignancy (cervical, breast and uterine ca w/ bony mets) and CKD 3. P/w abdominal pain. Onset: x two days ago. Course: gradually progressive. Frequency: intermittent waxing and waning. Location: RLQ. Intensity: moderate to severe. Radiating towards the groin. Precipitating factors: malignancy hx. Quality: achy. Associated w/ diarrhea (watery over the past week, was having 4-5 episodes on some days - now w/ no bowel movement x 1-2 days), generalized weakness, decreased PO intake and weight loss. Was seen for diarrhea at [facility] one week ago - says w/u was negative at the time.    ROS:  Constitutional: no fevers; no chills; +generalized weakness; +weight loss  Eyes: no conjunctivitis; no itching  ENT: no dysphagia; no odynophagia  CVS: no PND; no orthopnea; no chest pain  Resp: no SOB; no coughing  GI: no nausea; no vomiting; +diarrhea; +abd pain; +anorexia  : no dysuria; no hematuria  MSK: +generalized aches/pains  Skin: +rash over the lower back; no ulcers  Neuro: no focal weakness; no headache  All other systems reviewed and are negative    PMHx, home medications, SurHx, FHx and Social history as above in the corresponding sections of the note - reviewed and edited where appropriate    Exam:  Vitals: BP = 112/63; P = 118; T = 98.5; RR = 18; SpO2 > 95 on room air  General: appears stated age; cooperative; chronically ill appearing  Eyes: anicteric sclera; moist conjunctiva; PERRL  HENT: NC/AT; clear oropharynx w/ moist mucous membranes  Neck: supple w/ FROM; trachea midline  Lungs: cta b/l with no tachypnea, accessory muscle usage, wheezing, rhonci or rales  CVS: RRR; S1 and S2 w/o MRGs  Abd: BS+; distended but soft; tender to palpation especially over the RLQ; no HSM  Ext: non-edematous; distal pulses intact b/l  Skin: normal temp, turgor and texture; no rashes, ulcers or nodules  Neuro: CN II-XII intact; str 5/5 throughout; sensation grossly intact – light touch/temp  Psych: appropriate affect; alert and oriented to person, place, time and situation    Labs significant for H&H 8.4/26.9, MCV 79.8, Na 134, alb 2.4, alk phos 125  U/A w/ 142 WBC's and large LE  CT abd/pelvis non-con: pelvic mass - 7 cm; hepatic lesion; moderate ascites; severe rt hydroureteronephrosis; diffuse osseous sclerotic lesions throughout bony pelvis and thoracolumbar spine - mets  EKG: sinus tach; qTC 427    Assessment:  (1) Severe rt hydroureteronephrosis 2/2 obstruction assoc w/ pelvic mass  (2) Abdominal pain - non-specific:  --- Could be 2/2 aforementioned  findings  --- Could also be due to her malignancy in general or bone mets  --- Could be due to gastroenteritis/infectious etiology - given diarrhea  (3) Hepatic lesion w/ ascites - suspect malignant etiology  (4) Microcytic anemia - AOCD in setting of malignancy + superimposed Fe def  (5) At least moderate PCM (alb 2.1 and severe weight loss)  (6) Extensive malignancy hx: ER+/MO+ breast ca; cervical ca; uterine ca)  (7) Asthma - stable respiratory status  (8) HTN - controlled  (9) CKD 3 - stable Cr (rt hydroureteroneph as above)  (10) Hx of DVT: on apixaban    Plan:  (1) IR evaluation for rt percutaneous nephrostomy  (2) Hydration  (3) PRN analgesics  (4) Intake and output monitoring  (5) No diarrhea for 1-2 days now but if recurs, send stool studies:  --- Fecal leukocytes, culture, C. diff  (6) Hold off on abx for now; f/u urine culture  (7) U/S to evaluate hepatic lesion  (8) Unsure what, if any, treatment or w/u pt is receiving for her malignancy:  --- Hepatic lesion, ascites and aforementioned could be progression of malignancy  --- She told me that she has not had recent chemotherapy  --- Out-pt records from pt's Onc Dr. Taco Smith at [facility]  (9) Fe studies, retics  (10) Optimize nutritional status  (11) Medications as dosed  (12) Supportive care  (13) Dispo:  Prior to d/c, need to address the hydroureteronephrosis, abd pain and need to ascertain if there is an appropriate plan of care in place in regards to the patient's malignancy (given that she could have possible progression of disease)    Code status: full code

## 2020-07-12 NOTE — PROGRESS NOTE ADULT - SUBJECTIVE AND OBJECTIVE BOX
SUBJECTIVE:    Patient is a 74y old Female who presents with a chief complaint of abdominal pain (2020 09:01)    Currently admitted to medicine with the primary diagnosis of Hydronephrosis     Today is hospital day 1d. This morning she is resting comfortably in bed and reports no new issues or overnight events. Patient was hypotensive in the morning, was given 500cc bolus NS. Patient was noted to have feces in her Amin catheter, suspicious for the rectourethral fistula.    PAST MEDICAL & SURGICAL HISTORY  Osteoporosis  Obesity: bmi 41  COPD (chronic obstructive pulmonary disease)  Asthma: last attack 2018- hospitalized&amp; steroids  Diabetes  Hypertension  Breast cancer: left, radiation ~2 yrs ago  Cervical cancer: radiation&amp; chemo ~15 yrs ago  History of appendectomy  History of lumpectomy of left breast    SOCIAL HISTORY:  Negative for smoking/alcohol/drug use.     ALLERGIES:  iodinated radiocontrast agents (Other)  penicillin (Unknown)    MEDICATIONS:  STANDING MEDICATIONS  chlorhexidine 4% Liquid 1 Application(s) Topical <User Schedule>  enalapril 10 milliGRAM(s) Oral daily  lactated ringers. 1000 milliLiter(s) IV Continuous <Continuous>  latanoprost 0.005% Ophthalmic Solution 1 Drop(s) Both EYES at bedtime  metoprolol succinate ER 25 milliGRAM(s) Oral daily  montelukast 10 milliGRAM(s) Oral daily  saccharomyces boulardii 250 milliGRAM(s) Oral two times a day  simvastatin 20 milliGRAM(s) Oral at bedtime    PRN MEDICATIONS  ALBUTerol    90 MICROgram(s) HFA Inhaler 2 Puff(s) Inhalation every 6 hours PRN  diphenhydrAMINE 25 milliGRAM(s) Oral every 4 hours PRN  morphine  - Injectable 2 milliGRAM(s) IV Push every 4 hours PRN    VITALS:   T(F): 98.3  HR: 103  BP: 100/43  RR: 18  SpO2: 100%    LABS:                        7.5    10.89 )-----------( 293      ( 2020 18:08 )             24.4     07-11    134<L>  |  100  |  16  ----------------------------<  100<H>  3.8   |  20  |  1.1    Ca    8.0<L>      2020 15:55    TPro  5.9<L>  /  Alb  2.4<L>  /  TBili  0.4  /  DBili  x   /  AST  15  /  ALT  10  /  AlkPhos  125<H>        Urinalysis Basic - ( 2020 21:20 )    Color: Yellow / Appearance: Turbid / S.020 / pH: x  Gluc: x / Ketone: Negative  / Bili: Negative / Urobili: <2 mg/dL   Blood: x / Protein: 30 mg/dL / Nitrite: Negative   Leuk Esterase: Large / RBC: 9 /HPF /  /HPF   Sq Epi: x / Non Sq Epi: 5 /HPF / Bacteria: Negative            CARDIAC MARKERS ( 2020 15:55 )  x     / <0.01 ng/mL / x     / x     / x          RADIOLOGY:  < from: CT Abdomen and Pelvis No Cont (20 @ 17:50) >  IMPRESSION:  Since 2018;  Previously 11.7 cm pelvic mass now measuring 7 cm and containing air-fluid levels. Findings may reflect necrotic component versus postsurgical change.  Hepatic lesion and ultrasound suggested  Moderate ascites  .Severe right hydronephrosis and dilatation of the right ureter suggesting obstruction secondary to encasement of the right ureter by the pelvic mass.    Multiple small bowel loops containing air-fluid levels without distention or obvious point of obstruction.    Diffuse osseous sclerotic lesions throughout the bony pelvis and thoracolumbar spine suggesting metastatic bone disease.    < end of copied text >      PHYSICAL EXAM:  GEN: No acute distress, right arm swelling at the IV access ( midline was replaced later during the day)  LUNGS: Clear to auscultation bilaterally, no wheezes  HEART: Regular rare, +s1 s2,  ABD: Soft, non-tender, non-distended. no abdominal tenderness to palpation   EXT: NC/NE/ Skin Intact.   NEURO: AAOX3    Indwelling Urethral Catheter:     Connect To:  Straight Drainage/Bois D Arc    Indication:  Urologic Procedure,  Surgery (20 @ 20:01) (not performed) [active]

## 2020-07-12 NOTE — H&P ADULT - NSICDXFAMILYHX_GEN_ALL_CORE_FT
FAMILY HISTORY:  Mother  Still living? No  Family history of breast cancer, Age at diagnosis: Age Unknown    Sibling  Still living? No  Family history of breast cancer, Age at diagnosis: Age Unknown

## 2020-07-12 NOTE — CONSULT NOTE ADULT - SUBJECTIVE AND OBJECTIVE BOX
INTERVENTIONAL RADIOLOGY CONSULT:     Procedure Requested: Percutaneous nephrostomy    HPI:  74F with PMH of cervical cancer s/p radiation therapy (20 years ago), breast cancer (ER+ / CT+) s/p lumpectomy + radiation thereby (6 years ago), uterine cancer s/p chemotherapy ( 3 years ago) per patient, metastasis to bones, COPD not on home oxygen, DM, HTN, osteoporosis, DVT on eliquis, Anemia of chronic disease, CKD 3 presents to ED with R sided abdominal pain and diarrhea.  Pt reports gradual onset, episodic, 7/10 RLQ abdominal pain radiating to groin for past 2 days, with no aggravating or relieving factors. Pt reports 100lb weight loss in past 1 year due to malignant cancer.   Pt also reports persistent non bloody, non mucoid, watery diarrhea for past 3 weeks, 4-5 episodes per day, not associated with food intake, and no recent travel or sick contact history. Pt has not passed any stool today.   Denies any fever, cough, chest pain, SOB, n/v, recent travel or sick contacts.     In the ED, vitals were WNL,  Pt received 1L fluid bolus, CT A/P showed severe right hydronephrosis and dilatation of R ureter suggesting obstruction  due to encasement of right ureter. Urology was consulted, recommended no acute intervention at this time, pt refused the Amin catheter placement. Pt will be admitted under medicine for further work up. (12 Jul 2020 01:03)      PAST MEDICAL & SURGICAL HISTORY:  Osteoporosis  Obesity: bmi 41  COPD (chronic obstructive pulmonary disease)  Asthma: last attack 1/2018- hospitalized&amp; steroids  Diabetes  Hypertension  Breast cancer: left, radiation ~2 yrs ago  Cervical cancer: radiation&amp; chemo ~15 yrs ago  History of appendectomy  History of lumpectomy of left breast      MEDICATIONS  (STANDING):  apixaban 5 milliGRAM(s) Oral every 12 hours  chlorhexidine 4% Liquid 1 Application(s) Topical <User Schedule>  enalapril 10 milliGRAM(s) Oral daily  lactated ringers. 1000 milliLiter(s) (100 mL/Hr) IV Continuous <Continuous>  latanoprost 0.005% Ophthalmic Solution 1 Drop(s) Both EYES at bedtime  metoprolol succinate ER 25 milliGRAM(s) Oral daily  montelukast 10 milliGRAM(s) Oral daily  simvastatin 20 milliGRAM(s) Oral at bedtime    MEDICATIONS  (PRN):  ALBUTerol    90 MICROgram(s) HFA Inhaler 2 Puff(s) Inhalation every 6 hours PRN Bronchospasm  diphenhydrAMINE   Injectable 50 milliGRAM(s) IntraMuscular every 4 hours PRN Rash and/or Itching  morphine  - Injectable 2 milliGRAM(s) IV Push every 4 hours PRN Severe Pain (7 - 10)      Allergies    iodinated radiocontrast agents (Other)  penicillin (Unknown)    Intolerances    FAMILY HISTORY:  Family history of breast cancer (Mother, Sibling): mother&amp; sister      Physical Exam:   Vital Signs Last 24 Hrs  T(C): 36.9 (12 Jul 2020 05:00), Max: 37.3 (11 Jul 2020 13:34)  T(F): 98.5 (12 Jul 2020 05:00), Max: 99.1 (11 Jul 2020 13:34)  HR: 118 (12 Jul 2020 05:00) (90 - 118)  BP: 112/63 (12 Jul 2020 05:00) (90/54 - 120/81)  BP(mean): --  RR: 18 (12 Jul 2020 05:00) (18 - 18)  SpO2: 100% (12 Jul 2020 08:27) (100% - 100%)      Labs:                         8.4    10.73 )-----------( 232      ( 11 Jul 2020 15:55 )             26.9     07-11    134<L>  |  100  |  16  ----------------------------<  100<H>  3.8   |  20  |  1.1    Ca    8.0<L>      11 Jul 2020 15:55    TPro  5.9<L>  /  Alb  2.4<L>  /  TBili  0.4  /  DBili  x   /  AST  15  /  ALT  10  /  AlkPhos  125<H>  07-11        Pertinent labs:                      8.4    10.73 )-----------( 232      ( 11 Jul 2020 15:55 )             26.9       07-11    134<L>  |  100  |  16  ----------------------------<  100<H>  3.8   |  20  |  1.1    Ca    8.0<L>      11 Jul 2020 15:55    TPro  5.9<L>  /  Alb  2.4<L>  /  TBili  0.4  /  DBili  x   /  AST  15  /  ALT  10  /  AlkPhos  125<H>  07-11          Radiology & Additional Studies:     Radiology imaging reviewed.       ASSESSMENT AND PLAN:  74F with extensive history of gynecologic cancers and multiple medical morbidities who presented with gradual onset radicular abdominal pain. CT demonstrates a residual 7 cm pelvic mass likely encasing the distal right ureter with increased right hydronephrosis, new ascites. Patient is anemic and tachypneic. Normal creatinine. Per discussion with medicine, patient last took Eliquis this morning.    Overall complicated case with symptoms which may not necessarily be attributed to obstructive uropathy and no definite acute renopathy. Will require further discussion with Urology. A nephrostomy would likely be effectively permanent for this patient. For now, percutaneous access would be high risk given the patient's ongoing Eliquis therapy (would be 5 days prior to procedure) and already anemic and tachycardic state.    Thank you for the courtesy of this consult, please call [phone] with any further questions.

## 2020-07-13 NOTE — DIETITIAN INITIAL EVALUATION ADULT. - FEEDING SKILL
Pt always with small appetite, consuming ~50% of her meals. Not interested in liquid supplements, but agreeable to trial of jony pinto./independent

## 2020-07-13 NOTE — CONSULT NOTE ADULT - SUBJECTIVE AND OBJECTIVE BOX
RILEY MARIE [id number]  74y Female    HPI:  73 yo F with PMH of cervical cancer s/p radiation therapy (20 years ago), breast cancer (ER+ / LA+) s/p lumpectomy + radiation thereby (6 years ago), uterine cancer  s/p chemotherapy ( 3 years ago) as per patient, metastasis to bones,  COPD not on home oxygen, DM, HTN, osteoporosis, DVT on eliquis, Anemia of chronic disease, CKD 3 presents to ED with R sided abdominal pain and diarrhea. Pt reports gradual onset, episodic, 7/10 RLQ abdominal pain radiating to groin for past 2 days, with no aggravating or relieving factors. Pt reports 100lb weight loss in past 1 year due to malignant cancer. Pt also reports persistent non bloody, non mucoid, watery diarrhea for past 3 weeks, 4-5 episodes per day, not associated with food intake, and no recent travel or sick contact history. Denies any fever, cough, chest pain, SOB, n/v, recent travel or sick contacts.   In the ED, vitals were WNL,  Pt received 1L fluid bolus, CT A/P showed severe right hydronephrosis and dilatation of R ureter suggesting obstruction  due to encasement of right ureter. Urology was consulted, recommended no acute intervention at this time, pt refused the Catherine catheter placement. Pt will be admitted under medicine for further work up.     Colorectal surgery consulted for prior CT findings of abnormal connection between cecum and bladder, and fecaluria on insertion of catherine. Patient denies noticing any fecaluria, pneumaturia, or recent UTIs. No prior episodes of diverticulitis. She has never had a colonoscopy in the past.      PAST MEDICAL & SURGICAL HISTORY:  Osteoporosis  Obesity: bmi 41  COPD (chronic obstructive pulmonary disease)  Asthma: last attack 2018- hospitalized&amp; steroids  Diabetes  Hypertension  Breast cancer: left, radiation ~2 yrs ago  Cervical cancer: radiation&amp; chemo ~15 yrs ago  History of appendectomy  History of lumpectomy of left breast        MEDICATIONS  (STANDING):  chlorhexidine 4% Liquid 1 Application(s) Topical <User Schedule>  enalapril 10 milliGRAM(s) Oral daily  lactated ringers. 1000 milliLiter(s) (100 mL/Hr) IV Continuous <Continuous>  latanoprost 0.005% Ophthalmic Solution 1 Drop(s) Both EYES at bedtime  magnesium oxide 400 milliGRAM(s) Oral three times a day with meals  meropenem  IVPB 1000 milliGRAM(s) IV Intermittent every 8 hours  metoprolol succinate ER 25 milliGRAM(s) Oral daily  montelukast 10 milliGRAM(s) Oral daily  saccharomyces boulardii 250 milliGRAM(s) Oral two times a day  simvastatin 20 milliGRAM(s) Oral at bedtime  vancomycin  IVPB 1000 milliGRAM(s) IV Intermittent every 12 hours    MEDICATIONS  (PRN):  ALBUTerol    90 MICROgram(s) HFA Inhaler 2 Puff(s) Inhalation every 6 hours PRN Bronchospasm  diphenhydrAMINE 25 milliGRAM(s) Oral every 4 hours PRN Rash and/or Itching  morphine  - Injectable 2 milliGRAM(s) IV Push every 4 hours PRN Severe Pain (7 - 10)      Allergies    iodinated radiocontrast agents (Other)  penicillin (Unknown)    Intolerances        REVIEW OF SYSTEMS    [ ] A ten-point review of systems was otherwise negative except as noted.  [ ] Due to altered mental status/intubation, subjective information were not able to be obtained from the patient. History was obtained, to the extent possible, from review of the chart and collateral sources of information.      Vital Signs Last 24 Hrs  T(C): 37.5 (2020 15:49), Max: 37.5 (2020 15:49)  T(F): 99.5 (2020 15:49), Max: 99.5 (2020 15:49)  HR: 123 (2020 15:49) (66 - 123)  BP: 90/55 (2020 15:49) (90/55 - 109/51)  BP(mean): --  RR: 20 (2020 15:49) (17 - 20)  SpO2: 99% (2020 15:49) (93% - 99%)    PHYSICAL EXAM:  GENERAL: NAD, well-appearing  CHEST/LUNG: Clear to auscultation bilaterally  HEART: Regular rate and rhythm  ABDOMEN: Soft, Nontender, Nondistended  : +catherine, feculent output  EXTREMITIES:  No clubbing, cyanosis, or edema    LABS:  Labs:  CAPILLARY BLOOD GLUCOSE                            7.3    11.16 )-----------( 206      ( 2020 11:53 )             23.7       Auto Immature Granulocyte %: 1.2 % (20 @ 11:53)  Auto Neutrophil %: 88.1 % (20 @ 11:53)        136  |  105  |  15  ----------------------------<  68<L>  4.3   |  20  |  1.0      Calcium, Total Serum: 7.5 mg/dL (20 @ 11:53)      LFTs:             5.4  | 0.3  | 16       ------------------[110     ( 2020 11:53 )  2.1  | x    | 9           Lipase:x      Amylase:x         Lactate, Blood: 1.6 mmol/L (20 @ 15:55)      Coags:     17.20  ----< 1.50    ( 2020 18:08 )     33.2        Urinalysis Basic - ( 2020 21:20 )    Color: Yellow / Appearance: Turbid / S.020 / pH: x  Gluc: x / Ketone: Negative  / Bili: Negative / Urobili: <2 mg/dL   Blood: x / Protein: 30 mg/dL / Nitrite: Negative   Leuk Esterase: Large / RBC: 9 /HPF /  /HPF   Sq Epi: x / Non Sq Epi: 5 /HPF / Bacteria: Negative    Culture - Urine (collected 2020 21:20)  Source: .Urine Clean Catch (Midstream)  Final Report (2020 09:28):    >=3 organisms. Probable collection contamination.    RADIOLOGY & ADDITIONAL STUDIES:  < from: CT Abdomen and Pelvis No Cont (20 @ 17:50) >  IMPRESSION:  Since 2018;  Previously 11.7 cm pelvic mass now measuring 7 cm and containing air-fluid levels. Findings may reflect necrotic component versus postsurgical change.  Hepatic lesion and ultrasound suggested  Moderate ascites  .Severe right hydronephrosis and dilatation of the right ureter suggesting obstruction secondary to encasement of the right ureter by the pelvic mass.    Multiple small bowel loops containing air-fluid levels without distention or obvious point of obstruction.    Diffuse osseous sclerotic lesions throughout the bony pelvis and thoracolumbar spine suggesting metastatic bone disease.    < end of copied text >

## 2020-07-13 NOTE — PROGRESS NOTE ADULT - ASSESSMENT
73 yo F with PMH of Left Breast CA (radiation 2 years ago), Cervical CA (chemoradiation 15 years ago), COPD, DM, HTN, osteoporosis presents to ED with R sided abdominal pain and diarrhea x 3 weeks. Urology called for CT A/P showing severe right hydronephrosis and dilatation of R ureter suggesting obstruction 2* encasement of right ureter by pelvic mass (7cm).  Amin catheter with fecaluria  r/o colovesical fistula.     Plan:  Consider CT A/P with oral contrast r/o colovesical fistula   Cont. Amin catheter   Strict I&O  Gentle Irrigation of Amin catheter in no drainage   General surgery eval  Monitor BUN/Cr   Monitor H/H, transfuse prn   Will d/w Dr. Saucedo

## 2020-07-13 NOTE — DIETITIAN INITIAL EVALUATION ADULT. - NUTRITIONGOAL OUTCOME1
Pt to maintain wt of 1-2# within CBW & to consume >50% of meals, snacks, & supplements within 3 days

## 2020-07-13 NOTE — PROGRESS NOTE ADULT - SUBJECTIVE AND OBJECTIVE BOX
Subjective:   Pt. seen and examined at bedside in NAD, Amin catheter in place + fecaluria. Pt. denies B/L Flank pain, abdominal pain. Afebrile.     ROS:   [ x ] A 10 Point Review of Systems was negative except where noted  [   ] Due to altered mental status/intubation, subjective information was not able to be obtained from the patient. History was obtained to the extent possible from review of the chart and collateral sources of information.     ALBUTerol    90 MICROgram(s) HFA Inhaler 2 Puff(s) Inhalation every 6 hours PRN  chlorhexidine 4% Liquid 1 Application(s) Topical <User Schedule>  diphenhydrAMINE 25 milliGRAM(s) Oral every 4 hours PRN  enalapril 10 milliGRAM(s) Oral daily  lactated ringers. 1000 milliLiter(s) IV Continuous <Continuous>  latanoprost 0.005% Ophthalmic Solution 1 Drop(s) Both EYES at bedtime  metoprolol succinate ER 25 milliGRAM(s) Oral daily  montelukast 10 milliGRAM(s) Oral daily  morphine  - Injectable 2 milliGRAM(s) IV Push every 4 hours PRN  saccharomyces boulardii 250 milliGRAM(s) Oral two times a day  simvastatin 20 milliGRAM(s) Oral at bedtime        Vital Signs Last 24 Hrs  T(C): 37.1 (13 Jul 2020 08:00), Max: 37.4 (12 Jul 2020 19:30)  T(F): 98.7 (13 Jul 2020 08:00), Max: 99.4 (12 Jul 2020 19:30)  HR: 110 (13 Jul 2020 08:00) (66 - 110)  BP: 109/51 (13 Jul 2020 08:00) (97/54 - 109/51)  RR: 18 (13 Jul 2020 08:00) (17 - 18)  SpO2: 93% (13 Jul 2020 00:58) (93% - 100%)      PE  Constitutional: awake and alert in NAD, age appropriate development   HEENT: NC/AT, EOMI  Neck: no pain,   Back: No CVA tenderness B/L  Respiratory: No accessory respiratory muscle use  Abd: Soft, NT/ND     : + Amin catheter in place drains + fecaluria    Extremities: no edema  Neurological: A/O x 3  Psychiatric: Normal mood, normal affect  Skin: No rashes      LABS:                        7.3    11.16 )-----------( 206      ( 13 Jul 2020 11:53 )             23.7     07-13    136  |  105  |  15  ----------------------------<  68<L>  4.3   |  20  |  1.0    Ca    7.5<L>      13 Jul 2020 11:53  Mg     1.2     07-13    TPro  5.4<L>  /  Alb  2.1<L>  /  TBili  0.3  /  DBili  x   /  AST  16  /  ALT  9   /  AlkPhos  110  07-13     Urinalysis (07.11.20 @ 21:20)    Glucose Qualitative, Urine: Negative    Blood, Urine: Large    pH Urine: 6.0    Color: Yellow    Urine Appearance: Turbid    Bilirubin: Negative    Ketone - Urine: Negative    Specific Gravity: 1.020    Protein, Urine: 30 mg/dL    Urobilinogen: <2 mg/dL    Nitrite: Negative    Leukocyte Esterase Concentration: Large    Urine Microscopic-Add On (NC) (07.11.20 @ 21:20)    Red Blood Cell - Urine: 9 /HPF    White Blood Cell - Urine: 142 /HPF    Hyaline Casts: 6 /LPF    Bacteria: Negative    Epithelial Cells: 5 /HPF    Culture - Urine (07.11.20 @ 21:20)    Specimen Source: .Urine Clean Catch (Midstream)    Culture Results:   >=3 organisms. Probable collection contamination.    Radiology:    < from: CT Abdomen and Pelvis No Cont (07.11.20 @ 17:50) >  EXAM:  CT ABDOMEN AND PELVIS            PROCEDURE DATE:  07/11/2020            INTERPRETATION:  CLINICAL STATEMENT:    TECHNIQUE: Contiguous axial CT images were obtained from the lower chest to the pubic symphysis without intravenous contrast.  Oral contrast was not administered.  Reformatted images in the coronal and sagittal planes were acquired.    COMPARISON CT: CT abdomen and pelvis 1/22/2018    OTHER STUDIES USED FOR CORRELATION: None.       FINDINGS:    LOWER CHEST: Trace right and small left-sided pleural effusions. Coronary artery calcifications.    HEPATOBILIARY: 1.8 cm low density lesion lateral segment left hepatic lobe which appears new and further evaluation with ultrasound and/or contrast CT suggested    SPLEEN: Unremarkable.    PANCREAS: Unremarkable.    ADRENAL GLANDS: Unremarkable.    KIDNEYS: Severe right hydronephrosis and dilatation of the right ureter suggesting obstruction secondary to encasement of the right ureter by the pelvic mass.    ABDOMINOPELVIC NODES: Decreased left para-aortic predominantly fat 3 x 1.5 cm retroperitoneal structure, likely fatty lymph node.     PELVIC ORGANS: Intra-abdominal pelvic ascites obscures underlying pelvic organs. Decreased, previously 11.7 cm, pelvic mass now measuring 7 x3.2 x 6 cm and containing air-fluid levels.    PERITONEUM/MESENTERY/BOWEL: Multiple small bowel loops containing air-fluid levels without distention or obvious point of obstruction.     BONES/SOFT TISSUES: Diffuse osseous sclerotic lesions throughoutthe bony pelvis and thoracolumbar spine..    OTHER:    IMPRESSION:  Since January 22, 2018;  Previously 11.7 cm pelvic mass now measuring 7 cm and containing air-fluid levels. Findings may reflect necrotic component versus postsurgical change.  Hepatic lesion and ultrasound suggested  Moderate ascites  .Severe right hydronephrosis and dilatation of the right ureter suggesting obstruction secondary to encasement of the right ureter by the pelvic mass.    Multiple small bowel loops containing air-fluid levels without distention or obvious point of obstruction.    Diffuse osseous sclerotic lesions throughout the bony pelvis and thoracolumbar spine suggesting metastatic bone disease.          BONNIE TINEO M.D., RESIDENT RADIOLOGIST  This document has been electronically signed.  ASHISH MIJARES M.D., ATTENDING RADIOLOGIST  This document has been electronically signed. Jul 11 2020  6:33PM    < end of copied text >

## 2020-07-13 NOTE — CONSULT NOTE ADULT - ASSESSMENT
A/P:  75 yo F with PMH of cervical cancer s/p radiation therapy (20 years ago), breast cancer (ER+ / MS+) s/p lumpectomy + radiation thereby (6 years ago), uterine cancer  s/p chemotherapy ( 3 years ago) as per patient, metastasis to bones,  COPD not on home oxygen, DM, HTN, osteoporosis, Rt DVT, Anemia of chronic disease, CKD 3 presents to ED with R sided abdominal pain and diarrhea. Colorectal surgery consulted for evaluation of colovesical fistula.    Plan:  No acute surgical intervention  Recommend GI patient will need colonoscopy  Med Onc & GYN Onc evaluation for prognosis of Cancer  She is a poor surgical candidate, but will follow for possible need of diverting ostomy

## 2020-07-13 NOTE — PROGRESS NOTE ADULT - ASSESSMENT
#RLQ/suprapubic pain 2/2 right hydronephrosis 2/2 obstructive pelvic mass  -necrotic pelvic mass seen on imaging from [facility] with possible evidence of fistulization to the cecum (records in chart)  -catherine in place draining what appears to be fecal matter   -uro and gen surg eval for ?fistulization   -seen by IR no perc nephrostomy recommended   -cont pain control  -f/u bcx and urine culture   -maintain catherine     #leukocytosis   ?2/2 necrotic pelvic mass  send bcx   start empiric antibiotics (PCN allergy per pt when she was very young and she does not recall rxn)  ID consult   monitor fever curve  daily CBC    #anemia likely 2/2 iron deficiency vs. hematuria  cont iron supplementation  trend cbc transfuse if hgb <7  maintain active type and screen   monitor for overt bleeding    #cervical/uterine/breast cancer history  cont tamoxifen, megestrol  pt to follow with outpt onc on discharge      #hx of right LE DVT hold eliquis at this time given hematuria and drop in hgb     FULL code poor overall prognosis   Progress Note Handoff  Pending:  ID, gen surg, bcx, pain control, TOV, sepsis workup   Patient/Family discussion: POC discussed with pt she states she is aware of the pelvic mass and no biopsy has been done. she is concerned about this fecal discharge and is not aware of a fistula  Disposition: from home

## 2020-07-13 NOTE — PROGRESS NOTE ADULT - ASSESSMENT
# RLQ abdominal pain, secondary to Right sided hydronephrosis due to ureteral obstruction by pelvic mass  - CT A/P shows Previously 11.7 cm pelvic mass now measuring 7 cm and containing air-fluid levels. Findings may reflect necrotic component versus postsurgical change and severe right hydronephrosis and dilatation of the right ureter suggesting obstruction secondary to encasement of the right ureter by the pelvic mass.  - urology following  - pain control w/ morphine 2mg IV q4 prn  - IR following however for now holding off percutaneous intervention as patient is improving clinically and labs stable thus far  - f/u urine cx: specific organism not isolated  - Intake /Output monitoring    # Diarrhea  - clinically improving  - stool culture pending    # Anemia possibly DARSHAN secondary to hematuria  - trend CBC, keep active T&S, transfuse if Hb < 7  - iron studies pending  - start iron supplementation if DARSHAN    # Hx of cervical / uterine / and breast cancer  - on tamoxifen, and megestrol, carboplatin and paxel as per out patient charts    # Rt DVT  - hold Eliquis    # COPD: stable, c/w home meds  # HTN: c/w home metoprolol and enalapril  # DM? : f/u HbA1c  # Osteoporosis: c/w Alendronate  # CKD3: stable    # Diet: can start diet  # DVT Prophylaxis: hold Eliquis for now  # GI Prophylaxis:: Not indicated  # Activity: IAT    # Dispo: from home, lives alone, walks with a walker  # Code Status: full code

## 2020-07-13 NOTE — PROGRESS NOTE ADULT - SUBJECTIVE AND OBJECTIVE BOX
SUBJECTIVE:    Patient is a 74y old Female who presents with a chief complaint of abdominal pain (2020 08:26)    Stable, no acute events. Patient reports improved abdominal pain, feeling better. Tolerating PO diet.    PAST MEDICAL & SURGICAL HISTORY  Osteoporosis  Obesity: bmi 41  COPD (chronic obstructive pulmonary disease)  Asthma: last attack 2018- hospitalized&amp; steroids  Diabetes  Hypertension  Breast cancer: left, radiation ~2 yrs ago  Cervical cancer: radiation&amp; chemo ~15 yrs ago  History of appendectomy  History of lumpectomy of left breast       ALLERGIES:  iodinated radiocontrast agents (Other)  penicillin (Unknown)    MEDICATIONS:  STANDING MEDICATIONS  chlorhexidine 4% Liquid 1 Application(s) Topical <User Schedule>  enalapril 10 milliGRAM(s) Oral daily  lactated ringers. 1000 milliLiter(s) IV Continuous <Continuous>  latanoprost 0.005% Ophthalmic Solution 1 Drop(s) Both EYES at bedtime  metoprolol succinate ER 25 milliGRAM(s) Oral daily  montelukast 10 milliGRAM(s) Oral daily  saccharomyces boulardii 250 milliGRAM(s) Oral two times a day  simvastatin 20 milliGRAM(s) Oral at bedtime    PRN MEDICATIONS  ALBUTerol    90 MICROgram(s) HFA Inhaler 2 Puff(s) Inhalation every 6 hours PRN  diphenhydrAMINE 25 milliGRAM(s) Oral every 4 hours PRN  morphine  - Injectable 2 milliGRAM(s) IV Push every 4 hours PRN    VITALS:   T(F): 98.7  HR: 110  BP: 109/51  RR: 18  SpO2: 93%    LABS:                        7.5    10.89 )-----------( 293      ( 2020 18:08 )             24.4     07-12    137  |  105  |  15  ----------------------------<  100<H>  4.1   |  20  |  1.0    Ca    7.7<L>      2020 18:08    TPro  5.5<L>  /  Alb  2.2<L>  /  TBili  0.3  /  DBili  x   /  AST  12  /  ALT  9   /  AlkPhos  110  07-12    PT/INR - ( 2020 18:08 )   PT: 17.20 sec;   INR: 1.50 ratio         PTT - ( 2020 18:08 )  PTT:33.2 sec  Urinalysis Basic - ( 2020 21:20 )    Color: Yellow / Appearance: Turbid / S.020 / pH: x  Gluc: x / Ketone: Negative  / Bili: Negative / Urobili: <2 mg/dL   Blood: x / Protein: 30 mg/dL / Nitrite: Negative   Leuk Esterase: Large / RBC: 9 /HPF /  /HPF   Sq Epi: x / Non Sq Epi: 5 /HPF / Bacteria: Negative            Culture - Urine (collected 2020 21:20)  Source: .Urine Clean Catch (Midstream)  Final Report (2020 09:28):    >=3 organisms. Probable collection contamination.      CARDIAC MARKERS ( 2020 15:55 )  x     / <0.01 ng/mL / x     / x     / x              20 @ 07:01  -  20 @ 07:00  --------------------------------------------------------  IN: 0 mL / OUT: 1300 mL / NET: -1300 mL          PHYSICAL EXAM:  GEN: NAD, comfortable  LUNGS: CTAB, no w/r/r  HEART: RRR, no m/r/g  ABD: minimal tenderness to deep palpation  EXT: no edema  NEURO: AAOx3

## 2020-07-13 NOTE — DIETITIAN INITIAL EVALUATION ADULT. - ENERGY NEEDS
Calories: 2809-7682 kcal/day (MSJ x 1.2-1.5 AF)--increased needs d/t unintentional wt loss   Protein:  gm/day (1.2-1.5 gm/kg CBW)--same as above  Fluid: 1 ml/kcal Calories: 2803-3491 kcal/day (MSJ x 1.2-1.5 AF)--increased needs d/t unintentional wt loss + PI   Protein:  gm/day (1.2-1.5 gm/kg CBW)--same as above  Fluid: 1 ml/kcal

## 2020-07-13 NOTE — PROGRESS NOTE ADULT - SUBJECTIVE AND OBJECTIVE BOX
INTERVENTIONAL RADIOLOGY PROGRESS NOTE      74F w/pelvic mass, increased right hydronephrosis, abdominal pain.    Vitals: T(F): 99.1 (07-13-20 @ 00:58), Max: 99.4 (07-12-20 @ 19:30)  HR: 66 (07-13-20 @ 00:58) (66 - 115)  BP: 105/53 (07-13-20 @ 00:58) (71/38 - 105/56)  RR: 18 (07-13-20 @ 00:58) (17 - 18)  SpO2: 93% (07-13-20 @ 00:58) (93% - 100%)  Wt(kg): --    LABS:                        7.5    10.89 )-----------( 293      ( 12 Jul 2020 18:08 )             24.4     07-12    137  |  105  |  15  ----------------------------<  100<H>  4.1   |  20  |  1.0    Ca    7.7<L>      12 Jul 2020 18:08    TPro  5.5<L>  /  Alb  2.2<L>  /  TBili  0.3  /  DBili  x   /  AST  12  /  ALT  9   /  AlkPhos  110  07-12    PT/INR - ( 12 Jul 2020 18:08 )   PT: 17.20 sec;   INR: 1.50 ratio         PTT - ( 12 Jul 2020 18:08 )  PTT:33.2 sec      ASSESSMENT AND PLAN:  Mildly tachycardic, normotensive. Hgb 7.5. No acute renal injury, Cr 1.0. May consider palliative right PCN, 7/15 at earliest.   - Will continue following.   - Hold Eliquis.   - Transfuse PRN.    Please call Interventional Radiology [phone] with any questions, concerns, or issues regarding above. INTERVENTIONAL RADIOLOGY PROGRESS NOTE      74F w/pelvic mass, increased right hydronephrosis, abdominal pain.    Vitals: T(F): 99.1 (07-13-20 @ 00:58), Max: 99.4 (07-12-20 @ 19:30)  HR: 66 (07-13-20 @ 00:58) (66 - 115)  BP: 105/53 (07-13-20 @ 00:58) (71/38 - 105/56)  RR: 18 (07-13-20 @ 00:58) (17 - 18)  SpO2: 93% (07-13-20 @ 00:58) (93% - 100%)  Wt(kg): --    LABS:                        7.5    10.89 )-----------( 293      ( 12 Jul 2020 18:08 )             24.4     07-12    137  |  105  |  15  ----------------------------<  100<H>  4.1   |  20  |  1.0    Ca    7.7<L>      12 Jul 2020 18:08    TPro  5.5<L>  /  Alb  2.2<L>  /  TBili  0.3  /  DBili  x   /  AST  12  /  ALT  9   /  AlkPhos  110  07-12    PT/INR - ( 12 Jul 2020 18:08 )   PT: 17.20 sec;   INR: 1.50 ratio         PTT - ( 12 Jul 2020 18:08 )  PTT:33.2 sec      ASSESSMENT AND PLAN:  74F with extensive history of gynecologic cancers and multiple medical morbidities who presented with gradual onset radicular abdominal pain. CT demonstrates a residual 7 cm pelvic mass likely encasing the distal right ureter with increased right hydronephrosis, new ascites. Mildly tachycardic, normotensive. Hgb 7.5. No acute renopathy. Patient's symptoms are significantly improved.   - No intervention indicated at this time. Thank you for the courtesy of this consult.    Please call Interventional Radiology [phone] with any questions, concerns, or issues regarding above.

## 2020-07-13 NOTE — DIETITIAN INITIAL EVALUATION ADULT. - ADD RECOMMEND
MD Notification    Notified Person: MD Hutchison Vega    Notified Person Name:    Notification Date/Time: 1:11 AM    Notification Interaction:    Purpose of Notification: Na 128 and patient wants something for cough    Orders Received: stop iv fluids/robitussin    Comments:     1. Continue current diet order, 2. Order Prosource Gelatein SF BID

## 2020-07-13 NOTE — DIETITIAN INITIAL EVALUATION ADULT. - PHYSICAL APPEARANCE
alert and oriented. BMI: 23.2, IBW: 135#, UBW: ~140# as of recently, states that she weighed ~260# a year ago. no edema noted, unstageable pressure injury to coccyx. Upon NFPE, pt seems to display some mild muscle/fat loss, however seems to be age-related rather than malnutrition? Difficult to differentiate at this time, will f/u./well nourished

## 2020-07-13 NOTE — PROGRESS NOTE ADULT - SUBJECTIVE AND OBJECTIVE BOX
CYNTHIARILEY  74y, Female  Allergy: iodinated radiocontrast agents (Other)  penicillin (Unknown)  Hospital Day: 2d      Patient was seen and examined at bedside. states her pain has improved. states she has known about this pelvic mass for some time but the fecal matter when she urinates is something new no fevers, chills, cp, sob, n/v/c/d        VITALS:  T(F): 99.5 (20 @ 15:49), Max: 99.5 (20 @ 15:49)  HR: 123 (20 @ 15:49)  BP: 90/55 (20 @ 15:49) (90/55 - 109/51)  RR: 20 (20 @ 15:49)  SpO2: 99% (20 @ 15:49)    PHYSICAL EXAM:  GENERAL: well developed well nourished in no acute distress  NECK: No evidence of swelling no palpable lymphadenopathy  CHEST/LUNG: clear to ausculation bilaterally no wheezes, rales, or rhonchi   HEART/VASC: RRR, No murmurs, rubs, or gallops appreciated, 2+ equal bilateral radial pulse  ABDOMEN: Soft, tender suprapubic region non distended +bowel sounds in all quadrants, no palpable organomegaly   EXTREMITIES:  No clubbing and range of motion intact   : catherine in place draining ?fecal matter     TESTS & MEASUREMENTS:  Weight (Kg): 67.1 (20 @ 19:30)  BMI (kg/m2): 23.2 ()    20 @ 07:01  -  20 @ 07:00  --------------------------------------------------------  IN: 0 mL / OUT: 1300 mL / NET: -1300 mL    20 @ 07:01  -  20 @ 17:03  --------------------------------------------------------  IN: 0 mL / OUT: 250 mL / NET: -250 mL                            7.3    11.16 )-----------( 206      ( 2020 11:53 )             23.7     PT/INR - ( 2020 18:08 )   PT: 17.20 sec;   INR: 1.50 ratio         PTT - ( 2020 18:08 )  PTT:33.2 sec      136  |  105  |  15  ----------------------------<  68<L>  4.3   |  20  |  1.0    Ca    7.5<L>      2020 11:53  Mg     1.2         TPro  5.4<L>  /  Alb  2.1<L>  /  TBili  0.3  /  DBili  x   /  AST  16  /  ALT  9   /  AlkPhos  110  13    LIVER FUNCTIONS - ( 2020 11:53 )  Alb: 2.1 g/dL / Pro: 5.4 g/dL / ALK PHOS: 110 U/L / ALT: 9 U/L / AST: 16 U/L / GGT: x                 Culture - Urine (collected 20 @ 21:20)  Source: .Urine Clean Catch (Midstream)  Final Report (20 @ 09:28):    >=3 organisms. Probable collection contamination.      Urinalysis Basic - ( 2020 21:20 )    Color: Yellow / Appearance: Turbid / S.020 / pH: x  Gluc: x / Ketone: Negative  / Bili: Negative / Urobili: <2 mg/dL   Blood: x / Protein: 30 mg/dL / Nitrite: Negative   Leuk Esterase: Large / RBC: 9 /HPF /  /HPF   Sq Epi: x / Non Sq Epi: 5 /HPF / Bacteria: Negative        MEDICATIONS:  MEDICATIONS  (STANDING):  chlorhexidine 4% Liquid 1 Application(s) Topical <User Schedule>  enalapril 10 milliGRAM(s) Oral daily  lactated ringers. 1000 milliLiter(s) (100 mL/Hr) IV Continuous <Continuous>  latanoprost 0.005% Ophthalmic Solution 1 Drop(s) Both EYES at bedtime  magnesium oxide 400 milliGRAM(s) Oral three times a day with meals  meropenem  IVPB 1000 milliGRAM(s) IV Intermittent every 8 hours  metoprolol succinate ER 25 milliGRAM(s) Oral daily  montelukast 10 milliGRAM(s) Oral daily  saccharomyces boulardii 250 milliGRAM(s) Oral two times a day  simvastatin 20 milliGRAM(s) Oral at bedtime  vancomycin  IVPB 1000 milliGRAM(s) IV Intermittent every 12 hours    MEDICATIONS  (PRN):  ALBUTerol    90 MICROgram(s) HFA Inhaler 2 Puff(s) Inhalation every 6 hours PRN Bronchospasm  diphenhydrAMINE 25 milliGRAM(s) Oral every 4 hours PRN Rash and/or Itching  morphine  - Injectable 2 milliGRAM(s) IV Push every 4 hours PRN Severe Pain (7 - 10)

## 2020-07-13 NOTE — DIETITIAN INITIAL EVALUATION ADULT. - OTHER INFO
Nutrition Hx PTA: Pt always w/ small appetite, picks and nibbles throughout the day, but tries to consume 2 meals daily. Has tried Ensure supplements in the past and doesn't like them. Pt with no cultural/Lutheran restrictions and has NKFA. Pt follows diabetic diet at home. Pt states that she's lost over 120# in the past year 2/2 cancer, but no drastic changes in appetite/po intake.     Pt p/w RLQ abdominal pain, secondary to right sided hydronephrosis due to ureteral obstruction by pelvic mass. IR following however for now holding off percutaneous intervention as patient is improving clinically and labs stable thus far. F/u urine cx: specific organism not isolated. Diarrhea clinically improving; stool culture pending.

## 2020-07-13 NOTE — CONSULT NOTE ADULT - ATTENDING COMMENTS
74F with PMH of cervical cancer s/p radiation therapy (20 years ago), breast cancer s/p lumpectomy/radiation (6 years ago), uterine cancer  s/p chemotherapy ( 3 years ago) with metastatic disease, COPD, DM, HTN, osteoporosis, Rt DVT, Anemia, CKD 3 presents to ED with R sided abdominal pain and diarrhea.  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  No previous colonoscopy.    Patient seen and examined.  Patient denies fecaluria or pneumaturia previously. States she is tolerating a diet and had diarrhea today.    Abdomen - soft, non distended, large pannus, non tender    labs and images reviewed    Plan:  - Given the patient's cancer burden and advanced disease, she is a very poor surgical candidate.  Recommend goals of care discussion.  If patient is amenable, we could offer a palliative diverting loop ileostomy although it would be high risk.  - GI consult for possible colonoscopic evaluation  - GYN Onc/ Med Onc evaluation  - colorectal surgery to follow 74F with PMH of cervical cancer s/p radiation therapy (20 years ago), breast cancer s/p lumpectomy/radiation (6 years ago), uterine cancer  s/p chemotherapy ( 3 years ago) with metastatic disease, COPD, DM, HTN, osteoporosis, Rt DVT, Anemia, CKD 3 presents to ED with R sided abdominal pain and diarrhea.  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  No previous colonoscopy.    Patient seen and examined.  Patient denies fecaluria or pneumaturia previously. States she is tolerating a diet and had diarrhea today.    Abdomen - soft, non distended, large pannus, non tender    labs and images reviewed    Plan:  - Given the patient's cancer burden and advanced disease, she is a very poor surgical candidate.  Recommend goals of care discussion.  If patient is amenable, we could offer a palliative diverting loop ileostomy although it would be high risk.  - IV abx  - GI consult for possible colonoscopic evaluation  - GYN Onc/ Med Onc evaluation  - colorectal surgery to follow

## 2020-07-14 NOTE — ADVANCED PRACTICE NURSE CONSULT - RECOMMEDATIONS
1. IAD b/l buttock-intergluteal cleft- Cleanse wound bed w/ normal saline, gently pat dry.  Apply thin layer of Coloplast Triad hydrophilic ointment q12h & prn after each incontinence episode  to provide protective layer & absorb any wound exudate.  If top layer of Triad soiled, gently remove w/ perineal cleanser and re-apply ointment. Do not scrub off as this may cause further skin breakdown.  -Assess skin/wound q shift, report changes to appropriate provider.     Additional recs/PI Prevention:   -Continue to instruct/encourage & assist patient as needed w/ turning/positioning  from side-to-side q2h while in bed, q1h when/if OOB chair, or in accordance w/ pt's plan of care. Utilize pillows as needed to assist w/ turning/positioning. When/if OOB chair, utilize pillows or chair cushion to offload pressure.   -While in bed, encourage patient to offload heels from bed surface with soft pillow under calfs.  -Continue utilizing one underpad underneath patient to contain incontinence episodes; change pad when saturated/soiled.   -When/if catherine d/c'ed & pt incontinent, consider utilizing female incontinence device/PrimaFit (if patient candidate) to contain urinary incontinence.  -Consider utilizing fecal management system/rectal tube/DigniShield (if patient candidate; MD order required) to contain loose fecal incontinence.     Plan of Care: Spoke w/ primary RN Vidhi in regards to above recs. Spoke w/ covering/primary MD Tyson (at 0130) in regards to above. Signing off on patient, no further needs/recs from [facility] service at this time. Staff RN to perform routine skin/wound assessment and manage wound care. Questions or concerns or if wound worsens and reconsult needed, please contact [facility], [phone].

## 2020-07-14 NOTE — PROGRESS NOTE ADULT - SUBJECTIVE AND OBJECTIVE BOX
Hospital Day:  3d    Patient is a 74y old  Female who presents with a chief complaint of 3d    Subjective:  pt seen and examined at bedside. Reports improvement in her general status. No pain.   She's still deciding whether she wants intervention or not.     Past Medical Hx:   Osteoporosis  Obesity  COPD (chronic obstructive pulmonary disease)  Asthma  Diabetes  Hypertension  Breast cancer  Cervical cancer    Past Sx:  History of appendectomy  History of lumpectomy of left breast    Allergies:  iodinated radiocontrast agents (Other)  penicillin (Unknown)    Current Meds:   Standng Meds:  cefepime   IVPB      cefepime   IVPB 2000 milliGRAM(s) IV Intermittent once  chlorhexidine 4% Liquid 1 Application(s) Topical <User Schedule>  lactated ringers. 1000 milliLiter(s) (100 mL/Hr) IV Continuous <Continuous>  latanoprost 0.005% Ophthalmic Solution 1 Drop(s) Both EYES at bedtime  magnesium oxide 400 milliGRAM(s) Oral three times a day with meals  metoprolol succinate ER 25 milliGRAM(s) Oral daily  montelukast 10 milliGRAM(s) Oral daily  saccharomyces boulardii 250 milliGRAM(s) Oral two times a day  simvastatin 20 milliGRAM(s) Oral at bedtime  vancomycin  IVPB 1000 milliGRAM(s) IV Intermittent every 12 hours    PRN Meds:  ALBUTerol    90 MICROgram(s) HFA Inhaler 2 Puff(s) Inhalation every 6 hours PRN Bronchospasm  diphenhydrAMINE 25 milliGRAM(s) Oral every 4 hours PRN Rash and/or Itching  morphine  - Injectable 2 milliGRAM(s) IV Push every 4 hours PRN Severe Pain (7 - 10)    HOME MEDICATIONS:  Advair Diskus 100 mcg-50 mcg inhalation powder: 1 puff(s) inhaled 2 times a day  albuterol 90 mcg/inh inhalation aerosol: 2 puff(s) inhaled 4 times a day, As Needed  alendronate weekly: 70 milligram(s) orally once a week (wednesday)  aspirin 81 mg oral tablet: 1 tab(s) orally once a day  Eliquis 5 mg oral tablet: 1 tab(s) orally 2 times a day  enalapril 10 mg oral tablet: 1 tab(s) orally once a day  latanoprost 0.005% ophthalmic solution: 1 drop(s) to each affected eye once a day (in the evening)  megestrol 20 mg oral tablet: 1 tab(s) orally 4 times a day  metoprolol succinate 25 mg oral tablet, extended release: 1 tab(s) orally once a day  montelukast 10 mg oral tablet: 1 tab(s) orally once a day  simvastatin 20 mg oral tablet: 1 tab(s) orally once a day (at bedtime)  tamoxifen 20 mg oral tablet: 1 tab(s) orally once a day      Vital Signs:   T(F): 97.6 (20 @ 07:35), Max: 99.5 (20 @ 15:49)  HR: 64 (20 @ 07:35) (64 - 123)  BP: 98/49 (20 @ 07:35) (85/50 - 98/49)  RR: 18 (20 @ 07:35) (18 - 20)  SpO2: 99% (20 @ 15:49) (99% - 99%)      20 @ 07:01  -  20 @ 07:00  --------------------------------------------------------  IN: 1350 mL / OUT: 250 mL / NET: 1100 mL        Physical Exam:   GENERAL: older woman, lying in bed comfortably not in distress.   HEENT: NCAT  CHEST/LUNG: CTAB  HEART: Regular rate and rhythm; s1 s2 appreciated, No murmurs, rubs, or gallops  ABDOMEN: Soft, Nontender, Nondistended; Bowel sounds present  EXTREMITIES: No LE edema b/l  SKIN: no rashes, no new lesions  NERVOUS SYSTEM:  Alert & Oriented X3  LINES/CATHETERS: Catherine : showing fecaluria.         Labs:                         8.1    11.70 )-----------( 252      ( 2020 11:31 )             26.9     Neutophil% 88.6, Lymphocyte% 5.7, Monocyte% 4.1, Bands% 0.5 20 @ 11:31    2020 11:31    138    |  104    |  14     ----------------------------<  92     4.2     |  18     |  1.0      Ca    7.6        2020 11:31  Mg     1.2       2020 11:31    TPro  5.7    /  Alb  2.2    /  TBili  0.4    /  DBili  x      /  AST  13     /  ALT  9      /  AlkPhos  122    2020 11:31        Amylase --, Lipase 62, 20 @ 15:55    Troponin <0.01, CKMB --, CK -- 20 @ 15:55    Iron 25, TIBC 102, %Sat 25 Ferritin 844 20 @ 18:08      Urinalysis Basic - ( 2020 21:20 )    Color: Yellow / Appearance: Turbid / S.020 / pH: x  Gluc: x / Ketone: Negative  / Bili: Negative / Urobili: <2 mg/dL   Blood: x / Protein: 30 mg/dL / Nitrite: Negative   Leuk Esterase: Large / RBC: 9 /HPF /  /HPF   Sq Epi: x / Non Sq Epi: 5 /HPF / Bacteria: Negative    Radiology:     < from: CT Abdomen and Pelvis No Cont (20 @ 17:50) >  MPRESSION:  Since 2018;  Previously 11.7 cm pelvic mass now measuring 7 cm and containing air-fluid levels. Findings may reflect necrotic component versus postsurgical change.  Hepatic lesion and ultrasound suggested  Moderate ascites  .Severe right hydronephrosis and dilatation of the right ureter suggesting obstruction secondary to encasement of the right ureter by the pelvic mass.    Multiple small bowel loops containing air-fluid levels without distention or obvious point of obstruction.    Diffuse osseous sclerotic lesions throughout the bony pelvis and thoracolumbar spine suggesting metastatic bone dise    < end of copied text >    Assessment and Plan:   · Assessment		  # RLQ abdominal pain, secondary to Right sided hydronephrosis due to ureteral obstruction by pelvic mass  - CT A/P shows Previously 11.7 cm pelvic mass now measuring 7 cm and containing air-fluid levels. Findings may reflect necrotic component versus postsurgical change and severe right hydronephrosis and dilatation of the right ureter suggesting obstruction secondary to encasement of the right ureter by the pelvic mass.  - urology following,-uro and gen surg eval noted   - pain control w/ morphine 2mg IV q4 prn  - IR following however for now holding off percutaneous intervention as patient is improving clinically and labs stable thus far  - f/u urine cx: specific organism not isolated  -necrotic pelvic mass seen on imaging from [facility] with possible evidence of fistulization to the cecum (records in chart)  -catherine in place draining what appears to be fecal matter   -f/u bcx and urine culture   -maintain catherine at this time  -pt cannot have contrast studies given iodine allergy she states when she had contrast in the past she needed to be resuscitated given her adverse rxn   -may require palliative consult to further speak with pt given her overall prognosis  - Will attempt to contact Her oncologist.          # Diarrhea  - clinically improving  - stool culture pending    # Anemia possibly DARSHAN secondary to hematuria  - trend CBC, keep active T&S, transfuse if Hb < 7  - iron studies pending  - start iron supplementation if DARSHAN    # Hx of cervical / uterine / and breast cancer  - on tamoxifen, and megestrol, carboplatin and paxel as per out patient charts    # Rt DVT  - hold Eliquis    # COPD: stable, c/w home meds  # HTN: c/w home metoprolol and enalapril  # DM? : f/u HbA1c  # Osteoporosis: c/w Alendronate  # CKD3: stable    # Diet: can start diet  # DVT Prophylaxis: hold Eliquis for now  # GI Prophylaxis:: Not indicated  # Activity: IAT    # Dispo: from home, lives alone, walks with a walker  # Code Status: full code

## 2020-07-14 NOTE — PROGRESS NOTE ADULT - SUBJECTIVE AND OBJECTIVE BOX
Progress Note: General Surgery  Patient: RILEY MARIE , 74y (1945)Female   MRN: [id number]  Location: [address]  Visit: 07-11-20 Inpatient  Date: 07-14-20 @ 05:26    Procedure/Diagnosis: Colovesicular fistula     Events/ 24h: No acute events overnight. Pain controlled with morphine. On meropenem and vanc regimen.    Vitals: T(F): 99.2 (07-14-20 @ 00:00), Max: 99.5 (07-13-20 @ 15:49)  HR: 95 (07-14-20 @ 01:00)  BP: 85/50 (07-14-20 @ 01:00) (85/50 - 109/51)  RR: 18 (07-14-20 @ 00:00)  SpO2: 99% (07-13-20 @ 15:49)    In:   07-12-20 @ 07:01  -  07-13-20 @ 07:00  --------------------------------------------------------  IN: 0 mL    07-13-20 @ 07:01  -  07-14-20 @ 05:26  --------------------------------------------------------  IN: 1100 mL      Out:   07-12-20 @ 07:01  -  07-13-20 @ 07:00  --------------------------------------------------------  OUT:    Voided: 1300 mL  Total OUT: 1300 mL      07-13-20 @ 07:01  -  07-14-20 @ 05:26  --------------------------------------------------------  OUT:    Indwelling Catheter - Urethral: 250 mL  Total OUT: 250 mL        Net:   07-12-20 @ 07:01  -  07-13-20 @ 07:00  --------------------------------------------------------  NET: -1300 mL    07-13-20 @ 07:01  -  07-14-20 @ 05:26  --------------------------------------------------------  NET: 850 mL        Diet: Diet, Consistent Carbohydrate/No Snacks:   DASH/TLC Sodium & Cholesterol Restricted  Prosource Gelatein 20 Sugar Free     Qty per Day:  2 (07-13-20 @ 13:18)    IV Fluids: lactated ringers. 1000 milliLiter(s) (100 mL/Hr) IV Continuous <Continuous>  magnesium oxide 400 milliGRAM(s) Oral three times a day with meals      Physical Examination:  General Appearance: NAD  HEENT: EOMI, sclera non-icteric.  Abdomen:  Soft, nontender, nondistended.   MSK/Extremities: Warm & well-perfused.   Skin: Warm, dry. No jaundice.       Medications: [Standing]  chlorhexidine 4% Liquid 1 Application(s) Topical <User Schedule>  enalapril 10 milliGRAM(s) Oral daily  lactated ringers. 1000 milliLiter(s) (100 mL/Hr) IV Continuous <Continuous>  latanoprost 0.005% Ophthalmic Solution 1 Drop(s) Both EYES at bedtime  magnesium oxide 400 milliGRAM(s) Oral three times a day with meals  meropenem  IVPB 1000 milliGRAM(s) IV Intermittent every 8 hours  metoprolol succinate ER 25 milliGRAM(s) Oral daily  montelukast 10 milliGRAM(s) Oral daily  saccharomyces boulardii 250 milliGRAM(s) Oral two times a day  simvastatin 20 milliGRAM(s) Oral at bedtime  vancomycin  IVPB 1000 milliGRAM(s) IV Intermittent every 12 hours    DVT Prophylaxis:   GI Prophylaxis:   Antibiotics: meropenem  IVPB 1000 milliGRAM(s) IV Intermittent every 8 hours  vancomycin  IVPB 1000 milliGRAM(s) IV Intermittent every 12 hours    Anticoagulation:   Medications:[PRN]  ALBUTerol    90 MICROgram(s) HFA Inhaler 2 Puff(s) Inhalation every 6 hours PRN  diphenhydrAMINE 25 milliGRAM(s) Oral every 4 hours PRN  morphine  - Injectable 2 milliGRAM(s) IV Push every 4 hours PRN      Labs:                        7.3    11.16 )-----------( 206      ( 13 Jul 2020 11:53 )             23.7     07-13    136  |  105  |  15  ----------------------------<  68<L>  4.3   |  20  |  1.0    Ca    7.5<L>      13 Jul 2020 11:53  Mg     1.2     07-13    TPro  5.4<L>  /  Alb  2.1<L>  /  TBili  0.3  /  DBili  x   /  AST  16  /  ALT  9   /  AlkPhos  110  07-13    LIVER FUNCTIONS - ( 13 Jul 2020 11:53 )  Alb: 2.1 g/dL / Pro: 5.4 g/dL / ALK PHOS: 110 U/L / ALT: 9 U/L / AST: 16 U/L / GGT: x           PT/INR - ( 12 Jul 2020 18:08 )   PT: 17.20 sec;   INR: 1.50 ratio         PTT - ( 12 Jul 2020 18:08 )  PTT:33.2 sec        Urine/Micro:    Culture - Urine (collected 11 Jul 2020 21:20)  Source: .Urine Clean Catch (Midstream)  Final Report (13 Jul 2020 09:28):    >=3 organisms. Probable collection contamination.          Imaging:     Assessment:  74y Female patient admitted for colovesicular fistula. Patient being seen by nephrology for R ureter obstruction, with nothing to do on their end. Patient is poor surgical candidate, but will follow for need of diverting ostomy.    Patient seen & examined at bedside. In NAD & has no complaints.     Plan:  - Continue Pain control  - Continue antibiotics  - OOB as tolerated  - Continue Incentive Spirometry  - f/u vitals  - f/u labs & replete as necessary  - DVT PPX  - GI PPX  - Speak w/ SW/CM      Date/Time: 07-14-20 @ 05:26

## 2020-07-14 NOTE — ADVANCED PRACTICE NURSE CONSULT - ASSESSMENT
73 yo F with PMH of cervical cancer s/p radiation therapy (20 years ago), breast cancer (ER+ / IN+) s/p lumpectomy + radiation thereby (6 years ago), uterine cancer  s/p chemotherapy ( 3 years ago) as per patient, metastasis to bones,  COPD not on home oxygen, DM, HTN, osteoporosis, DVT on eliquis, Anemia of chronic disease, CKD 3 presents to ED with R sided abdominal pain and diarrhea.     Received patient on 4B, laying supine in bed, turned to left side, HOB elevated 30 degrees. Pt awake, A&Ox3, made aware of purpose of WOCN visit, agreeable to consult. Patient able to turn herself completely to left side for skin assessment.     Incontinence assocated dermatitis (IAD) w/ partial thickness ulcerations/skin loss to b/l buttock & intergluteal cleft area-open areas moist w/ dark pink tissue, edges attached/irregular, no drainage, odor, induration erythema or warmth present. Pt reports tenderness to area.     Patient report she is currently bedbound, able to turn/position in bed, + catherine, incontinent of consistent loose stool/diarrhea.  Ordered for sodium & cholesterol restricted diet, adequate intake as per reported Taz score.

## 2020-07-14 NOTE — PROGRESS NOTE ADULT - SUBJECTIVE AND OBJECTIVE BOX
CYNTHIARILEY  74y, Female  Allergy: iodinated radiocontrast agents (Other)  penicillin (Unknown)  Hospital Day: 3d      Patient was seen and examined at bedside. states her pain has resolved. she is unsure regarding surgical intervention for the fistula and wishes to wait and speak with surgery later today       VITALS:  T(F): 97.6 (07-14-20 @ 07:35), Max: 99.5 (07-13-20 @ 15:49)  HR: 64 (07-14-20 @ 07:35)  BP: 98/49 (07-14-20 @ 07:35) (85/50 - 98/49)  RR: 18 (07-14-20 @ 07:35)  SpO2: 99% (07-13-20 @ 15:49)    PHYSICAL EXAM:  GENERAL: well developed well nourished in no acute distress  NECK: No evidence of swelling no palpable lymphadenopathy  CHEST/LUNG: clear to ausculation bilaterally no wheezes, rales, or rhonchi   HEART/VASC: RRR, No murmurs, rubs, or gallops appreciated, 2+ equal bilateral radial pulse  ABDOMEN: Soft, non tender non distended +bowel sounds in all quadrants, no palpable organomegaly   EXTREMITIES:  No clubbing and range of motion intact   : catherine draining fecal matter     TESTS & MEASUREMENTS:  Weight (Kg): 67.1 (07-12-20 @ 19:30)  BMI (kg/m2): 23.2 (07-13)    07-12-20 @ 07:01  -  07-13-20 @ 07:00  --------------------------------------------------------  IN: 0 mL / OUT: 1300 mL / NET: -1300 mL    07-13-20 @ 07:01  -  07-14-20 @ 07:00  --------------------------------------------------------  IN: 1350 mL / OUT: 250 mL / NET: 1100 mL                            8.1    11.70 )-----------( 252      ( 14 Jul 2020 11:31 )             26.9     PT/INR - ( 12 Jul 2020 18:08 )   PT: 17.20 sec;   INR: 1.50 ratio         PTT - ( 12 Jul 2020 18:08 )  PTT:33.2 sec  07-13    136  |  105  |  15  ----------------------------<  68<L>  4.3   |  20  |  1.0    Ca    7.5<L>      13 Jul 2020 11:53  Mg     1.2     07-13    TPro  5.4<L>  /  Alb  2.1<L>  /  TBili  0.3  /  DBili  x   /  AST  16  /  ALT  9   /  AlkPhos  110  07-13    LIVER FUNCTIONS - ( 13 Jul 2020 11:53 )  Alb: 2.1 g/dL / Pro: 5.4 g/dL / ALK PHOS: 110 U/L / ALT: 9 U/L / AST: 16 U/L / GGT: x                 Culture - Stool (collected 07-12-20 @ 19:25)  Source: .Stool Feces  Preliminary Report (07-14-20 @ 10:36):    No enteric pathogens to date: Final culture pending    Culture - Urine (collected 07-11-20 @ 21:20)  Source: .Urine Clean Catch (Midstream)  Final Report (07-13-20 @ 09:28):    >=3 organisms. Probable collection contamination.        MEDICATIONS:  MEDICATIONS  (STANDING):  cefepime   IVPB      chlorhexidine 4% Liquid 1 Application(s) Topical <User Schedule>  lactated ringers. 1000 milliLiter(s) (100 mL/Hr) IV Continuous <Continuous>  latanoprost 0.005% Ophthalmic Solution 1 Drop(s) Both EYES at bedtime  magnesium oxide 400 milliGRAM(s) Oral three times a day with meals  metoprolol succinate ER 25 milliGRAM(s) Oral daily  montelukast 10 milliGRAM(s) Oral daily  saccharomyces boulardii 250 milliGRAM(s) Oral two times a day  simvastatin 20 milliGRAM(s) Oral at bedtime  vancomycin  IVPB 1000 milliGRAM(s) IV Intermittent every 12 hours    MEDICATIONS  (PRN):  ALBUTerol    90 MICROgram(s) HFA Inhaler 2 Puff(s) Inhalation every 6 hours PRN Bronchospasm  diphenhydrAMINE 25 milliGRAM(s) Oral every 4 hours PRN Rash and/or Itching  morphine  - Injectable 2 milliGRAM(s) IV Push every 4 hours PRN Severe Pain (7 - 10)

## 2020-07-14 NOTE — PROGRESS NOTE ADULT - ASSESSMENT
#RLQ/suprapubic pain 2/2 right hydronephrosis 2/2 obstructive pelvic mass  -necrotic pelvic mass seen on imaging from [facility] with possible evidence of fistulization to the cecum (records in chart)  -catherine in place draining what appears to be fecal matter   -uro and gen surg eval noted   -seen by IR no perc nephrostomy recommended   -cont pain control  -f/u bcx and urine culture   -maintain catherine at this time  -pt cannot have contrast studies given iodine allergy she states when she had contrast in the past she needed to be resuscitated given her adverse rxn   -may require palliative consult to further speak with pt given her overall prognosis     #leukocytosis   ?2/2 necrotic pelvic mass  send bcx   start empiric antibiotics (PCN allergy per pt when she was very young and she does not recall rxn)  ID consult-->change to cefepime 2g q12h and repeat urine culture   monitor fever curve  daily CBC    #anemia likely 2/2 iron deficiency vs. hematuria  cont iron supplementation  trend cbc transfuse if hgb <7  maintain active type and screen   monitor for overt bleeding    #cervical/uterine/breast cancer history  cont tamoxifen, megestrol  pt to follow with outpt onc on discharge      #hx of right LE DVT hold eliquis at this time given hematuria and drop in hgb     FULL code poor overall prognosis   Progress Note Handoff  Pending:  antibiotics,  gen surg, bcx, pain control, TOV,   Patient/Family discussion: POC discussed with pt. she gets easily upset when team attempted to explain to her the options for intervention. she states she wants to wait until her oncologist speaks with surgery before giving medical team a plan. she understands it is a  high risk procedure and understands that if she decides to forgo the chance of her getting recurrent UTIs and becoming septic is also high. she states she wants time to think  Disposition: from home

## 2020-07-14 NOTE — CONSULT NOTE ADULT - SUBJECTIVE AND OBJECTIVE BOX
RILEY MARIE  74y, Female  Allergy: iodinated radiocontrast agents (Other)  penicillin (Unknown)      All historical available data reviewed.    HPI:  73 yo F with PMH of cervical cancer s/p radiation therapy (20 years ago), breast cancer (ER+ / MS+) s/p lumpectomy + radiation thereby (6 years ago), uterine cancer  s/p chemotherapy ( 3 years ago) as per patient, metastasis to bones,  COPD not on home oxygen, DM, HTN, osteoporosis, DVT on eliquis, Anemia of chronic disease, CKD 3 presents to ED with R sided abdominal pain and diarrhea.   Pt reports gradual onset, episodic, 7/10 RLQ abdominal pain radiating to groin for past 2 days, with no aggravating or relieving factors. Pt reports 100lb weight loss in past 1 year due to malignant cancer.   Pt also reports persistent non bloody, non mucoid, watery diarrhea for past 3 weeks, 4-5 episodes per day, not associated with food intake, and no recent travel or sick contact history. Pt has not passed any stool today.   Denies any fever, cough, chest pain, SOB, n/v, recent travel or sick contacts.     In the ED, vitals were WNL,  Pt received 1L fluid bolus, CT A/P showed severe right hydronephrosis and dilatation of R ureter suggesting obstruction  due to encasement of right ureter. Urology was consulted, recommended no acute intervention at this time, pt refused the Amin catheter placement. Pt will be admitted under medicine for further work up. (12 Jul 2020 01:03)    ID called for possible infectious etiology of necrotic abdominal mass    FAMILY HISTORY:  Family history of breast cancer (Mother, Sibling): mother&amp; sister    PAST MEDICAL & SURGICAL HISTORY:  Osteoporosis  Obesity: bmi 41  COPD (chronic obstructive pulmonary disease)  Asthma: last attack 1/2018- hospitalized&amp; steroids  Diabetes  Hypertension  Breast cancer: left, radiation ~2 yrs ago  Cervical cancer: radiation&amp; chemo ~15 yrs ago  History of appendectomy  History of lumpectomy of left breast        VITALS:  T(F): 97.6, Max: 99.5 (07-13-20 @ 15:49)  HR: 64  BP: 98/49  RR: 18Vital Signs Last 24 Hrs  T(C): 36.4 (14 Jul 2020 07:35), Max: 37.5 (13 Jul 2020 15:49)  T(F): 97.6 (14 Jul 2020 07:35), Max: 99.5 (13 Jul 2020 15:49)  HR: 64 (14 Jul 2020 07:35) (64 - 123)  BP: 98/49 (14 Jul 2020 07:35) (85/50 - 98/49)  BP(mean): --  RR: 18 (14 Jul 2020 07:35) (18 - 20)  SpO2: 99% (13 Jul 2020 15:49) (99% - 99%)    TESTS & MEASUREMENTS:                        7.3    11.16 )-----------( 206      ( 13 Jul 2020 11:53 )             23.7     07-13    136  |  105  |  15  ----------------------------<  68<L>  4.3   |  20  |  1.0    Ca    7.5<L>      13 Jul 2020 11:53  Mg     1.2     07-13    TPro  5.4<L>  /  Alb  2.1<L>  /  TBili  0.3  /  DBili  x   /  AST  16  /  ALT  9   /  AlkPhos  110  07-13    LIVER FUNCTIONS - ( 13 Jul 2020 11:53 )  Alb: 2.1 g/dL / Pro: 5.4 g/dL / ALK PHOS: 110 U/L / ALT: 9 U/L / AST: 16 U/L / GGT: x             Culture - Stool (collected 07-12-20 @ 19:25)  Source: .Stool Feces  Preliminary Report (07-14-20 @ 10:36):    No enteric pathogens to date: Final culture pending    Culture - Urine (collected 07-11-20 @ 21:20)  Source: .Urine Clean Catch (Midstream)  Final Report (07-13-20 @ 09:28):    >=3 organisms. Probable collection contamination.            RADIOLOGY & ADDITIONAL TESTS:  Personal review of radiological diagnostics performed  Echo and EKG results noted when applicable.     MEDICATIONS:  ALBUTerol    90 MICROgram(s) HFA Inhaler 2 Puff(s) Inhalation every 6 hours PRN  chlorhexidine 4% Liquid 1 Application(s) Topical <User Schedule>  diphenhydrAMINE 25 milliGRAM(s) Oral every 4 hours PRN  lactated ringers. 1000 milliLiter(s) IV Continuous <Continuous>  latanoprost 0.005% Ophthalmic Solution 1 Drop(s) Both EYES at bedtime  magnesium oxide 400 milliGRAM(s) Oral three times a day with meals  meropenem  IVPB 1000 milliGRAM(s) IV Intermittent every 8 hours  metoprolol succinate ER 25 milliGRAM(s) Oral daily  montelukast 10 milliGRAM(s) Oral daily  morphine  - Injectable 2 milliGRAM(s) IV Push every 4 hours PRN  saccharomyces boulardii 250 milliGRAM(s) Oral two times a day  simvastatin 20 milliGRAM(s) Oral at bedtime  vancomycin  IVPB 1000 milliGRAM(s) IV Intermittent every 12 hours      ANTIBIOTICS:  meropenem  IVPB 1000 milliGRAM(s) IV Intermittent every 8 hours  vancomycin  IVPB 1000 milliGRAM(s) IV Intermittent every 12 hours

## 2020-07-14 NOTE — CONSULT NOTE ADULT - ASSESSMENT
· Assessment		  # RLQ abdominal pain, secondary to Right sided hydronephrosis due to ureteral obstruction by pelvic mass  - CT A/P shows Previously 11.7 cm pelvic mass now measuring 7 cm and containing air-fluid levels. Findings may reflect necrotic component versus postsurgical change and severe right hydronephrosis and dilatation of the right ureter suggesting obstruction secondary to encasement of the right ureter by the pelvic mass.  -pelvic mass is in all probability tumor with central necrosis and not of infectious etiology  -Urine very purulent with pyuria and UCx mixed maria guadalupe  -will treat as pyelonephritis for now  -would not expect the right hydronephrosis to resolve as there is in all probability a distal tumor obstruction of the ureter  # Diarrhea; w/u NGTD    RECOMMENDATIONS;   repeat UCx  D/c meropenem  cefepime 2 gm iv q12h

## 2020-07-15 NOTE — PROGRESS NOTE ADULT - SUBJECTIVE AND OBJECTIVE BOX
Hospital Day:  4d    Patient is a 74y old  Female who presents with a chief complaint of 4d    Subjective:    Past Medical Hx:   Osteoporosis  Obesity  COPD (chronic obstructive pulmonary disease)  Asthma  Diabetes  Hypertension  Breast cancer  Cervical cancer    Past Sx:  History of appendectomy  History of lumpectomy of left breast    Allergies:  iodinated radiocontrast agents (Other)  penicillin (Unknown)    Current Meds:   Standng Meds:  cefepime   IVPB      cefepime   IVPB 2000 milliGRAM(s) IV Intermittent every 12 hours  chlorhexidine 4% Liquid 1 Application(s) Topical <User Schedule>  lactated ringers. 1000 milliLiter(s) (100 mL/Hr) IV Continuous <Continuous>  latanoprost 0.005% Ophthalmic Solution 1 Drop(s) Both EYES at bedtime  magnesium oxide 400 milliGRAM(s) Oral three times a day with meals  metoprolol succinate ER 25 milliGRAM(s) Oral daily  montelukast 10 milliGRAM(s) Oral daily  saccharomyces boulardii 250 milliGRAM(s) Oral two times a day  simvastatin 20 milliGRAM(s) Oral at bedtime  tamoxifen 20 milliGRAM(s) Oral daily  vancomycin  IVPB 1000 milliGRAM(s) IV Intermittent every 12 hours    PRN Meds:  ALBUTerol    90 MICROgram(s) HFA Inhaler 2 Puff(s) Inhalation every 6 hours PRN Bronchospasm  diphenhydrAMINE 25 milliGRAM(s) Oral every 4 hours PRN Rash and/or Itching  morphine  - Injectable 2 milliGRAM(s) IV Push every 4 hours PRN Severe Pain (7 - 10)    HOME MEDICATIONS:  Advair Diskus 100 mcg-50 mcg inhalation powder: 1 puff(s) inhaled 2 times a day  albuterol 90 mcg/inh inhalation aerosol: 2 puff(s) inhaled 4 times a day, As Needed  alendronate weekly: 70 milligram(s) orally once a week (wednesday)  aspirin 81 mg oral tablet: 1 tab(s) orally once a day  Eliquis 5 mg oral tablet: 1 tab(s) orally 2 times a day  enalapril 10 mg oral tablet: 1 tab(s) orally once a day  latanoprost 0.005% ophthalmic solution: 1 drop(s) to each affected eye once a day (in the evening)  megestrol 20 mg oral tablet: 1 tab(s) orally 4 times a day  metoprolol succinate 25 mg oral tablet, extended release: 1 tab(s) orally once a day  montelukast 10 mg oral tablet: 1 tab(s) orally once a day  simvastatin 20 mg oral tablet: 1 tab(s) orally once a day (at bedtime)  tamoxifen 20 mg oral tablet: 1 tab(s) orally once a day      Vital Signs:   T(F): 98.3 (07-15-20 @ 07:36), Max: 98.6 (07-15-20 @ 00:00)  HR: 113 (07-15-20 @ 07:36) (113 - 124)  BP: 100/54 (07-15-20 @ 07:36) (85/49 - 100/54)  RR: 18 (07-15-20 @ 07:36) (18 - 18)  SpO2: --      20 @ 07:01  -  07-15-20 @ 07:00  --------------------------------------------------------  IN: 860 mL / OUT: 275 mL / NET: 585 mL        Physical Exam:   GENERAL: NAD  HEENT: NCAT  CHEST/LUNG: CTAB  HEART: Regular rate and rhythm; s1 s2 appreciated, No murmurs, rubs, or gallops  ABDOMEN: Soft, Nontender, Nondistended; Bowel sounds present  EXTREMITIES: No LE edema b/l  SKIN: no rashes, no new lesions  NERVOUS SYSTEM:  Alert & Oriented X3  LINES/CATHETERS:        Labs:                         7.8    9.45  )-----------( 213      ( 15 Jul 2020 07:26 )             24.8     Neutophil% 88.1, Lymphocyte% 6.3, Monocyte% 3.7, Bands% 0.5 07-15-20 @ 07:26    15 Jul 2020 07:26    136    |  104    |  13     ----------------------------<  104    4.2     |  18     |  1.1      Ca    7.4        15 Jul 2020 07:26  Mg     2.1       15 Jul 2020 07:26    TPro  5.7    /  Alb  2.2    /  TBili  0.4    /  DBili  x      /  AST  13     /  ALT  9      /  AlkPhos  122    2020 11:31        Amylase --, Lipase 62, 20 @ 15:55        Troponin <0.01, CKMB --, CK -- 20 @ 15:55    Iron 25, TIBC 102, %Sat 25 Ferritin 844 20 @ 18:08      Urinalysis Basic - ( 2020 21:20 )    Color: Yellow / Appearance: Turbid / S.020 / pH: x  Gluc: x / Ketone: Negative  / Bili: Negative / Urobili: <2 mg/dL   Blood: x / Protein: 30 mg/dL / Nitrite: Negative   Leuk Esterase: Large / RBC: 9 /HPF /  /HPF   Sq Epi: x / Non Sq Epi: 5 /HPF / Bacteria: Negative          Culture - Blood (collected 20 @ 15:44)  Source: .Blood Blood  Preliminary Report (20 @ 23:44):    No growth to date.    Radiology:     < from: CT Abdomen and Pelvis No Cont (20 @ 17:50) >  Since 2018;  Previously 11.7 cm pelvic mass now measuring 7 cm and containing air-fluid levels. Findings may reflect necrotic component versus postsurgical change.  Hepatic lesion and ultrasound suggested  Moderate ascites  .Severe right hydronephrosis and dilatation of the right ureter suggesting obstruction secondary to encasement of the right ureter by the pelvic mass.    Multiple small bowel loops containing air-fluid levels without distention or obvious point of obstruction.    Diffuse osseous sclerotic lesions throughout the bony pelvis and thoracolumbar spine suggesting metastatic bone disease.    < end of copied text >    Assessment and Plan:   #RLQ/suprapubic pain 2/2 right hydronephrosis 2/2 obstructive pelvic mass  -necrotic pelvic mass seen on imaging from [facility] with possible evidence of fistulization to the cecum (records in chart)Culture   -catherine in place draining what appears to be fecal matter   -uro and gen surg eval noted   -seen by IR no perc nephrostomy recommended   -cont pain control  -f/u bcx and urine culture : Results:   >=3 organisms. Probable collection contamination. (20 @ 21:20)  will repeat Urine culture today.  - C. diff not detected  -maintain catherien at this time  -pt cannot have contrast studies given iodine allergy she states when she had contrast in the past she needed to be resuscitated given her adverse rxn   -F/U palliative consult to further speak with pt given her overall prognosis , Pt is still reluctant on making a decision of possible ostomy, colonoscopy    #leukocytosis , treated as pyelonephritis  ?2/2 necrotic pelvic mass  send bcx   start empiric antibiotics (PCN allergy per pt when she was very young and she does not recall rxn)  ID consult-->change to cefepime 2g q12h and repeat urine culture   monitor fever curve  daily CBC    #anemia likely 2/2 iron deficiency vs. hematuria  cont iron supplementation  trend cbc transfuse if hgb <7  maintain active type and screen   monitor for overt bleeding    #cervical/uterine/breast cancer history  cont tamoxifen, megestrol  pt to follow with outpt onc on discharge      #hx of right LE DVT hold eliquis at this time given hematuria and drop in hgb     FULL code poor overall prognosis   Activity As tolerated  Diet: regular  COde: Full  Dispo: Acute for now Hospital Day:  4d    Patient is a 74y old  Female who presents with a chief complaint of 4d    Subjective:  pt seen and examined at bedside. Not complaining of any pain.   Still having watery diarrhea and mild abdominal pain. CDIFF negative. On ABx, BP running on the low side, with tachycardia.   Pending palliative care consult for GOC.     Past Medical Hx:   Osteoporosis  Obesity  COPD (chronic obstructive pulmonary disease)  Asthma  Diabetes  Hypertension  Breast cancer  Cervical cancer    Past Sx:  History of appendectomy  History of lumpectomy of left breast    Allergies:  iodinated radiocontrast agents (Other)  penicillin (Unknown)    Current Meds:   Standng Meds:  cefepime   IVPB      cefepime   IVPB 2000 milliGRAM(s) IV Intermittent every 12 hours  chlorhexidine 4% Liquid 1 Application(s) Topical <User Schedule>  lactated ringers. 1000 milliLiter(s) (100 mL/Hr) IV Continuous <Continuous>  latanoprost 0.005% Ophthalmic Solution 1 Drop(s) Both EYES at bedtime  magnesium oxide 400 milliGRAM(s) Oral three times a day with meals  metoprolol succinate ER 25 milliGRAM(s) Oral daily  montelukast 10 milliGRAM(s) Oral daily  saccharomyces boulardii 250 milliGRAM(s) Oral two times a day  simvastatin 20 milliGRAM(s) Oral at bedtime  tamoxifen 20 milliGRAM(s) Oral daily  vancomycin  IVPB 1000 milliGRAM(s) IV Intermittent every 12 hours    PRN Meds:  ALBUTerol    90 MICROgram(s) HFA Inhaler 2 Puff(s) Inhalation every 6 hours PRN Bronchospasm  diphenhydrAMINE 25 milliGRAM(s) Oral every 4 hours PRN Rash and/or Itching  morphine  - Injectable 2 milliGRAM(s) IV Push every 4 hours PRN Severe Pain (7 - 10)    HOME MEDICATIONS:  Advair Diskus 100 mcg-50 mcg inhalation powder: 1 puff(s) inhaled 2 times a day  albuterol 90 mcg/inh inhalation aerosol: 2 puff(s) inhaled 4 times a day, As Needed  alendronate weekly: 70 milligram(s) orally once a week (wednesday)  aspirin 81 mg oral tablet: 1 tab(s) orally once a day  Eliquis 5 mg oral tablet: 1 tab(s) orally 2 times a day  enalapril 10 mg oral tablet: 1 tab(s) orally once a day  latanoprost 0.005% ophthalmic solution: 1 drop(s) to each affected eye once a day (in the evening)  megestrol 20 mg oral tablet: 1 tab(s) orally 4 times a day  metoprolol succinate 25 mg oral tablet, extended release: 1 tab(s) orally once a day  montelukast 10 mg oral tablet: 1 tab(s) orally once a day  simvastatin 20 mg oral tablet: 1 tab(s) orally once a day (at bedtime)  tamoxifen 20 mg oral tablet: 1 tab(s) orally once a day      Vital Signs:   T(F): 98.3 (07-15-20 @ 07:36), Max: 98.6 (07-15-20 @ 00:00)  HR: 113 (07-15-20 @ 07:36) (113 - 124)  BP: 100/54 (07-15-20 @ 07:36) (85/49 - 100/54)  RR: 18 (07-15-20 @ 07:36) (18 - 18)  SpO2: --      20 @ 07:01  -  07-15-20 @ 07:00  --------------------------------------------------------  IN: 860 mL / OUT: 275 mL / NET: 585 mL        Physical Exam:   GENERAL: older woman, laying in bed, not in distress  HEENT: NCAT  CHEST/LUNG: CTAB  HEART: Regular rate and rhythm; s1 s2 appreciated, No murmurs, rubs, or gallops  ABDOMEN: Soft, Nontender, Nondistended; Bowel sounds present  EXTREMITIES: No LE edema b/l  SKIN: no rashes, no new lesions  NERVOUS SYSTEM:  Alert & Oriented X3  LINES/CATHETERS: Catherine, showing Fecal material        Labs:                         7.8    9.45  )-----------( 213      ( 15 Jul 2020 07:26 )             24.8     Neutophil% 88.1, Lymphocyte% 6.3, Monocyte% 3.7, Bands% 0.5 07-15-20 @ 07:26    15 Jul 2020 07:26    136    |  104    |  13     ----------------------------<  104    4.2     |  18     |  1.1      Ca    7.4        15 Jul 2020 07:26  Mg     2.1       15 Jul 2020 07:26    TPro  5.7    /  Alb  2.2    /  TBili  0.4    /  DBili  x      /  AST  13     /  ALT  9      /  AlkPhos  122    2020 11:31        Amylase --, Lipase 62, 20 @ 15:55        Troponin <0.01, CKMB --, CK -- 20 @ 15:55    Iron 25, TIBC 102, %Sat 25 Ferritin 844 20 @ 18:08      Urinalysis Basic - ( 2020 21:20 )    Color: Yellow / Appearance: Turbid / S.020 / pH: x  Gluc: x / Ketone: Negative  / Bili: Negative / Urobili: <2 mg/dL   Blood: x / Protein: 30 mg/dL / Nitrite: Negative   Leuk Esterase: Large / RBC: 9 /HPF /  /HPF   Sq Epi: x / Non Sq Epi: 5 /HPF / Bacteria: Negative          Culture - Blood (collected 20 @ 15:44)  Source: .Blood Blood  Preliminary Report (20 @ 23:44):    No growth to date.    Radiology:     < from: CT Abdomen and Pelvis No Cont (20 @ 17:50) >  Since 2018;  Previously 11.7 cm pelvic mass now measuring 7 cm and containing air-fluid levels. Findings may reflect necrotic component versus postsurgical change.  Hepatic lesion and ultrasound suggested  Moderate ascites  .Severe right hydronephrosis and dilatation of the right ureter suggesting obstruction secondary to encasement of the right ureter by the pelvic mass.    Multiple small bowel loops containing air-fluid levels without distention or obvious point of obstruction.    Diffuse osseous sclerotic lesions throughout the bony pelvis and thoracolumbar spine suggesting metastatic bone disease.    < end of copied text >    Assessment and Plan:   #RLQ/suprapubic pain 2/2 right hydronephrosis 2/2 obstructive pelvic mass  -necrotic pelvic mass seen on imaging from [facility] with possible evidence of fistulization to the cecum (records in chart)Culture   -catherine in place draining what appears to be fecal matter   -uro and gen surg eval noted   -seen by IR no perc nephrostomy recommended   -cont pain control  -f/u bcx and urine culture : Results:   >=3 organisms. Probable collection contamination. (20 @ 21:20)  will repeat Urine culture today.  - C. diff not detected  -maintain catherine at this time  -pt cannot have contrast studies given iodine allergy she states when she had contrast in the past she needed to be resuscitated given her adverse rxn   -F/U palliative consult to further speak with pt given her overall prognosis , Pt is still reluctant on making a decision of possible ostomy, colonoscopy    #leukocytosis , treated as pyelonephritis  ?2/2 necrotic pelvic mass  send bcx   start empiric antibiotics (PCN allergy per pt when she was very young and she does not recall rxn)  ID consult-->change to cefepime 2g q12h and repeat urine culture   monitor fever curve  daily CBC    #anemia likely 2/2 iron deficiency vs. hematuria  DVT  cont iron supplementation  trend cbc transfuse if hgb <7  maintain active type and screen   monitor for overt bleeding  -Eliquis was held on admission for hematuria, but resumed today    #cervical/uterine/breast cancer history  cont tamoxifen, megestrol  pt to follow with outpt onc on discharge      #hx of right LE DVT held eliquis on admission at this time given hematuria and drop in hgb   - resumed Eliquis 5 BID today  - F/U cbc    FULL code poor overall prognosis   Activity As tolerated  Diet: regular  COde: Full  Dispo: Acute for now Hospital Day:  4d    Patient is a 74y old  Female who presents with a chief complaint of 4d    Subjective:  pt seen and examined at bedside. Not complaining of any pain.   Still having watery diarrhea and mild abdominal pain. CDIFF negative. On ABx, BP running on the low side, with tachycardia.   Pending palliative care consult for GOC.     Past Medical Hx:   Osteoporosis  Obesity  COPD (chronic obstructive pulmonary disease)  Asthma  Diabetes  Hypertension  Breast cancer  Cervical cancer    Past Sx:  History of appendectomy  History of lumpectomy of left breast    Allergies:  iodinated radiocontrast agents (Other)  penicillin (Unknown)    Current Meds:   Standng Meds:  cefepime   IVPB      cefepime   IVPB 2000 milliGRAM(s) IV Intermittent every 12 hours  chlorhexidine 4% Liquid 1 Application(s) Topical <User Schedule>  lactated ringers. 1000 milliLiter(s) (100 mL/Hr) IV Continuous <Continuous>  latanoprost 0.005% Ophthalmic Solution 1 Drop(s) Both EYES at bedtime  magnesium oxide 400 milliGRAM(s) Oral three times a day with meals  metoprolol succinate ER 25 milliGRAM(s) Oral daily  montelukast 10 milliGRAM(s) Oral daily  saccharomyces boulardii 250 milliGRAM(s) Oral two times a day  simvastatin 20 milliGRAM(s) Oral at bedtime  tamoxifen 20 milliGRAM(s) Oral daily  vancomycin  IVPB 1000 milliGRAM(s) IV Intermittent every 12 hours    PRN Meds:  ALBUTerol    90 MICROgram(s) HFA Inhaler 2 Puff(s) Inhalation every 6 hours PRN Bronchospasm  diphenhydrAMINE 25 milliGRAM(s) Oral every 4 hours PRN Rash and/or Itching  morphine  - Injectable 2 milliGRAM(s) IV Push every 4 hours PRN Severe Pain (7 - 10)    HOME MEDICATIONS:  Advair Diskus 100 mcg-50 mcg inhalation powder: 1 puff(s) inhaled 2 times a day  albuterol 90 mcg/inh inhalation aerosol: 2 puff(s) inhaled 4 times a day, As Needed  alendronate weekly: 70 milligram(s) orally once a week (wednesday)  aspirin 81 mg oral tablet: 1 tab(s) orally once a day  Eliquis 5 mg oral tablet: 1 tab(s) orally 2 times a day  enalapril 10 mg oral tablet: 1 tab(s) orally once a day  latanoprost 0.005% ophthalmic solution: 1 drop(s) to each affected eye once a day (in the evening)  megestrol 20 mg oral tablet: 1 tab(s) orally 4 times a day  metoprolol succinate 25 mg oral tablet, extended release: 1 tab(s) orally once a day  montelukast 10 mg oral tablet: 1 tab(s) orally once a day  simvastatin 20 mg oral tablet: 1 tab(s) orally once a day (at bedtime)  tamoxifen 20 mg oral tablet: 1 tab(s) orally once a day      Vital Signs:   T(F): 98.3 (07-15-20 @ 07:36), Max: 98.6 (07-15-20 @ 00:00)  HR: 113 (07-15-20 @ 07:36) (113 - 124)  BP: 100/54 (07-15-20 @ 07:36) (85/49 - 100/54)  RR: 18 (07-15-20 @ 07:36) (18 - 18)  SpO2: --      20 @ 07:01  -  07-15-20 @ 07:00  --------------------------------------------------------  IN: 860 mL / OUT: 275 mL / NET: 585 mL        Physical Exam:   GENERAL: older woman, laying in bed, not in distress  HEENT: NCAT  CHEST/LUNG: CTAB  HEART: Regular rate and rhythm; s1 s2 appreciated, No murmurs, rubs, or gallops  ABDOMEN: Soft, Nontender, Nondistended; Bowel sounds present  EXTREMITIES: No LE edema b/l  SKIN: no rashes, no new lesions  NERVOUS SYSTEM:  Alert & Oriented X3  LINES/CATHETERS: Catherine, showing Fecal material        Labs:                         7.8    9.45  )-----------( 213      ( 15 Jul 2020 07:26 )             24.8     Neutophil% 88.1, Lymphocyte% 6.3, Monocyte% 3.7, Bands% 0.5 07-15-20 @ 07:26    15 Jul 2020 07:26    136    |  104    |  13     ----------------------------<  104    4.2     |  18     |  1.1      Ca    7.4        15 Jul 2020 07:26  Mg     2.1       15 Jul 2020 07:26    TPro  5.7    /  Alb  2.2    /  TBili  0.4    /  DBili  x      /  AST  13     /  ALT  9      /  AlkPhos  122    2020 11:31        Amylase --, Lipase 62, 20 @ 15:55        Troponin <0.01, CKMB --, CK -- 20 @ 15:55    Iron 25, TIBC 102, %Sat 25 Ferritin 844 20 @ 18:08      Urinalysis Basic - ( 2020 21:20 )    Color: Yellow / Appearance: Turbid / S.020 / pH: x  Gluc: x / Ketone: Negative  / Bili: Negative / Urobili: <2 mg/dL   Blood: x / Protein: 30 mg/dL / Nitrite: Negative   Leuk Esterase: Large / RBC: 9 /HPF /  /HPF   Sq Epi: x / Non Sq Epi: 5 /HPF / Bacteria: Negative          Culture - Blood (collected 20 @ 15:44)  Source: .Blood Blood  Preliminary Report (20 @ 23:44):    No growth to date.    Radiology:     < from: CT Abdomen and Pelvis No Cont (20 @ 17:50) >  Since 2018;  Previously 11.7 cm pelvic mass now measuring 7 cm and containing air-fluid levels. Findings may reflect necrotic component versus postsurgical change.  Hepatic lesion and ultrasound suggested  Moderate ascites  .Severe right hydronephrosis and dilatation of the right ureter suggesting obstruction secondary to encasement of the right ureter by the pelvic mass.    Multiple small bowel loops containing air-fluid levels without distention or obvious point of obstruction.    Diffuse osseous sclerotic lesions throughout the bony pelvis and thoracolumbar spine suggesting metastatic bone disease.    < end of copied text >    Assessment and Plan:   #RLQ/suprapubic pain 2/2 obstructive pelvic mass  #Right sided hydronephrosis 2/2 pelvic mass with necrosis   #Colovesical fistula   -necrotic pelvic mass seen on imaging from [facility] with possible evidence of fistulization to the cecum (records in chart)Culture   -catherine in place draining what appears to be fecal matter   -uro and gen surg eval noted   -seen by IR no perc nephrostomy recommended   -cont pain control  -f/u bcx and urine culture : Results:   >=3 organisms. Probable collection contamination. (20 @ 21:20)  will repeat Urine culture today.  - C. diff not detected  -maintain catherine at this time  -pt cannot have contrast studies given iodine allergy she states when she had contrast in the past she needed to be resuscitated given her adverse rxn   -F/U palliative consult to further speak with pt given her overall prognosis. Pt is still reluctant on making a decision of possible ostomy, colonoscopy    #leukocytosis, possible pyelonephritis  possibly 2/2 necrotic pelvic mass vs pyelo  send bcx   c/w empiric antibiotics (PCN allergy per pt when she was very young and she does not recall rxn)  ID consult-->change to cefepime 2g q12h and repeat urine culture   monitor fever curve  daily CBC    #chronic anemia likely 2/2 iron deficiency vs. hematuria  cont iron supplementation  trend cbc transfuse if hgb <7  maintain active type and screen   monitor for overt bleeding  -Eliquis was held on admission for hematuria, but resumed today    #cervical/uterine/breast cancer history  cont tamoxifen, megestrol  pt to follow with outpt onc on discharge      #hx of right LE DVT held Eliquis on admission at this time given hematuria and drop in hgb   - resumed Eliquis 5 BID today  - F/U cbc    FULL code poor overall prognosis   Activity As tolerated  Diet: regular  COde: Full  Dispo: Acute for now

## 2020-07-15 NOTE — PROGRESS NOTE ADULT - ASSESSMENT
74y Female patient admitted for colovesicular fistula. Patient being seen by nephrology for R ureter obstruction, with nothing to do on their end. Patient is poor surgical candidate, but will follow for need of diverting ostomy.    Patient seen & examined at bedside. In NAD & has no complaints.     Plan:  - Continue Pain control  - Continue antibiotics  - OOB as tolerated  - Continue Incentive Spirometry  - f/u vitals  - f/u labs & replete as necessary  - DVT PPX  - GI PPX  - Speak w/ SW/CM

## 2020-07-15 NOTE — CONSULT NOTE ADULT - ASSESSMENT
Consult Summary  74 year old woman with history of uterine cancer 20 years ago s/p radiation, history of breast cancer s/p lumpectomy + radiation,  COPD presented with diarrhea and abdominal pain. Hospital course complicated by evidence of hydronephresis in the setting of likely obstruction, evidence of an obstructing necrotic pelvic mass, and evidence of a colovesical fistula.  Palliative care consulted for GOC.    Patient seen at bedside. Given evidence of likely colovesical fistula, surgery has seen the patient and has offered colonoscopy and possible ostomy formation, though it is high risk.  Patient has been unsure about whether or not she wants surgery.      We met the patient at bedside. She denied any pain, SOB, or other symptoms at that time.  She discussed that prior to going to the hospital, she was going to start on treatment of her cancer per her outside oncologist, though she didn't know what it was. At the time of exam, primary team was still waiting on information from her outside oncologist regarding treatment. We discussed the patient's overall clinical picture, and she stated that she didn't want surgery unless she absolutely needed it, but if she needed it in order to continue to get treatment for her cancer, she would consider it.  She wished to hear back from her oncologist prior to making decisions regarding next steps.  We discussed what would happen if she decided to not get surgery or continue treatment, and she stated she would die.  We briefly discussed overall goals of care including hospice and code status, but she refused to discuss hospice and stated that she wished to be full code.    Morphine Equivalent Daily Dose (MEDD):  0mg    Recommendations:  -full code, ongoing medical management per primary team  -patient will consider surgical intervention pending information regarding further treatment of her malignancy  -patient did not want to consider and discuss hospice at this time  -continue PRN morphine per primary team for pain management (hold for sedation, confusion, RR<10)  -palliative care will continue to follow and be available for GOC discussions as appropriate.    Please Call [phone] PRN

## 2020-07-15 NOTE — PROGRESS NOTE ADULT - ASSESSMENT
· Assessment		  # Resolved RLQ abdominal pain, secondary to Right sided hydronephrosis due to ureteral obstruction by pelvic mass  - CT A/P shows Previously 11.7 cm pelvic mass now measuring 7 cm and containing air-fluid levels. Findings may reflect necrotic component versus postsurgical change and severe right hydronephrosis and dilatation of the right ureter suggesting obstruction secondary to encasement of the right ureter by the pelvic mass.  -pelvic mass is in all probability tumor with central necrosis and not of infectious etiology  -Urine very purulent with pyuria and UCx mixed maria guadalupe  -will treat as pyelonephritis for now  -would not expect the right hydronephrosis to resolve as there is in all probability a distal tumor obstruction of the ureter  # Diarrhea; resolved w/u NGTD. CD NG    RECOMMENDATIONS;   repeat UCx  cefepime 2 gm iv q12h

## 2020-07-15 NOTE — CONSULT NOTE ADULT - SUBJECTIVE AND OBJECTIVE BOX
REQUESTED OF: DR BEY    Chart reviewed, Hospital Day 5    RILEY MARIE 74yFemale  HPI:  75 yo F with PMH of cervical cancer s/p radiation therapy (20 years ago), breast cancer (ER+ / DE+) s/p lumpectomy + radiation thereby (6 years ago), uterine cancer  s/p chemotherapy ( 3 years ago) as per patient, metastasis to bones,  COPD not on home oxygen, DM, HTN, osteoporosis, DVT on eliquis, Anemia of chronic disease, CKD 3 presents to ED with R sided abdominal pain and diarrhea.   Pt reports gradual onset, episodic, 7/10 RLQ abdominal pain radiating to groin for past 2 days, with no aggravating or relieving factors. Pt reports 100lb weight loss in past 1 year due to malignant cancer.   Pt also reports persistent non bloody, non mucoid, watery diarrhea for past 3 weeks, 4-5 episodes per day, not associated with food intake, and no recent travel or sick contact history. Pt has not passed any stool today.   Denies any fever, cough, chest pain, SOB, n/v, recent travel or sick contacts.     In the ED, vitals were WNL,  Pt received 1L fluid bolus, CT A/P showed severe right hydronephrosis and dilatation of R ureter suggesting obstruction  due to encasement of right ureter. Urology was consulted, recommended no acute intervention at this time, pt refused the Amin catheter placement. Pt will be admitted under medicine for further work up. (12 Jul 2020 01:03)        PAST MEDICAL & SURGICAL HISTORY:  Osteoporosis  Obesity: bmi 41  COPD (chronic obstructive pulmonary disease)  Asthma: last attack 1/2018- hospitalized&amp; steroids  Diabetes  Hypertension  Breast cancer: left, radiation ~2 yrs ago  Cervical cancer: radiation&amp; chemo ~15 yrs ago  History of appendectomy  History of lumpectomy of left breast      Focused Palliative Care Evaluation:                   Symptoms:                                      Pain patient stated pain in adbomen well controlled today                                     Dyspnea denied                                     N/V                                     Appetite                                     Anxiety                                     Other _____________________                     Support Devices:              PHYSICAL EXAM:    GEN: NAD, lying in bed  HEENT:  EOMI, no scleral icterus  CV:  tachycardia  Lungs: no accessory muscle use, no wheezing  Abd: not distended  Ext: no edmea noted        T(C): 36.8, Max: 37 (00:00)  HR: 113 (113 - 124)  BP: 100/54 (85/49 - 100/54)  RR: 18 (18 - 18)  SpO2: --      LABS/STUDIES:  07-15    136  |  104  |  13  ----------------------------<  104<H>  4.2   |  18  |  1.1    Ca    7.4<L>      15 Jul 2020 07:26  Mg     2.1     07-15    TPro  5.7<L>  /  Alb  2.2<L>  /  TBili  0.4  /  DBili  x   /  AST  13  /  ALT  9   /  AlkPhos  122<H>  07-14                            7.8    9.45  )-----------( 213      ( 15 Jul 2020 07:26 )             24.8       MEDICATIONS  (STANDING):  apixaban 5 milliGRAM(s) Oral every 12 hours  cefepime   IVPB      cefepime   IVPB 2000 milliGRAM(s) IV Intermittent every 12 hours  chlorhexidine 4% Liquid 1 Application(s) Topical <User Schedule>  lactated ringers. 1000 milliLiter(s) (100 mL/Hr) IV Continuous <Continuous>  latanoprost 0.005% Ophthalmic Solution 1 Drop(s) Both EYES at bedtime  magnesium oxide 400 milliGRAM(s) Oral three times a day with meals  metoprolol succinate ER 25 milliGRAM(s) Oral daily  montelukast 10 milliGRAM(s) Oral daily  saccharomyces boulardii 250 milliGRAM(s) Oral two times a day  simvastatin 20 milliGRAM(s) Oral at bedtime  tamoxifen 20 milliGRAM(s) Oral daily    MEDICATIONS  (PRN):  ALBUTerol    90 MICROgram(s) HFA Inhaler 2 Puff(s) Inhalation every 6 hours PRN Bronchospasm  diphenhydrAMINE 25 milliGRAM(s) Oral every 4 hours PRN Rash and/or Itching  morphine  - Injectable 2 milliGRAM(s) IV Push every 4 hours PRN Severe Pain (7 - 10)          iStop:  Maxime Bey | Reference #: [id number] - no meds listed        PPS  Level    40%       Note PPS = Palliative Performance Scale; (c)2001, [facility] Society       Range from 100% meaning Full ambulation/self-care/intake/Level of Consicous                                                                              to        10% meaning Bedbound/Unable to do any activity/extensive disease /Total Care/ No PO intake/ LOC=Full/drowsy/+/-confusion        (0% = death)

## 2020-07-15 NOTE — PROGRESS NOTE ADULT - SUBJECTIVE AND OBJECTIVE BOX
RILEY MARIE  74y, Female    All available historical data reviewed    OVERNIGHT EVENTS:  no fevers  no diarrhea  feels well and has no complaints     ROS:  General: Denies rigors, nightsweats  HEENT: Denies headache, rhinorrhea, sore throat, eye pain  CV: Denies CP, palpitations  PULM: Denies wheezing, hemoptysis  GI: Denies hematemesis, hematochezia, melena  : Denies discharge, hematuria  MSK: Denies arthralgias, myalgias  SKIN: Denies rash, lesions  NEURO: Denies paresthesias, weakness  PSYCH: Denies depression, anxiety    VITALS:  T(F): 98.3, Max: 98.6 (07-15-20 @ 00:00)  HR: 113  BP: 100/54  RR: 18Vital Signs Last 24 Hrs  T(C): 36.8 (15 Jul 2020 07:36), Max: 37 (15 Jul 2020 00:00)  T(F): 98.3 (15 Jul 2020 07:36), Max: 98.6 (15 Jul 2020 00:00)  HR: 113 (15 Jul 2020 07:36) (113 - 124)  BP: 100/54 (15 Jul 2020 07:36) (85/49 - 100/54)  BP(mean): --  RR: 18 (15 Jul 2020 07:36) (18 - 18)  SpO2: --    TESTS & MEASUREMENTS:                        7.8    9.45  )-----------( 213      ( 15 Jul 2020 07:26 )             24.8     07-15    136  |  104  |  13  ----------------------------<  104<H>  4.2   |  18  |  1.1    Ca    7.4<L>      15 Jul 2020 07:26  Mg     2.1     07-15    TPro  5.7<L>  /  Alb  2.2<L>  /  TBili  0.4  /  DBili  x   /  AST  13  /  ALT  9   /  AlkPhos  122<H>  07-14    LIVER FUNCTIONS - ( 14 Jul 2020 11:31 )  Alb: 2.2 g/dL / Pro: 5.7 g/dL / ALK PHOS: 122 U/L / ALT: 9 U/L / AST: 13 U/L / GGT: x             Culture - Blood (collected 07-13-20 @ 15:44)  Source: .Blood Blood  Preliminary Report (07-14-20 @ 23:44):    No growth to date.    Culture - Stool (collected 07-12-20 @ 19:25)  Source: .Stool Feces  Preliminary Report (07-14-20 @ 10:36):    No enteric pathogens to date: Final culture pending    Culture - Urine (collected 07-11-20 @ 21:20)  Source: .Urine Clean Catch (Midstream)  Final Report (07-13-20 @ 09:28):    >=3 organisms. Probable collection contamination.            RADIOLOGY & ADDITIONAL TESTS:  Personal review of radiological diagnostics performed  Echo and EKG results noted when applicable.     MEDICATIONS:  ALBUTerol    90 MICROgram(s) HFA Inhaler 2 Puff(s) Inhalation every 6 hours PRN  apixaban 5 milliGRAM(s) Oral every 12 hours  cefepime   IVPB      cefepime   IVPB 2000 milliGRAM(s) IV Intermittent every 12 hours  chlorhexidine 4% Liquid 1 Application(s) Topical <User Schedule>  diphenhydrAMINE 25 milliGRAM(s) Oral every 4 hours PRN  lactated ringers. 1000 milliLiter(s) IV Continuous <Continuous>  latanoprost 0.005% Ophthalmic Solution 1 Drop(s) Both EYES at bedtime  magnesium oxide 400 milliGRAM(s) Oral three times a day with meals  metoprolol succinate ER 25 milliGRAM(s) Oral daily  montelukast 10 milliGRAM(s) Oral daily  morphine  - Injectable 2 milliGRAM(s) IV Push every 4 hours PRN  saccharomyces boulardii 250 milliGRAM(s) Oral two times a day  simvastatin 20 milliGRAM(s) Oral at bedtime  tamoxifen 20 milliGRAM(s) Oral daily  vancomycin  IVPB 1000 milliGRAM(s) IV Intermittent every 12 hours      ANTIBIOTICS:  cefepime   IVPB      cefepime   IVPB 2000 milliGRAM(s) IV Intermittent every 12 hours  vancomycin  IVPB 1000 milliGRAM(s) IV Intermittent every 12 hours

## 2020-07-15 NOTE — PROGRESS NOTE ADULT - SUBJECTIVE AND OBJECTIVE BOX
Progress Note: General Surgery  Patient: RILEY MARIE , 74y (1945)Female   MRN: [id number]  Location: [address]  Visit: 07-11-20 Inpatient  Date: 07-15-20 @ 05:48    Procedure/Diagnosis:  Colovesicular fistula    Events/ 24h: Low blood pressure asymptomatic 250cc bolus given at midnight, heart rate 116, ekg ordered at 5:00am and 250 cc LR bolus ordered in response to low BP and fast heart rate, Pain controlled.    Vitals: T(F): 98.6 (07-15-20 @ 00:00), Max: 98.6 (07-15-20 @ 00:00)  HR: 116 (07-15-20 @ 05:07)  BP: 88/52 (07-15-20 @ 05:07) (85/49 - 98/49)  RR: 18 (07-15-20 @ 00:00)  SpO2: --    In:   07-13-20 @ 07:01  -  07-14-20 @ 07:00  --------------------------------------------------------  IN: 1350 mL    07-14-20 @ 07:01  -  07-15-20 @ 05:48  --------------------------------------------------------  IN: 860 mL      Out:   07-13-20 @ 07:01  -  07-14-20 @ 07:00  --------------------------------------------------------  OUT:    Indwelling Catheter - Urethral: 250 mL  Total OUT: 250 mL      07-14-20 @ 07:01  -  07-15-20 @ 05:48  --------------------------------------------------------  OUT:    Indwelling Catheter - Urethral: 275 mL  Total OUT: 275 mL        Net:   07-13-20 @ 07:01  -  07-14-20 @ 07:00  --------------------------------------------------------  NET: 1100 mL    07-14-20 @ 07:01  -  07-15-20 @ 05:48  --------------------------------------------------------  NET: 585 mL        Diet: Diet, Consistent Carbohydrate/No Snacks:   DASH/TLC Sodium & Cholesterol Restricted  Prosource Gelatein 20 Sugar Free     Qty per Day:  2 (07-13-20 @ 13:18)    IV Fluids: lactated ringers Bolus 250 milliLiter(s) IV Bolus once  lactated ringers. 1000 milliLiter(s) (100 mL/Hr) IV Continuous <Continuous>  magnesium oxide 400 milliGRAM(s) Oral three times a day with meals      Physical Examination:  General Appearance: NAD  HEENT: EOMI, sclera non-icteric.  Heart: RRR   Lungs: CTABL.   Abdomen:  Soft, mildly tender, no guarding  MSK/Extremities: Warm & well-perfused.   Skin: Warm, dry. No jaundice.       Medications: [Standing]  cefepime   IVPB      cefepime   IVPB 2000 milliGRAM(s) IV Intermittent every 12 hours  chlorhexidine 4% Liquid 1 Application(s) Topical <User Schedule>  lactated ringers Bolus 250 milliLiter(s) IV Bolus once  lactated ringers. 1000 milliLiter(s) (100 mL/Hr) IV Continuous <Continuous>  latanoprost 0.005% Ophthalmic Solution 1 Drop(s) Both EYES at bedtime  magnesium oxide 400 milliGRAM(s) Oral three times a day with meals  metoprolol succinate ER 25 milliGRAM(s) Oral daily  montelukast 10 milliGRAM(s) Oral daily  saccharomyces boulardii 250 milliGRAM(s) Oral two times a day  simvastatin 20 milliGRAM(s) Oral at bedtime  tamoxifen 20 milliGRAM(s) Oral daily  vancomycin  IVPB 1000 milliGRAM(s) IV Intermittent every 12 hours    DVT Prophylaxis:   GI Prophylaxis:   Antibiotics: cefepime   IVPB      cefepime   IVPB 2000 milliGRAM(s) IV Intermittent every 12 hours  vancomycin  IVPB 1000 milliGRAM(s) IV Intermittent every 12 hours    Anticoagulation:   Medications:[PRN]  ALBUTerol    90 MICROgram(s) HFA Inhaler 2 Puff(s) Inhalation every 6 hours PRN  diphenhydrAMINE 25 milliGRAM(s) Oral every 4 hours PRN  morphine  - Injectable 2 milliGRAM(s) IV Push every 4 hours PRN      Labs:                        8.1    11.70 )-----------( 252      ( 14 Jul 2020 11:31 )             26.9     07-14    138  |  104  |  14  ----------------------------<  92  4.2   |  18  |  1.0    Ca    7.6<L>      14 Jul 2020 11:31  Mg     1.2     07-14    TPro  5.7<L>  /  Alb  2.2<L>  /  TBili  0.4  /  DBili  x   /  AST  13  /  ALT  9   /  AlkPhos  122<H>  07-14    LIVER FUNCTIONS - ( 14 Jul 2020 11:31 )  Alb: 2.2 g/dL / Pro: 5.7 g/dL / ALK PHOS: 122 U/L / ALT: 9 U/L / AST: 13 U/L / GGT: x                   Urine/Micro:    Culture - Blood (collected 13 Jul 2020 15:44)  Source: .Blood Blood  Preliminary Report (14 Jul 2020 23:44):    No growth to date.    Culture - Stool (collected 12 Jul 2020 19:25)  Source: .Stool Feces  Preliminary Report (14 Jul 2020 10:36):    No enteric pathogens to date: Final culture pending          Imaging:       no new images     Date/Time: 07-15-20 @ 05:48

## 2020-07-16 NOTE — CONSULT NOTE ADULT - SUBJECTIVE AND OBJECTIVE BOX
Gastroenterology Consultation:    The patient is a 74 year-old female with a PMH of cervical cancer s/p radiation therapy 20 years ago, breast cancer s/p lumpectomy and radiation therapy 6 years ago, uterine cancer s/p carboplatin/paclitaxel 3 years ago followed by megestrol/tamoxifen by Dr. Taco Smith ([phone]) w/ metastases to bone, COPD (not on home O2), HTN, DM, CKD III, and DVT on Eliquis presenting for right sided abdominal pain and diarrhea. The patient reports that for the last 2 days she has been having right lower quadrant abdominal pain radiating to the groin; she also endorses multiple episodes of nonbloody watery diarrhea for the last 3 weeks. She denies any associated chest pain or pressure, shortness of breath, fever, chills, cough, blood in the stool or urine, nausea or vomiting. She endorses a greater than 100 lb weight loss over the last several months.    A CT abdomen/pelvis revealed severe right hydronephrosis suggesting obstruction due to encasement of the right ureter secondary to a pelvis mass measuring 7 x 3.2 x 6 cm containing air-fluid levels and multiple small bowel loops containing air-fluid levels w/o evidence of obstruction; a questionable colovesical fistula was also present which was not apparent on prior CT abdomen/pelvis in 2018. Urology had been consulted to possibly do a PCN to relieve pressure secondary to the ureteral obstruction but recommended no intervention; the patient was started on IV antibiotics and had a catherine catheter inserted, revealing feculent output.    Surgery determined the patient to be a poor surgical candidate but suggested a diverting ileostomy as a palliative treatment; the patient's oncologist, Dr. 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    Prior records Reviewed (Y/N): yes  History obtained from person other than patient (Y/N): no    Prior EGD: none  Prior Colonoscopy: none    PAST MEDICAL & SURGICAL HISTORY:  Osteoporosis  Obesity: bmi 41  COPD (chronic obstructive pulmonary disease)  Asthma: last attack 1/2018- hospitalized & steroids  Diabetes  Hypertension  Breast cancer: left, radiation ~2 yrs ago  Cervical cancer: radiation & chemo ~15 yrs ago  History of appendectomy  History of lumpectomy of left breast    FAMILY HISTORY:  Family history of breast cancer (Mother, Sibling): mother & sister    Social History:  Tobacco: quit cigarettes ~28 years ago  Alcohol: none  Drugs: none    Home Medications:  Advair Diskus 100 mcg-50 mcg inhalation powder: 1 puff(s) inhaled 2 times a day (12 Jul 2020 01:43)  albuterol 90 mcg/inh inhalation aerosol: 2 puff(s) inhaled 4 times a day, As Needed (12 Jul 2020 01:43)  alendronate weekly: 70 milligram(s) orally once a week (wednesday) (12 Jul 2020 01:43)  aspirin 81 mg oral tablet: 1 tab(s) orally once a day (12 Jul 2020 01:43)  Eliquis 5 mg oral tablet: 1 tab(s) orally 2 times a day (12 Jul 2020 01:43)  enalapril 10 mg oral tablet: 1 tab(s) orally once a day (12 Jul 2020 01:43)  latanoprost 0.005% ophthalmic solution: 1 drop(s) to each affected eye once a day (in the evening) (12 Jul 2020 01:43)  megestrol 20 mg oral tablet: 1 tab(s) orally 4 times a day (12 Jul 2020 01:43)  metoprolol succinate 25 mg oral tablet, extended release: 1 tab(s) orally once a day (12 Jul 2020 01:43)  montelukast 10 mg oral tablet: 1 tab(s) orally once a day (12 Jul 2020 01:43)  simvastatin 20 mg oral tablet: 1 tab(s) orally once a day (at bedtime) (12 Jul 2020 01:43)  tamoxifen 20 mg oral tablet: 1 tab(s) orally once a day (12 Jul 2020 01:43)    MEDICATIONS  (STANDING):  apixaban 5 milliGRAM(s) Oral every 12 hours  cefepime   IVPB 2000 milliGRAM(s) IV Intermittent every 12 hours  chlorhexidine 4% Liquid 1 Application(s) Topical <User Schedule>  lactated ringers. 1000 milliLiter(s) (100 mL/Hr) IV Continuous <Continuous>  latanoprost 0.005% Ophthalmic Solution 1 Drop(s) Both EYES at bedtime  magnesium oxide 400 milliGRAM(s) Oral three times a day with meals  metoprolol succinate ER 25 milliGRAM(s) Oral daily  montelukast 10 milliGRAM(s) Oral daily  saccharomyces boulardii 250 milliGRAM(s) Oral two times a day  simvastatin 20 milliGRAM(s) Oral at bedtime  tamoxifen 20 milliGRAM(s) Oral daily    MEDICATIONS  (PRN):  ALBUTerol    90 MICROgram(s) HFA Inhaler 2 Puff(s) Inhalation every 6 hours PRN Bronchospasm  diphenhydrAMINE 25 milliGRAM(s) Oral every 4 hours PRN Rash and/or Itching  morphine  - Injectable 2 milliGRAM(s) IV Push every 4 hours PRN Severe Pain (7 - 10)    Allergies  iodinated radiocontrast agents (Other)  penicillin (Unknown)    Review of Systems:   Constitutional:  No Fever, No Chills  ENT/Mouth: No Difficulty Swallowing  Eyes:  No Eye Pain, No Vision Changes, no headache  Cardiovascular:  No Chest Pain or pressure  Respiratory:  No Cough, No Dyspnea  Gastrointestinal:  As described in HPI; poor appetite  Musculoskeletal:  No Joint Swelling  Skin:  No Skin Lesions, No Jaundice  Neuro:  No Syncope, No Dizziness  Heme/Lymph:  No Bruising, No Bleeding.    Physical Examination:  T(C): 37 (07-16-20 @ 07:35), Max: 37 (07-16-20 @ 07:35)  HR: 113 (07-16-20 @ 10:20) (107 - 119)  BP: 120/53 (07-16-20 @ 10:20) (93/50 - 120/53)  RR: 19 (07-16-20 @ 07:35) (18 - 19)  SpO2: --      07-14-20 @ 07:01  -  07-15-20 @ 07:00  --------------------------------------------------------  IN: 860 mL / OUT: 275 mL / NET: 585 mL    07-15-20 @ 07:01  -  07-16-20 @ 07:00  --------------------------------------------------------  IN: 0 mL / OUT: 50 mL / NET: -50 mL    07-16-20 @ 07:01  -  07-16-20 @ 14:57  --------------------------------------------------------  IN: 1120 mL / OUT: 200 mL / NET: 920 mL    Constitutional: No acute distress; pleasant disposition  Head: normocephalic, atraumatic  Eyes:. Conjunctivae are clear, Sclera is non-icteric.  Ears Nose and Throat: The external ears are normal appearing,  Oral mucosa is pink and moist.  Respiratory:  No signs of respiratory distress. Lung sounds are clear bilaterally.  Cardiovascular:  S1 S2, Regular rate and rhythm.  GI: Abdomen is soft, symmetric, and non-tender without distention. There are no visible lesions or scars. Bowel sounds are present and normoactive in all four quadrants. No masses, hepatomegaly, or splenomegaly are noted.   : catherine cath in place  Neuro: No Tremor, No involuntary movements  Skin: No rashes, No Jaundice.    Data:                         7.8    9.45  )-----------( 213      ( 15 Jul 2020 07:26 )             24.8     Hgb Trend:  7.8  07-15-20 @ 07:26  8.1  07-14-20 @ 11:31    07-15    136  |  104  |  13  ----------------------------<  104<H>  4.2   |  18  |  1.1    Ca    7.4<L>      15 Jul 2020 07:26  Mg     2.1     07-15    Liver panel trend:  TBili 0.4   /   AST 13   /   ALT 9   /   AlkP 122   /   Tptn 5.7   /   Alb 2.2    /   DBili --      07-14  TBili 0.3   /   AST 16   /   ALT 9   /   AlkP 110   /   Tptn 5.4   /   Alb 2.1    /   DBili --      07-13  TBili 0.3   /   AST 12   /   ALT 9   /   AlkP 110   /   Tptn 5.5   /   Alb 2.2    /   DBili --      07-12  TBili 0.4   /   AST 15   /   ALT 10   /   AlkP 125   /   Tptn 5.9   /   Alb 2.4    /   DBili --      07-11    Culture - Blood (collected 13 Jul 2020 15:44)  Source: .Blood Blood  Preliminary Report (14 Jul 2020 23:44):    No growth to date.    Radiology:    < from: CT Abdomen and Pelvis No Cont (07.11.20 @ 17:50) >  INTERPRETATION:  CLINICAL STATEMENT:    TECHNIQUE: Contiguous axial CT images were obtained from the lower chest to the pubic symphysis without intravenous contrast.  Oral contrast was not administered.  Reformatted images in the coronal and sagittal planes were acquired.    COMPARISON CT: CT abdomen and pelvis 1/22/2018    OTHER STUDIES USED FOR CORRELATION: None.       FINDINGS:    LOWER CHEST: Trace right and small left-sided pleural effusions. Coronary artery calcifications.    HEPATOBILIARY: 1.8 cm low density lesion lateral segment left hepatic lobe which appears new and further evaluation with ultrasound and/or contrast CT suggested    SPLEEN: Unremarkable.    PANCREAS: Unremarkable.    ADRENAL GLANDS: Unremarkable.    KIDNEYS: Severe right hydronephrosis and dilatation of the right ureter suggesting obstruction secondary to encasement of the right ureter by the pelvic mass.    ABDOMINOPELVIC NODES: Decreased left para-aortic predominantly fat 3 x 1.5 cm retroperitoneal structure, likely fatty lymph node.     PELVIC ORGANS: Intra-abdominal pelvic ascites obscures underlying pelvic organs. Decreased, previously 11.7 cm, pelvic mass now measuring 7 x3.2 x 6 cm and containing air-fluid levels.    PERITONEUM/MESENTERY/BOWEL: Multiple small bowel loops containing air-fluid levels without distention or obvious point of obstruction.     BONES/SOFT TISSUES: Diffuse osseous sclerotic lesions throughout the bony pelvis and thoracolumbar spine..    OTHER:    IMPRESSION:  Since January 22, 2018;  Previously 11.7 cm pelvic mass now measuring 7 cm and containing air-fluid levels. Findings may reflect necrotic component versus postsurgical change.  Hepatic lesion and ultrasound suggested  Moderate ascites  .Severe right hydronephrosis and dilatation of the right ureter suggesting obstruction secondary to encasement of the right ureter by the pelvic mass.    Multiple small bowel loops containing air-fluid levels without distention or obvious point of obstruction.    Diffuse osseous sclerotic lesions throughout the bony pelvis and thoracolumbar spine suggesting metastatic bone disease.

## 2020-07-16 NOTE — PROGRESS NOTE ADULT - ASSESSMENT
74 year old woman with history of uterine cancer 20 years ago s/p radiation, history of breast cancer s/p lumpectomy + radiation,  COPD presented with diarrhea and abdominal pain. Hospital course complicated by evidence of hydronephresis in the setting of likely obstruction, evidence of an obstructing necrotic pelvic mass, and evidence of a colovesical fistula.  Palliative care consulted for GOC.  Patient seen at bedside on 7/16 :  Given evidence of likely colovesical fistula, surgery has seen the patient and has offered colonoscopy and possible ostomy formation, though it is high risk.  Patient was unsure about whether or not she wants surgery.      We met the patient at bedside again today. She denied any pain, SOB, or other symptoms at this time.  She was called and was told by her outside oncologist that she should go for surgery if she wants to proceed with immunotherapy . She agreed to proceed. Colorectal surgery team informed, and are trying to schedule procedure tomorrow or Monday.  We also discussed her social situation . She lives by herself in her own house . She has no children , but has good support from her nephews and nieces . She states that her niece Temitope Green is her healthcare proxy and would like to fill some papers for that.     Morphine Equivalent Daily Dose (MEDD):  0mg    Recommendations:  - Full code, ongoing medical management per primary team  - Surgical intervention to be scheduled by surgical team   - continue PRN morphine per primary team for pain management (hold for sedation, confusion, RR<10)  - Palliative care will continue to follow and be available for GOC discussions as appropriate.    Please Call [phone] PRN 74 year old woman with history of uterine cancer 20 years ago s/p radiation, history of breast cancer s/p lumpectomy + radiation,  COPD presented with diarrhea and abdominal pain. Hospital course complicated by evidence of hydronephresis in the setting of likely obstruction, evidence of an obstructing necrotic pelvic mass, and evidence of a colovesical fistula.  Palliative care consulted for GOC.  Patient seen at bedside on 7/16 :  Given evidence of likely colovesical fistula, surgery has seen the patient and has offered colonoscopy and possible ostomy formation, though it is high risk.  Patient was unsure about whether or not she wants surgery.      We met the patient at bedside again today. She denied any pain, SOB, or other symptoms at this time.  She was called and was told by her outside oncologist that she should go for surgery if she wants to proceed with immunotherapy . She agreed to proceed. Colorectal surgery team informed, and are trying to schedule procedure tomorrow or Monday.  We also discussed her social situation . She lives by herself in her own house . She has no children , but has good support from her nephews and nieces . She states that her niece Temitope Green is her healthcare proxy and would like to fill some papers for that.     Morphine Equivalent Daily Dose (MEDD):  0mg    Recommendations:  - Full code, ongoing medical management per primary team  - Surgical intervention to be scheduled by surgical team   - continue PRN morphine per primary team for pain management (hold for sedation, confusion, RR<10)  - HCP filled out with patient naming her niece, Temitope Green, her HCP; patient has no children  - Patient agreed to have the palliative team reach out to the HCP to give a medical update  - Palliative care will continue to follow and be available for GOC discussions as appropriate.    Please Call [phone] PRN

## 2020-07-16 NOTE — PROGRESS NOTE ADULT - ASSESSMENT
74y Female patient admitted for colovesicular fistula. Patient being seen by nephrology for R ureter obstruction, with nothing to do on their end. Patient is poor surgical candidate, but will follow for need of diverting ostomy.        Plan    - Continue Pain control  - Continue antibiotics  - OOB as tolerated  - Continue Incentive Spirometry  - f/u vitals, monitor blood pressure and heart rate**  - f/u labs & replete as necessary  - DVT PPX  - GI PPX

## 2020-07-16 NOTE — PROGRESS NOTE ADULT - SUBJECTIVE AND OBJECTIVE BOX
Patient is a 74y old  Female who presents with a chief complaint of abdominal pain (16 Jul 2020 11:11)      Subjective: The patient denies any shortness of breath , no pain . She was called by her oncologist and was told to proceed with surgery if she wants to receive immunotherapy      Vital Signs Last 24 Hrs  T(C): 37 (16 Jul 2020 07:35), Max: 37 (16 Jul 2020 07:35)  T(F): 98.6 (16 Jul 2020 07:35), Max: 98.6 (16 Jul 2020 07:35)  HR: 113 (16 Jul 2020 10:20) (107 - 119)  BP: 120/53 (16 Jul 2020 10:20) (93/50 - 120/53)  BP(mean): --  RR: 19 (16 Jul 2020 07:35) (18 - 19)  SpO2: --        MEDICATIONS  (STANDING):  apixaban 5 milliGRAM(s) Oral every 12 hours  cefepime   IVPB      cefepime   IVPB 2000 milliGRAM(s) IV Intermittent every 12 hours  chlorhexidine 4% Liquid 1 Application(s) Topical <User Schedule>  lactated ringers. 1000 milliLiter(s) (100 mL/Hr) IV Continuous <Continuous>  latanoprost 0.005% Ophthalmic Solution 1 Drop(s) Both EYES at bedtime  magnesium oxide 400 milliGRAM(s) Oral three times a day with meals  metoprolol succinate ER 25 milliGRAM(s) Oral daily  montelukast 10 milliGRAM(s) Oral daily  saccharomyces boulardii 250 milliGRAM(s) Oral two times a day  simvastatin 20 milliGRAM(s) Oral at bedtime  tamoxifen 20 milliGRAM(s) Oral daily    MEDICATIONS  (PRN):  ALBUTerol    90 MICROgram(s) HFA Inhaler 2 Puff(s) Inhalation every 6 hours PRN Bronchospasm  diphenhydrAMINE 25 milliGRAM(s) Oral every 4 hours PRN Rash and/or Itching  morphine  - Injectable 2 milliGRAM(s) IV Push every 4 hours PRN Severe Pain (7 - 10)      LABS:                          7.8    9.45  )-----------( 213      ( 15 Jul 2020 07:26 )             24.8         Mean Cell Volume : 81.0 fL  Mean Cell Hemoglobin : 25.5 pg  Mean Cell Hemoglobin Concentration : 31.5 g/dL  Auto Neutrophil # : 8.31 K/uL  Auto Lymphocyte # : 0.60 K/uL  Auto Monocyte # : 0.35 K/uL  Auto Eosinophil # : 0.07 K/uL  Auto Basophil # : 0.07 K/uL  Auto Neutrophil % : 88.1 %  Auto Lymphocyte % : 6.3 %  Auto Monocyte % : 3.7 %  Auto Eosinophil % : 0.7 %  Auto Basophil % : 0.7 %      Serial CBC's  07-15 @ 07:26  Hct-24.8 / Hgb-7.8 / Plat-213 / RBC-3.06 / WBC-9.45  Serial CBC's  07-14 @ 11:31  Hct-26.9 / Hgb-8.1 / Plat-252 / RBC-3.32 / WBC-11.70  Serial CBC's  07-13 @ 11:53  Hct-23.7 / Hgb-7.3 / Plat-206 / RBC-2.96 / WBC-11.16  Serial CBC's  07-12 @ 18:08  Hct-24.4 / Hgb-7.5 / Plat-293 / RBC-3.00 / WBC-10.89      07-15    136  |  104  |  13  ----------------------------<  104<H>  4.2   |  18  |  1.1    Ca    7.4<L>      15 Jul 2020 07:26  Mg     2.1     07-15    TPro  5.7<L>  /  Alb  2.2<L>  /  TBili  0.4  /  DBili  x   /  AST  13  /  ALT  9   /  AlkPhos  122<H>  07-14          Ferritin, Serum: 844 ng/mL (07-12 @ 18:08)  Iron - Total Binding Capacity.: 102 ug/dL (07-12 @ 18:08)                    Culture - Blood (collected 13 Jul 2020 15:44)  Source: .Blood Blood  Preliminary Report (14 Jul 2020 23:44):    No growth to date. Patient is a 74y old  Female who presents with a chief complaint of abdominal pain (16 Jul 2020 11:11)      Subjective: The patient denies any shortness of breath , no pain . She was called by her oncologist and was told to proceed with surgery if she wants to receive immunotherapy      Vital Signs Last 24 Hrs  T(C): 37 (16 Jul 2020 07:35), Max: 37 (16 Jul 2020 07:35)  T(F): 98.6 (16 Jul 2020 07:35), Max: 98.6 (16 Jul 2020 07:35)  HR: 113 (16 Jul 2020 10:20) (107 - 119)  BP: 120/53 (16 Jul 2020 10:20) (93/50 - 120/53)  BP(mean): --  RR: 19 (16 Jul 2020 07:35) (18 - 19)  SpO2: --          MEDICATIONS  (STANDING):  apixaban 5 milliGRAM(s) Oral every 12 hours  cefepime   IVPB      cefepime   IVPB 2000 milliGRAM(s) IV Intermittent every 12 hours  chlorhexidine 4% Liquid 1 Application(s) Topical <User Schedule>  lactated ringers. 1000 milliLiter(s) (100 mL/Hr) IV Continuous <Continuous>  latanoprost 0.005% Ophthalmic Solution 1 Drop(s) Both EYES at bedtime  magnesium oxide 400 milliGRAM(s) Oral three times a day with meals  metoprolol succinate ER 25 milliGRAM(s) Oral daily  montelukast 10 milliGRAM(s) Oral daily  saccharomyces boulardii 250 milliGRAM(s) Oral two times a day  simvastatin 20 milliGRAM(s) Oral at bedtime  tamoxifen 20 milliGRAM(s) Oral daily    MEDICATIONS  (PRN):  ALBUTerol    90 MICROgram(s) HFA Inhaler 2 Puff(s) Inhalation every 6 hours PRN Bronchospasm  diphenhydrAMINE 25 milliGRAM(s) Oral every 4 hours PRN Rash and/or Itching  morphine  - Injectable 2 milliGRAM(s) IV Push every 4 hours PRN Severe Pain (7 - 10)      LABS:                          7.8    9.45  )-----------( 213      ( 15 Jul 2020 07:26 )             24.8         Mean Cell Volume : 81.0 fL  Mean Cell Hemoglobin : 25.5 pg  Mean Cell Hemoglobin Concentration : 31.5 g/dL  Auto Neutrophil # : 8.31 K/uL  Auto Lymphocyte # : 0.60 K/uL  Auto Monocyte # : 0.35 K/uL  Auto Eosinophil # : 0.07 K/uL  Auto Basophil # : 0.07 K/uL  Auto Neutrophil % : 88.1 %  Auto Lymphocyte % : 6.3 %  Auto Monocyte % : 3.7 %  Auto Eosinophil % : 0.7 %  Auto Basophil % : 0.7 %      Serial CBC's  07-15 @ 07:26  Hct-24.8 / Hgb-7.8 / Plat-213 / RBC-3.06 / WBC-9.45  Serial CBC's  07-14 @ 11:31  Hct-26.9 / Hgb-8.1 / Plat-252 / RBC-3.32 / WBC-11.70  Serial CBC's  07-13 @ 11:53  Hct-23.7 / Hgb-7.3 / Plat-206 / RBC-2.96 / WBC-11.16  Serial CBC's  07-12 @ 18:08  Hct-24.4 / Hgb-7.5 / Plat-293 / RBC-3.00 / WBC-10.89    07-15    136  |  104  |  13  ----------------------------<  104<H>  4.2   |  18  |  1.1    Ca    7.4<L>      15 Jul 2020 07:26  Mg     2.1     07-15    TPro  5.7<L>  /  Alb  2.2<L>  /  TBili  0.4  /  DBili  x   /  AST  13  /  ALT  9   /  AlkPhos  122<H>  07-14    Ferritin, Serum: 844 ng/mL (07-12 @ 18:08)  Iron - Total Binding Capacity.: 102 ug/dL (07-12 @ 18:08)    Culture - Blood (collected 13 Jul 2020 15:44)  Source: .Blood Blood  Preliminary Report (14 Jul 2020 23:44):    No growth to date.

## 2020-07-16 NOTE — CHART NOTE - NSCHARTNOTEFT_GEN_A_CORE
Spoke with GI today, patient is planned for colonoscopy on Monday:  Tomorrow Friday : pt can take Eliquis dose in the morning and then stop it after.   Sunday : patient should be on clear liquids  Dulcolax 20 mg tabs at 4 pm and 10 pm.   Negro solution 4 liters on Sunday and NPO after midnight. Spoke with GI today, patient is planned for colonoscopy on Monday:  Tomorrow Friday : pt can take Eliquis dose in the morning and then stop it after. and start Lovenox 70 mg SC BID, last dose on Sunday evening  Sunday : patient should be on clear liquids  Dulcolax 20 mg tabs at  10 pm.   Negro solution 4 liters on Sunday and NPO after midnight.

## 2020-07-16 NOTE — PROGRESS NOTE ADULT - SUBJECTIVE AND OBJECTIVE BOX
Hospital Day:  5d    Patient is a 74y old  Female who presents with a chief complaint of 5d    Subjective:  pt seen and exmained at bedside.   Reports improvement in her abdominal pain  Past Medical Hx:   Osteoporosis  Obesity  COPD (chronic obstructive pulmonary disease)  Asthma  Diabetes  Hypertension  Breast cancer  Cervical cancer    Past Sx:  History of appendectomy  History of lumpectomy of left breast    Allergies:  iodinated radiocontrast agents (Other)  penicillin (Unknown)    Current Meds:   Standng Meds:  apixaban 5 milliGRAM(s) Oral every 12 hours  cefepime   IVPB      cefepime   IVPB 2000 milliGRAM(s) IV Intermittent every 12 hours  chlorhexidine 4% Liquid 1 Application(s) Topical <User Schedule>  lactated ringers. 1000 milliLiter(s) (100 mL/Hr) IV Continuous <Continuous>  latanoprost 0.005% Ophthalmic Solution 1 Drop(s) Both EYES at bedtime  magnesium oxide 400 milliGRAM(s) Oral three times a day with meals  metoprolol succinate ER 25 milliGRAM(s) Oral daily  montelukast 10 milliGRAM(s) Oral daily  saccharomyces boulardii 250 milliGRAM(s) Oral two times a day  simvastatin 20 milliGRAM(s) Oral at bedtime  tamoxifen 20 milliGRAM(s) Oral daily    PRN Meds:  ALBUTerol    90 MICROgram(s) HFA Inhaler 2 Puff(s) Inhalation every 6 hours PRN Bronchospasm  diphenhydrAMINE 25 milliGRAM(s) Oral every 4 hours PRN Rash and/or Itching  morphine  - Injectable 2 milliGRAM(s) IV Push every 4 hours PRN Severe Pain (7 - 10)    HOME MEDICATIONS:  Advair Diskus 100 mcg-50 mcg inhalation powder: 1 puff(s) inhaled 2 times a day  albuterol 90 mcg/inh inhalation aerosol: 2 puff(s) inhaled 4 times a day, As Needed  alendronate weekly: 70 milligram(s) orally once a week (wednesday)  aspirin 81 mg oral tablet: 1 tab(s) orally once a day  Eliquis 5 mg oral tablet: 1 tab(s) orally 2 times a day  enalapril 10 mg oral tablet: 1 tab(s) orally once a day  latanoprost 0.005% ophthalmic solution: 1 drop(s) to each affected eye once a day (in the evening)  megestrol 20 mg oral tablet: 1 tab(s) orally 4 times a day  metoprolol succinate 25 mg oral tablet, extended release: 1 tab(s) orally once a day  montelukast 10 mg oral tablet: 1 tab(s) orally once a day  simvastatin 20 mg oral tablet: 1 tab(s) orally once a day (at bedtime)  tamoxifen 20 mg oral tablet: 1 tab(s) orally once a day      Vital Signs:   T(F): 98.6 (07-16-20 @ 07:35), Max: 98.6 (07-16-20 @ 07:35)  HR: 113 (07-16-20 @ 10:20) (107 - 119)  BP: 120/53 (07-16-20 @ 10:20) (93/50 - 120/53)  RR: 19 (07-16-20 @ 07:35) (18 - 19)  SpO2: --      07-15-20 @ 07:01  -  07-16-20 @ 07:00  --------------------------------------------------------  IN: 0 mL / OUT: 50 mL / NET: -50 mL    07-16-20 @ 07:01  -  07-16-20 @ 10:44  --------------------------------------------------------  IN: 400 mL / OUT: 0 mL / NET: 400 mL        Physical Exam:   GENERAL: NAD  HEENT: NCAT  CHEST/LUNG: CTAB  HEART: Regular rate and rhythm; s1 s2 appreciated, No murmurs, rubs, or gallops  ABDOMEN: Soft, Nontender, Nondistended; Bowel sounds present  EXTREMITIES: No LE edema b/l  SKIN: no rashes, no new lesions  NERVOUS SYSTEM:  Alert & Oriented X3  LINES/CATHETERS:        Labs:                         7.8    9.45  )-----------( 213      ( 15 Jul 2020 07:26 )             24.8       15 Jul 2020 07:26    136    |  104    |  13     ----------------------------<  104    4.2     |  18     |  1.1      Ca    7.4        15 Jul 2020 07:26  Mg     2.1       15 Jul 2020 07:26    TPro  5.7    /  Alb  2.2    /  TBili  0.4    /  DBili  x      /  AST  13     /  ALT  9      /  AlkPhos  122    14 Jul 2020 11:31        Amylase --, Lipase 62, 07-11-20 @ 15:55    Culture - Blood (collected 07-13-20 @ 15:44)  Source: .Blood Blood  Preliminary Report (07-14-20 @ 23:44):    No growth to date.      Radiology:       Assessment and Plan:   #RLQ/suprapubic pain 2/2 obstructive pelvic mass  #Right sided hydronephrosis 2/2 pelvic mass with necrosis   #Colovesical fistula   -necrotic pelvic mass seen on imaging from [facility] with possible evidence of fistulization to the cecum (records in chart)Culture   -catherine in place draining what appears to be fecal matter   -uro and gen surg eval noted   -seen by IR no perc nephrostomy recommended   -cont pain control  -f/u bcx and urine culture : Results:   >=3 organisms. Probable collection contamination. (07.11.20 @ 21:20)  will repeat Urine culture today.  - C. diff not detected  -maintain catherine at this time  -pt cannot have contrast studies given iodine allergy she states when she had contrast in the past she needed to be resuscitated given her adverse rxn   -F/U palliative consult to further speak with pt given her overall prognosis. Pt is still reluctant on making a decision of possible ostomy, colonoscopy  Patient is undecided on whether she wants to pursue surgery. She does not want hospice even though she understands her disease is advanced and terminal. She wants to remain full code. Palliative consult pending. Will attempt to set up family meeting.   - F/U GI today for possible colonoscopy , in case patient decides to go through with the surgery.     #leukocytosis, possible pyelonephritis  possibly 2/2 necrotic pelvic mass vs pyelo  send bcx   c/w empiric antibiotics (PCN allergy per pt when she was very young and she does not recall rxn)  ID consult-->change to cefepime 2g q12h and repeat urine culture   monitor fever curve  daily CBC    #chronic anemia likely 2/2 iron deficiency vs. hematuria  cont iron supplementation  trend cbc transfuse if hgb <7  maintain active type and screen   monitor for overt bleeding  -Eliquis was held on admission for hematuria, but resumed today    #cervical/uterine/breast cancer history  cont tamoxifen, megestrol  pt to follow with outpt onc on discharge      #hx of right LE DVT held Eliquis on admission at this time given hematuria and drop in hgb   - resumed Eliquis 5 BID today  - F/U cbc    FULL code poor overall prognosis   Activity As tolerated  Diet: regular  COde: Full  Dispo: Acute for now Hospital Day:  5d    Patient is a 74y old  Female who presents with a chief complaint of 5d    Subjective:  pt seen and exmained at bedside.   Reports improvement in her abdominal pain but is still having watery diarrhea.   Otherwise , she's stable clinically.     Past Medical Hx:   Osteoporosis  Obesity  COPD (chronic obstructive pulmonary disease)  Asthma  Diabetes  Hypertension  Breast cancer  Cervical cancer    Past Sx:  History of appendectomy  History of lumpectomy of left breast    Allergies:  iodinated radiocontrast agents (Other)  penicillin (Unknown)    Current Meds:   Standng Meds:  apixaban 5 milliGRAM(s) Oral every 12 hours  cefepime   IVPB      cefepime   IVPB 2000 milliGRAM(s) IV Intermittent every 12 hours  chlorhexidine 4% Liquid 1 Application(s) Topical <User Schedule>  lactated ringers. 1000 milliLiter(s) (100 mL/Hr) IV Continuous <Continuous>  latanoprost 0.005% Ophthalmic Solution 1 Drop(s) Both EYES at bedtime  magnesium oxide 400 milliGRAM(s) Oral three times a day with meals  metoprolol succinate ER 25 milliGRAM(s) Oral daily  montelukast 10 milliGRAM(s) Oral daily  saccharomyces boulardii 250 milliGRAM(s) Oral two times a day  simvastatin 20 milliGRAM(s) Oral at bedtime  tamoxifen 20 milliGRAM(s) Oral daily    PRN Meds:  ALBUTerol    90 MICROgram(s) HFA Inhaler 2 Puff(s) Inhalation every 6 hours PRN Bronchospasm  diphenhydrAMINE 25 milliGRAM(s) Oral every 4 hours PRN Rash and/or Itching  morphine  - Injectable 2 milliGRAM(s) IV Push every 4 hours PRN Severe Pain (7 - 10)    HOME MEDICATIONS:  Advair Diskus 100 mcg-50 mcg inhalation powder: 1 puff(s) inhaled 2 times a day  albuterol 90 mcg/inh inhalation aerosol: 2 puff(s) inhaled 4 times a day, As Needed  alendronate weekly: 70 milligram(s) orally once a week (wednesday)  aspirin 81 mg oral tablet: 1 tab(s) orally once a day  Eliquis 5 mg oral tablet: 1 tab(s) orally 2 times a day  enalapril 10 mg oral tablet: 1 tab(s) orally once a day  latanoprost 0.005% ophthalmic solution: 1 drop(s) to each affected eye once a day (in the evening)  megestrol 20 mg oral tablet: 1 tab(s) orally 4 times a day  metoprolol succinate 25 mg oral tablet, extended release: 1 tab(s) orally once a day  montelukast 10 mg oral tablet: 1 tab(s) orally once a day  simvastatin 20 mg oral tablet: 1 tab(s) orally once a day (at bedtime)  tamoxifen 20 mg oral tablet: 1 tab(s) orally once a day      Vital Signs:   T(F): 98.6 (07-16-20 @ 07:35), Max: 98.6 (07-16-20 @ 07:35)  HR: 113 (07-16-20 @ 10:20) (107 - 119)  BP: 120/53 (07-16-20 @ 10:20) (93/50 - 120/53)  RR: 19 (07-16-20 @ 07:35) (18 - 19)  SpO2: --      07-15-20 @ 07:01  -  07-16-20 @ 07:00  --------------------------------------------------------  IN: 0 mL / OUT: 50 mL / NET: -50 mL    07-16-20 @ 07:01  -  07-16-20 @ 10:44  --------------------------------------------------------  IN: 400 mL / OUT: 0 mL / NET: 400 mL        Physical Exam:   GENERAL: NAD,  HEENT: NCAT  CHEST/LUNG: CTAB  HEART: Regular rate and rhythm; s1 s2 appreciated, No murmurs, rubs, or gallops  ABDOMEN: Soft, Nontender, Nondistended; Bowel sounds present  EXTREMITIES: No LE edema b/l  SKIN: no rashes, no new lesions  NERVOUS SYSTEM:  Alert & Oriented X3  LINES/CATHETERS: Catherine cath in place      Labs:                         7.8    9.45  )-----------( 213      ( 15 Jul 2020 07:26 )             24.8       15 Jul 2020 07:26    136    |  104    |  13     ----------------------------<  104    4.2     |  18     |  1.1      Ca    7.4        15 Jul 2020 07:26  Mg     2.1       15 Jul 2020 07:26    TPro  5.7    /  Alb  2.2    /  TBili  0.4    /  DBili  x      /  AST  13     /  ALT  9      /  AlkPhos  122    14 Jul 2020 11:31        Amylase --, Lipase 62, 07-11-20 @ 15:55    Culture - Blood (collected 07-13-20 @ 15:44)  Source: .Blood Blood  Preliminary Report (07-14-20 @ 23:44):    No growth to date.      Radiology:       Assessment and Plan:       #RLQ/suprapubic pain 2/2 obstructive pelvic mass  #Right sided hydronephrosis 2/2 pelvic mass with necrosis   #Colovesical fistula   -necrotic pelvic mass seen on imaging from [facility] with possible evidence of fistulization to the cecum (records in chart)Culture   -catherine in place draining what appears to be fecal matter   -uro and gen surg eval noted , possible diversion Ostomy  -seen by IR no perc nephrostomy recommended   -cont pain control  -f/u bcx and urine culture : Results:   >=3 organisms. Probable collection contamination. (07.11.20 @ 21:20)  F/U repeat Urine culture.   - C. diff not detected  -maintain catherine at this time  -pt cannot have contrast studies given iodine allergy she states when she had contrast in the past she needed to be resuscitated given her adverse rxn   -F/U palliative consult to further speak with pt given her overall prognosis. Pt is still reluctant on making a decision of possible ostomy, colonoscopy  Patient is undecided on whether she wants to pursue surgery. She does not want hospice even though she understands her disease is advanced and terminal. She wants to remain full code. Palliative consult pending. Will attempt to set up family meeting.   - F/U GI today for possible colonoscopy , in case patient decides to go through with the surgery.     #leukocytosis, possible pyelonephritis  possibly 2/2 necrotic pelvic mass vs pyelo  send bcx   c/w empiric antibiotics (PCN allergy per pt when she was very young and she does not recall rxn)  ID consult-->change to cefepime 2g q12h and repeat urine culture   monitor fever curve  daily CBC    #chronic anemia likely 2/2 iron deficiency vs. hematuria  cont iron supplementation  trend cbc transfuse if hgb <7  maintain active type and screen   monitor for overt bleeding  -Eliquis was held on admission for hematuria, but resumed today    #cervical/uterine/breast cancer history  cont tamoxifen, megestrol  pt to follow with outpt onc on discharge      #hx of right LE DVT held Eliquis on admission at this time given hematuria and drop in hgb   - resumed Eliquis 5 BID today  - F/U cbc    FULL code poor overall prognosis   Activity As tolerated  Diet: regular  COde: Full  Dispo: Acute for now    F/U Colorectal surgery, GI, palliative, oncologist ( private : Jefferson Fan) Hospital Day:  5d    Patient is a 74y old  Female who presents with a chief complaint of 5d    Subjective:  pt seen and exmained at bedside.   Reports improvement in her abdominal pain but is still having watery diarrhea.   Otherwise , she's stable clinically.     Past Medical Hx:   Osteoporosis  Obesity  COPD (chronic obstructive pulmonary disease)  Asthma  Diabetes  Hypertension  Breast cancer  Cervical cancer    Past Sx:  History of appendectomy  History of lumpectomy of left breast    Allergies:  iodinated radiocontrast agents (Other)  penicillin (Unknown)    Current Meds:   Standng Meds:  apixaban 5 milliGRAM(s) Oral every 12 hours  cefepime   IVPB      cefepime   IVPB 2000 milliGRAM(s) IV Intermittent every 12 hours  chlorhexidine 4% Liquid 1 Application(s) Topical <User Schedule>  lactated ringers. 1000 milliLiter(s) (100 mL/Hr) IV Continuous <Continuous>  latanoprost 0.005% Ophthalmic Solution 1 Drop(s) Both EYES at bedtime  magnesium oxide 400 milliGRAM(s) Oral three times a day with meals  metoprolol succinate ER 25 milliGRAM(s) Oral daily  montelukast 10 milliGRAM(s) Oral daily  saccharomyces boulardii 250 milliGRAM(s) Oral two times a day  simvastatin 20 milliGRAM(s) Oral at bedtime  tamoxifen 20 milliGRAM(s) Oral daily    PRN Meds:  ALBUTerol    90 MICROgram(s) HFA Inhaler 2 Puff(s) Inhalation every 6 hours PRN Bronchospasm  diphenhydrAMINE 25 milliGRAM(s) Oral every 4 hours PRN Rash and/or Itching  morphine  - Injectable 2 milliGRAM(s) IV Push every 4 hours PRN Severe Pain (7 - 10)    HOME MEDICATIONS:  Advair Diskus 100 mcg-50 mcg inhalation powder: 1 puff(s) inhaled 2 times a day  albuterol 90 mcg/inh inhalation aerosol: 2 puff(s) inhaled 4 times a day, As Needed  alendronate weekly: 70 milligram(s) orally once a week (wednesday)  aspirin 81 mg oral tablet: 1 tab(s) orally once a day  Eliquis 5 mg oral tablet: 1 tab(s) orally 2 times a day  enalapril 10 mg oral tablet: 1 tab(s) orally once a day  latanoprost 0.005% ophthalmic solution: 1 drop(s) to each affected eye once a day (in the evening)  megestrol 20 mg oral tablet: 1 tab(s) orally 4 times a day  metoprolol succinate 25 mg oral tablet, extended release: 1 tab(s) orally once a day  montelukast 10 mg oral tablet: 1 tab(s) orally once a day  simvastatin 20 mg oral tablet: 1 tab(s) orally once a day (at bedtime)  tamoxifen 20 mg oral tablet: 1 tab(s) orally once a day      Vital Signs:   T(F): 98.6 (07-16-20 @ 07:35), Max: 98.6 (07-16-20 @ 07:35)  HR: 113 (07-16-20 @ 10:20) (107 - 119)  BP: 120/53 (07-16-20 @ 10:20) (93/50 - 120/53)  RR: 19 (07-16-20 @ 07:35) (18 - 19)  SpO2: --      07-15-20 @ 07:01  -  07-16-20 @ 07:00  --------------------------------------------------------  IN: 0 mL / OUT: 50 mL / NET: -50 mL    07-16-20 @ 07:01  -  07-16-20 @ 10:44  --------------------------------------------------------  IN: 400 mL / OUT: 0 mL / NET: 400 mL        Physical Exam:   GENERAL: NAD,  HEENT: NCAT  CHEST/LUNG: CTAB  HEART: Regular rate and rhythm; s1 s2 appreciated, No murmurs, rubs, or gallops  ABDOMEN: Soft, Nontender, Nondistended; Bowel sounds present  EXTREMITIES: No LE edema b/l  SKIN: no rashes, no new lesions  NERVOUS SYSTEM:  Alert & Oriented X3  LINES/CATHETERS: Catherine cath in place      Labs:                         7.8    9.45  )-----------( 213      ( 15 Jul 2020 07:26 )             24.8       15 Jul 2020 07:26    136    |  104    |  13     ----------------------------<  104    4.2     |  18     |  1.1      Ca    7.4        15 Jul 2020 07:26  Mg     2.1       15 Jul 2020 07:26    TPro  5.7    /  Alb  2.2    /  TBili  0.4    /  DBili  x      /  AST  13     /  ALT  9      /  AlkPhos  122    14 Jul 2020 11:31        Amylase --, Lipase 62, 07-11-20 @ 15:55    Culture - Blood (collected 07-13-20 @ 15:44)  Source: .Blood Blood  Preliminary Report (07-14-20 @ 23:44):    No growth to date.      Radiology:       Assessment and Plan:   #RLQ/suprapubic pain 2/2 obstructive pelvic mass  #Right sided hydronephrosis 2/2 pelvic mass with necrosis   #Colovesical fistula   -necrotic pelvic mass seen on imaging from [facility] with possible evidence of fistulization to the cecum (records in chart)Culture   -catherine in place draining what appears to be fecal matter   -uro and gen surg eval noted , possible diversion Ostomy  -seen by IR no perc nephrostomy recommended   -cont pain control  -f/u bcx and urine culture : Results:   >=3 organisms. Probable collection contamination. (07.11.20 @ 21:20)  F/U repeat Urine culture.   - C. diff not detected  -maintain catherine at this time  -pt cannot have contrast studies given iodine allergy she states when she had contrast in the past she needed to be resuscitated given her adverse rxn   -F/U palliative consult to further speak with pt given her overall prognosis. Pt is still reluctant on making a decision of possible ostomy, colonoscopy  Patient is undecided on whether she wants to pursue surgery. She does not want hospice even though she understands her disease is advanced and terminal. She wants to remain full code. Palliative consult pending. Will attempt to set up family meeting.   - F/U GI today for possible colonoscopy , in case patient decides to go through with the surgery.     #leukocytosis, possible pyelonephritis  possibly 2/2 necrotic pelvic mass vs pyelo  send bcx   c/w empiric antibiotics (PCN allergy per pt when she was very young and she does not recall rxn)  ID consult-->change to cefepime 2g q12h and repeat urine culture   monitor fever curve  daily CBC    #chronic anemia likely 2/2 iron deficiency vs. hematuria  cont iron supplementation  trend cbc transfuse if hgb <7  maintain active type and screen   monitor for overt bleeding  -Eliquis was held on admission for hematuria, but resumed today    #cervical/uterine/breast cancer history  cont tamoxifen, megestrol  pt to follow with outpt onc on discharge      #hx of right LE DVT held Eliquis on admission at this time given hematuria and drop in hgb   - resumed Eliquis 5 BID today  - F/U cbc    FULL code poor overall prognosis   Activity As tolerated  Diet: regular  COde: Full  Dispo: Acute for now    F/U Colorectal surgery, GI, palliative, oncologist ( private : Jefferson Fan)

## 2020-07-16 NOTE — PROGRESS NOTE ADULT - SUBJECTIVE AND OBJECTIVE BOX
Progress Note: General Surgery  Patient: RILEY MARIE , 74y (1945)Female   MRN: [id number]  Location: [address]  Visit: 07-11-20 Inpatient  Date: 07-16-20 @ 10:03    Procedure/Diagnosis: Colovesicular fistula      Events/ 24h: patient has experienced tachycardia and low blood pressure, patient is treated with 25mg metoprolol, still experiencing loose stool. Pain well controlled.    Vitals: T(F): 98.6 (07-16-20 @ 07:35), Max: 98.6 (07-16-20 @ 07:35)  HR: 107 (07-16-20 @ 07:35)  BP: 100/58 (07-16-20 @ 07:35) (93/50 - 100/58)  RR: 19 (07-16-20 @ 07:35)  SpO2: --    In:   07-15-20 @ 07:01  -  07-16-20 @ 07:00  --------------------------------------------------------  IN: 0 mL      Out:   07-15-20 @ 07:01  -  07-16-20 @ 07:00  --------------------------------------------------------  OUT:    Voided: 50 mL  Total OUT: 50 mL        Net:   07-15-20 @ 07:01  -  07-16-20 @ 07:00  --------------------------------------------------------  NET: -50 mL        Diet: Diet, Consistent Carbohydrate/No Snacks:   DASH/TLC Sodium & Cholesterol Restricted  Prosource Gelatein 20 Sugar Free     Qty per Day:  2 (07-13-20 @ 13:18)    IV Fluids: lactated ringers. 1000 milliLiter(s) (100 mL/Hr) IV Continuous <Continuous>  magnesium oxide 400 milliGRAM(s) Oral three times a day with meals      Physical Examination:  General Appearance: NAD  HEENT: EOMI, sclera non-icteric.  Heart: RRR   Lungs: CTABL.   Abdomen: mild tender. no guarding  MSK/Extremities: Warm & well-perfused.   Skin: Warm, dry. No jaundice.       Medications: [Standing]  apixaban 5 milliGRAM(s) Oral every 12 hours  cefepime   IVPB      cefepime   IVPB 2000 milliGRAM(s) IV Intermittent every 12 hours  chlorhexidine 4% Liquid 1 Application(s) Topical <User Schedule>  lactated ringers. 1000 milliLiter(s) (100 mL/Hr) IV Continuous <Continuous>  latanoprost 0.005% Ophthalmic Solution 1 Drop(s) Both EYES at bedtime  magnesium oxide 400 milliGRAM(s) Oral three times a day with meals  metoprolol succinate ER 25 milliGRAM(s) Oral daily  montelukast 10 milliGRAM(s) Oral daily  saccharomyces boulardii 250 milliGRAM(s) Oral two times a day  simvastatin 20 milliGRAM(s) Oral at bedtime  tamoxifen 20 milliGRAM(s) Oral daily    DVT Prophylaxis:   GI Prophylaxis:   Antibiotics: cefepime   IVPB      cefepime   IVPB 2000 milliGRAM(s) IV Intermittent every 12 hours    Anticoagulation:   Medications:[PRN]  ALBUTerol    90 MICROgram(s) HFA Inhaler 2 Puff(s) Inhalation every 6 hours PRN  diphenhydrAMINE 25 milliGRAM(s) Oral every 4 hours PRN  morphine  - Injectable 2 milliGRAM(s) IV Push every 4 hours PRN      Labs:                        7.8    9.45  )-----------( 213      ( 15 Jul 2020 07:26 )             24.8     07-15    136  |  104  |  13  ----------------------------<  104<H>  4.2   |  18  |  1.1    Ca    7.4<L>      15 Jul 2020 07:26  Mg     2.1     07-15    TPro  5.7<L>  /  Alb  2.2<L>  /  TBili  0.4  /  DBili  x   /  AST  13  /  ALT  9   /  AlkPhos  122<H>  07-14    LIVER FUNCTIONS - ( 14 Jul 2020 11:31 )  Alb: 2.2 g/dL / Pro: 5.7 g/dL / ALK PHOS: 122 U/L / ALT: 9 U/L / AST: 13 U/L / GGT: x             Culture - Blood (collected 13 Jul 2020 15:44)  Source: .Blood Blood  Preliminary Report (14 Jul 2020 23:44):    No growth to date.          Imaging:     no new images      Date/Time: 07-16-20 @ 10:03

## 2020-07-16 NOTE — CONSULT NOTE ADULT - ASSESSMENT
The patient is a 74 year-old female with a PMH of cervical cancer s/p radiation therapy 20 years ago, breast cancer s/p lumpectomy and radiation therapy 6 years ago, uterine cancer s/p carboplatin/paclitaxel 3 years ago followed by megestrol/tamoxifen by Dr. Taco Smith ([phone]) w/ metastases to bone, COPD (not on home O2), HTN, DM, CKD III, and DVT on Eliquis presenting for right sided abdominal pain and diarrhea, admitted for further workup.    # Colovesical fistula on new CT abd/pelvis contiguous w/ previously known pelvis mass, decreased in size    Patient w/ feculent material draining from catherine catheter; afebrile, no leukocytosis  Surgery planning to do a diverting ileostomy for healing of the fistula  Give last dose of Eliquis tomorrow morning  Clear liquid diet Sunday (no red dye); GoLytely beginning 4 PM Sunday followed by Dulcolax 5 mg x 4 doses PO Sunday night  NPO after midnight Sunday into Monday  Plan for colonoscopy Monday, 7/20/20    Will follow.    *** Note incomplete, pending attending's revisions/additions. *** The patient is a 74 year-old female with a PMH of cervical cancer s/p radiation therapy 20 years ago, breast cancer s/p lumpectomy and radiation therapy 6 years ago, uterine cancer s/p carboplatin/paclitaxel 3 years ago followed by megestrol/tamoxifen by Dr. Taco Smith ([phone]) w/ metastases to bone, COPD (not on home O2), HTN, DM, CKD III, and DVT on Eliquis presenting for right sided abdominal pain and diarrhea, admitted for further workup.    # Colovesical fistula on new CT abd/pelvis contiguous w/ previously known pelvis mass, decreased in size    Patient w/ feculent material draining from catherine catheter; afebrile, no leukocytosis  Surgery planning to do a diverting ileostomy   Give last dose of Eliquis tomorrow morning  Clear liquid diet Sunday (no red dye); GoLytely beginning 4 PM Sunday followed by Dulcolax 5 mg x 4 doses PO Sunday night  NPO after midnight Sunday into Monday  Plan for colonoscopy Monday, 7/20/20    If patient develops symptoms of SBO or ileus (currently she is not) please notify GI as this would change our recommendations.

## 2020-07-16 NOTE — CHART NOTE - NSCHARTNOTEFT_GEN_A_CORE
CASSIE met with pt. at bedside with Palliative Care team.  Palliative care was reintroduced to pt. who was alert and oriented x4 at time of visit.  Pt. engaged easily in conversation.  Pt. advised team that she wished to complete HCP naming her niece Temitope Green as her proxy.  HCP completed and copy given to patient.  Will continue to follow for support.

## 2020-07-16 NOTE — CHART NOTE - NSCHARTNOTEFT_GEN_A_CORE
Registered Dietitian Follow-Up     Patient Profile Reviewed                           Yes [x]   No []     Nutrition History Previously Obtained        Yes [x]  No []       Pertinent Subjective Information:      Pertinent Medical Interventions:  1. Per palliative c/s, pt did not want to consider and discuss hospice at this time. Undecided on whether she wants to pursue surgery(diverting ostomy). Waiting to hear back from her primary oncologist before making a decision.      Diet order: Consistent carbohydrate (no snacks), DASH/TLC + Prosource gelatin sugar-free x2/day      Anthropometrics:  - Ht. 67"  - Wt. 147.9lbs; standing (7/12) - no new wts in EMR  - %wt change  - BMI 23.2 -- based on 147.9lbs  - IBW 135lbs     Pertinent Lab Data: (7/15) H/H 7.8/24.8, , eGFR 50, A1c 5.4 (7/12)      Pertinent Meds: eliquis, abx, lctated ringers @ 100ml/hr, mag-ox, simvastatin      Physical Findings:  - Appearance: No edema noted per RN flow sheets  - GI function: Last BM (7/16)  - Tubes: N/A  - Oral/Mouth cavity:  - Skin: Per WOCN, IAD b/l buttock-intergluteal cleft. No pressure injuries noted per RN flow sheets     Nutrition Requirements  Weight Used: CBW: 148#/67kg -- continued from initial RD assessment (7/13)     Estimated Energy Needs    Continue [x]  Adjust []  Calories: 0565-1127 kcal/day (MSJ x 1.2-1.5 AF)--increased needs d/t unintentional wt loss       Estimated Protein Needs    Continue [x]  Adjust []  Protein:  gm/day (1.2-1.5 gm/kg CBW)--same as above        Estimated Fluid Needs       Continue [x]  Adjust []  Fluid: 1 ml/kcal     Nutrient Intake:         [] Previous Nutrition Diagnosis: Unintended Weight Loss            [] Ongoing          [] Resolved    [] No active nutrition diagnosis identified at this time     Nutrition Diagnostic #1  Problem:  Etiology:  Statement:     Nutrition Diagnostic #2  Problem:  Etiology:  Statement:     Nutrition Intervention: meals and snacks     Goal/Expected Outcome: Pt to maintain wt of 1-2# within CBW & to consume >50% of meals, snacks, & supplements within 3 days     Indicator/Monitoring: diet order, energy intake, body composition, glucose profile, nutrition focused physical findings    Recommendations:   1. Registered Dietitian Follow-Up     Patient Profile Reviewed                           Yes [x]   No []     Nutrition History Previously Obtained        Yes [x]  No []       Pertinent Subjective Information: Pt's appetite has not improved. Eating <50% of her meals. Pt has not been receiving Prosource gelatin on her meal trays. Communicated with CA to make sure pt receives oral nutrition supplement.      Pertinent Medical Interventions: Per palliative c/s, pt in agreement to proceed with surgery. Colorectal surgery team informed, and are trying to schedule procedure tomorrow or Monday (ostomy, colonoscopy).      Diet order: Consistent carbohydrate (no snacks), DASH/TLC + Prosource gelatin sugar-free x2/day      Anthropometrics:  - Ht. 67"  - Wt. 147.9lbs; standing (7/12) - no new wts in EMR  - %wt change  - BMI 23.2 -- based on 147.9lbs  - IBW 135lbs     Pertinent Lab Data: (7/15) H/H 7.8/24.8, , eGFR 50, A1c 5.4 (7/12)      Pertinent Meds: eliquis, abx, lctated ringers @ 100ml/hr, mag-ox, simvastatin      Physical Findings:  - Appearance: No edema noted per RN flow sheets  - GI function: No c/o N+V, constipation or diarrhea. Last BM (7/16)- watery   - Tubes: N/A  - Oral/Mouth cavity: No chewing or swallowing difficulties   - Skin: Per WOCN, IAD b/l buttock-intergluteal cleft. No pressure injuries noted per RN flow sheets     Nutrition Requirements  Weight Used: CBW: 148#/67kg -- continued from initial RD assessment (7/13)     Estimated Energy Needs    Continue [x]  Adjust []  Calories: 1648-5000 kcal/day (MSJ x 1.2-1.5 AF)--increased needs d/t unintentional wt loss       Estimated Protein Needs    Continue [x]  Adjust []  Protein:  gm/day (1.2-1.5 gm/kg CBW)--same as above        Estimated Fluid Needs       Continue [x]  Adjust []  Fluid: 1 ml/kcal     Nutrient Intake: poor <50%         [x] Previous Nutrition Diagnosis: Unintended Weight Loss            [x] Ongoing          [] Resolved    [] No active nutrition diagnosis identified at this time     Nutrition Diagnostic #1  Problem: Inadequate protein energy intake  Etiology: decreased appetite in setting of malignant cancer  Symptoms: poor po (<50%)         Nutrition Intervention: meals and snacks     Goal/Expected Outcome: Pt to maintain wt of 1-2# within CBW & to consume >50% of meals, snacks, & supplements within 4 days     Indicator/Monitoring: diet order, energy intake, body composition, glucose profile, nutrition focused physical findings    Recommendations:   1. D/C carbohydrate consistent diet modifier as pt with poor po intake (blood glucose WDL)   2. Continue to provide prosource sugar-free gelatin twice daily   3. Obtain most recent wt   4. Encourage po intake at meal times

## 2020-07-16 NOTE — PROGRESS NOTE ADULT - SUBJECTIVE AND OBJECTIVE BOX
MARIERILEY DE LA CRUZ  74y, Female    All available historical data reviewed    OVERNIGHT EVENTS:  no fevers  feels well and has no complaints     ROS:  General: Denies rigors, nightsweats  HEENT: Denies headache, rhinorrhea, sore throat, eye pain  CV: Denies CP, palpitations  PULM: Denies wheezing, hemoptysis  GI: Denies hematemesis, hematochezia, melena  : Denies discharge, hematuria  MSK: Denies arthralgias, myalgias  SKIN: Denies rash, lesions  NEURO: Denies paresthesias, weakness  PSYCH: Denies depression, anxiety    VITALS:  T(F): 98.6, Max: 98.6 (07-16-20 @ 07:35)  HR: 113  BP: 120/53  RR: 19Vital Signs Last 24 Hrs  T(C): 37 (16 Jul 2020 07:35), Max: 37 (16 Jul 2020 07:35)  T(F): 98.6 (16 Jul 2020 07:35), Max: 98.6 (16 Jul 2020 07:35)  HR: 113 (16 Jul 2020 10:20) (107 - 119)  BP: 120/53 (16 Jul 2020 10:20) (93/50 - 120/53)  BP(mean): --  RR: 19 (16 Jul 2020 07:35) (18 - 19)  SpO2: --    TESTS & MEASUREMENTS:                        7.8    9.45  )-----------( 213      ( 15 Jul 2020 07:26 )             24.8     07-15    136  |  104  |  13  ----------------------------<  104<H>  4.2   |  18  |  1.1    Ca    7.4<L>      15 Jul 2020 07:26  Mg     2.1     07-15    TPro  5.7<L>  /  Alb  2.2<L>  /  TBili  0.4  /  DBili  x   /  AST  13  /  ALT  9   /  AlkPhos  122<H>  07-14    LIVER FUNCTIONS - ( 14 Jul 2020 11:31 )  Alb: 2.2 g/dL / Pro: 5.7 g/dL / ALK PHOS: 122 U/L / ALT: 9 U/L / AST: 13 U/L / GGT: x             Culture - Blood (collected 07-13-20 @ 15:44)  Source: .Blood Blood  Preliminary Report (07-14-20 @ 23:44):    No growth to date.    Culture - Stool (collected 07-12-20 @ 19:25)  Source: .Stool Feces  Final Report (07-16-20 @ 10:35):    No enteric pathogens isolated.    (Stool culture examined for Salmonella,    Shigella, Campylobacter, Aeromonas, Plesiomonas,    Vibrio, E.coli O157 and Yersinia)    Culture - Urine (collected 07-11-20 @ 21:20)  Source: .Urine Clean Catch (Midstream)  Final Report (07-13-20 @ 09:28):    >=3 organisms. Probable collection contamination.            RADIOLOGY & ADDITIONAL TESTS:  Personal review of radiological diagnostics performed  Echo and EKG results noted when applicable.     MEDICATIONS:  ALBUTerol    90 MICROgram(s) HFA Inhaler 2 Puff(s) Inhalation every 6 hours PRN  apixaban 5 milliGRAM(s) Oral every 12 hours  cefepime   IVPB      cefepime   IVPB 2000 milliGRAM(s) IV Intermittent every 12 hours  chlorhexidine 4% Liquid 1 Application(s) Topical <User Schedule>  diphenhydrAMINE 25 milliGRAM(s) Oral every 4 hours PRN  lactated ringers. 1000 milliLiter(s) IV Continuous <Continuous>  latanoprost 0.005% Ophthalmic Solution 1 Drop(s) Both EYES at bedtime  magnesium oxide 400 milliGRAM(s) Oral three times a day with meals  metoprolol succinate ER 25 milliGRAM(s) Oral daily  montelukast 10 milliGRAM(s) Oral daily  morphine  - Injectable 2 milliGRAM(s) IV Push every 4 hours PRN  saccharomyces boulardii 250 milliGRAM(s) Oral two times a day  simvastatin 20 milliGRAM(s) Oral at bedtime  tamoxifen 20 milliGRAM(s) Oral daily      ANTIBIOTICS:  cefepime   IVPB      cefepime   IVPB 2000 milliGRAM(s) IV Intermittent every 12 hours

## 2020-07-17 NOTE — CHART NOTE - NSCHARTNOTEFT_GEN_A_CORE
CASSIE and attending Dr. Alejandre spoke with pt.'s HCP, Temitope Green, at pt.'s request via telephone.  Palliative Care introduced and pt.'s clinical status and plan of care reviewed.  All questions answered in detail.   Will remain available to both pt. and HCP as needed.

## 2020-07-17 NOTE — PROGRESS NOTE ADULT - SUBJECTIVE AND OBJECTIVE BOX
Progress Note: General Surgery  Patient: RILEY MARIE , 74y (1945)Female   MRN: [id number]  Location: [address]  Visit: 07-11-20 Inpatient  Date: 07-17-20 @ 03:35    Procedure/Diagnosis:   Colovesicular fistula      Events/ 24h: patient has experienced tachycardia and low blood pressure, patient is treated with 25mg metoprolol, still experiencing loose stool. Pain well controlled. Patient is amenable to surgery if needed. Plan for colonoscopy on Monday 7/20      Vitals: T(F): 98.7 (07-17-20 @ 00:00), Max: 98.7 (07-17-20 @ 00:00)  HR: 109 (07-17-20 @ 00:00)  BP: 108/53 (07-17-20 @ 00:00) (95/50 - 120/53)  RR: 18 (07-17-20 @ 00:00)  SpO2: --    In:   07-15-20 @ 07:01  -  07-16-20 @ 07:00  --------------------------------------------------------  IN: 0 mL    07-16-20 @ 07:01  -  07-17-20 @ 03:35  --------------------------------------------------------  IN: 1520 mL      Out:   07-15-20 @ 07:01  -  07-16-20 @ 07:00  --------------------------------------------------------  OUT:    Voided: 50 mL  Total OUT: 50 mL      07-16-20 @ 07:01  -  07-17-20 @ 03:35  --------------------------------------------------------  OUT:    Indwelling Catheter - Urethral: 100 mL    Voided: 100 mL  Total OUT: 200 mL        Net:   07-15-20 @ 07:01  -  07-16-20 @ 07:00  --------------------------------------------------------  NET: -50 mL    07-16-20 @ 07:01  -  07-17-20 @ 03:35  --------------------------------------------------------  NET: 1320 mL        Diet: Diet, DASH/TLC:   Sodium & Cholesterol Restricted  Prosource Gelatein 20 Sugar Free     Qty per Day:  2 (07-16-20 @ 13:49)    IV Fluids: lactated ringers. 1000 milliLiter(s) (100 mL/Hr) IV Continuous <Continuous>  magnesium oxide 400 milliGRAM(s) Oral three times a day with meals      Physical Examination:  General Appearance: NAD  HEENT: EOMI, sclera non-icteric.  Heart: RRR   Lungs: CTABL.   Abdomen:  Soft, nontender, nondistended.   MSK/Extremities: Warm & well-perfused.   Skin: Warm, dry. No jaundice.       Medications: [Standing]  apixaban 5 milliGRAM(s) Oral two times a day  cefepime   IVPB      cefepime   IVPB 2000 milliGRAM(s) IV Intermittent every 12 hours  chlorhexidine 4% Liquid 1 Application(s) Topical <User Schedule>  lactated ringers. 1000 milliLiter(s) (100 mL/Hr) IV Continuous <Continuous>  latanoprost 0.005% Ophthalmic Solution 1 Drop(s) Both EYES at bedtime  magnesium oxide 400 milliGRAM(s) Oral three times a day with meals  metoprolol succinate ER 25 milliGRAM(s) Oral daily  montelukast 10 milliGRAM(s) Oral daily  saccharomyces boulardii 250 milliGRAM(s) Oral two times a day  simvastatin 20 milliGRAM(s) Oral at bedtime  tamoxifen 20 milliGRAM(s) Oral daily    DVT Prophylaxis:   GI Prophylaxis:   Antibiotics: cefepime   IVPB      cefepime   IVPB 2000 milliGRAM(s) IV Intermittent every 12 hours    Anticoagulation:   Medications:[PRN]  ALBUTerol    90 MICROgram(s) HFA Inhaler 2 Puff(s) Inhalation every 6 hours PRN  diphenhydrAMINE 25 milliGRAM(s) Oral every 4 hours PRN  morphine  - Injectable 2 milliGRAM(s) IV Push every 4 hours PRN      Labs:                        7.8    9.45  )-----------( 213      ( 15 Jul 2020 07:26 )             24.8     07-15    136  |  104  |  13  ----------------------------<  104<H>  4.2   |  18  |  1.1    Ca    7.4<L>      15 Jul 2020 07:26  Mg     2.1     07-15        Assessment:  74y Female patient admitted for colovesicular fistula. Patient being seen by nephrology for R ureter obstruction, with nothing to do on their end. Patient is poor surgical candidate, but will follow for need of diverting ostomy.        Plan:  - Given the patient's cancer burden and advanced disease, she is a very poor surgical candidate.  Recommend goals of care discussion.    - If patient is amenable, we could offer a palliative diverting loop ileostomy although it would be high risk.  - IV abx  - f/u ID  - GI consult for possible colonoscopic evaluation  - GYN Onc/ Med Onc evaluation  - colorectal surgery to follow .     Date/Time: 07-17-20 @ 03:35

## 2020-07-17 NOTE — PROGRESS NOTE ADULT - SUBJECTIVE AND OBJECTIVE BOX
Brief HPI  74 year old woman with history of uterine cancer 20 years ago s/p radiation, history of breast cancer s/p lumpectomy + radiation,  COPD presented with diarrhea and abdominal pain. Hospital course complicated by evidence of hydronephresis in the setting of likely obstruction, evidence of an obstructing necrotic pelvic mass, and evidence of a colovesical fistula.  Palliative care consulted for GOC.    Interval History  -Patient seen at bedside  -Denied pain, SOB, or other symptoms  -Per patient, plan for colonoscopy on 7/20  -Called and spoke with patient's HCP, Temitope Green  -Discussed patient's overall clinical condition  -Temitope reiterated patient's desire for surgery and ongoing treatment of her cancer  -We discussed overall prognosis other options including, including hospice and comfort measures  -All questions answered    PHYSICAL EXAM    T(C): , Max: 37.1 (00:00)  T(F): 98  HR: 63 (63 - 122)  BP: 105/54 (91/54 - 149/85)  RR: 18 (18 - 18)  SpO2: --    GEN:  NAD, lying in bed  HEENT; MMM, EOMI  CV: no tachycardia  Lungs: no wheezing, no accessory muscle use  Neuro: no focal deficits          LABS:                          8.4    11.09 )-----------( 257      ( 17 Jul 2020 08:32 )             27.8                                                                                      07-17    136  |  104  |  15  ----------------------------<  76  4.0   |  19  |  1.2    Ca    7.8<L>      17 Jul 2020 08:32  Mg     1.9     07-17                                                        MEDICATIONS  (STANDING):  chlorhexidine 4% Liquid 1 Application(s) Topical <User Schedule>  enoxaparin Injectable 70 milliGRAM(s) SubCutaneous every 12 hours  lactated ringers. 1000 milliLiter(s) (100 mL/Hr) IV Continuous <Continuous>  latanoprost 0.005% Ophthalmic Solution 1 Drop(s) Both EYES at bedtime  metoprolol succinate ER 25 milliGRAM(s) Oral daily  montelukast 10 milliGRAM(s) Oral daily  saccharomyces boulardii 250 milliGRAM(s) Oral two times a day  simvastatin 20 milliGRAM(s) Oral at bedtime  tamoxifen 20 milliGRAM(s) Oral daily    MEDICATIONS  (PRN):  ALBUTerol    90 MICROgram(s) HFA Inhaler 2 Puff(s) Inhalation every 6 hours PRN Bronchospasm  diphenhydrAMINE 25 milliGRAM(s) Oral every 4 hours PRN Rash and/or Itching

## 2020-07-17 NOTE — PHYSICAL THERAPY INITIAL EVALUATION ADULT - GENERAL OBSERVATIONS, REHAB EVAL
PT IE completed. Patient encountered semifowler's in bed, in NAD, +IV, +catherine, agreeable to therapy. Patient very pleasant, able to follow commands however needs intermittent rest period in between activities.

## 2020-07-17 NOTE — PROGRESS NOTE ADULT - ASSESSMENT
73 yo F with PMH of Left Breast CA (radiation 2 years ago), Cervical CA (chemoradiation 15 years ago), COPD, DM, HTN, osteoporosis presents to ED with R sided abdominal pain and diarrhea x 3 weeks. Urology called for CT A/P showing severe right hydronephrosis and dilatation of R ureter suggesting obstruction 2* encasement of right ureter by pelvic mass (7cm).  Amin catheter with fecaluria  r/o colovesical fistula.       Plan:   Awaiting GI eval with colonoscopy on Monday  F/U Gen. surgery recommendations   Cont. Amin catheter drain to gravity  Strict I&O   Irrigate Amin catheter    will f/u

## 2020-07-17 NOTE — PHYSICAL THERAPY INITIAL EVALUATION ADULT - PERTINENT HX OF CURRENT PROBLEM, REHAB EVAL
75 yo F with PMH of cervical cancer s/p radiation therapy (20 years ago), breast cancer (ER+ / CO+) s/p lumpectomy + radiation thereby (6 years ago), uterine cancer  s/p chemotherapy ( 3 years ago) as per patient, metastasis to bones,  COPD not on home oxygen, DM, HTN, osteoporosis, DVT on eliquis, Anemia of chronic disease, CKD 3 presents to ED with R sided abdominal pain and diarrhea with gradual onset, episodic, 7/10 RLQ abdominal pain radiating to groin.

## 2020-07-17 NOTE — PROGRESS NOTE ADULT - SUBJECTIVE AND OBJECTIVE BOX
Hospital Day:  6d    Patient is a 74y old  Female who presents with a chief complaint of 6d    Subjective:  pt seen and examined at bedside. She does not report any pain, still having Loose bowel movements. Pt is incontinent.   Catherine still in place, draining feculent fluid.   No fever, no chills.  Past Medical Hx:   Osteoporosis  Obesity  COPD (chronic obstructive pulmonary disease)  Asthma  Diabetes  Hypertension  Breast cancer  Cervical cancer    Past Sx:  History of appendectomy  History of lumpectomy of left breast    Allergies:  iodinated radiocontrast agents (Other)  penicillin (Unknown)    Current Meds:   Standng Meds:  cefepime   IVPB      cefepime   IVPB 2000 milliGRAM(s) IV Intermittent every 12 hours  chlorhexidine 4% Liquid 1 Application(s) Topical <User Schedule>  enoxaparin Injectable 70 milliGRAM(s) SubCutaneous every 12 hours  lactated ringers. 1000 milliLiter(s) (100 mL/Hr) IV Continuous <Continuous>  latanoprost 0.005% Ophthalmic Solution 1 Drop(s) Both EYES at bedtime  magnesium oxide 400 milliGRAM(s) Oral three times a day with meals  metoprolol succinate ER 25 milliGRAM(s) Oral daily  montelukast 10 milliGRAM(s) Oral daily  saccharomyces boulardii 250 milliGRAM(s) Oral two times a day  simvastatin 20 milliGRAM(s) Oral at bedtime  tamoxifen 20 milliGRAM(s) Oral daily    PRN Meds:  ALBUTerol    90 MICROgram(s) HFA Inhaler 2 Puff(s) Inhalation every 6 hours PRN Bronchospasm  diphenhydrAMINE 25 milliGRAM(s) Oral every 4 hours PRN Rash and/or Itching  morphine  - Injectable 2 milliGRAM(s) IV Push every 4 hours PRN Severe Pain (7 - 10)    HOME MEDICATIONS:  Advair Diskus 100 mcg-50 mcg inhalation powder: 1 puff(s) inhaled 2 times a day  albuterol 90 mcg/inh inhalation aerosol: 2 puff(s) inhaled 4 times a day, As Needed  alendronate weekly: 70 milligram(s) orally once a week (wednesday)  aspirin 81 mg oral tablet: 1 tab(s) orally once a day  Eliquis 5 mg oral tablet: 1 tab(s) orally 2 times a day  enalapril 10 mg oral tablet: 1 tab(s) orally once a day  latanoprost 0.005% ophthalmic solution: 1 drop(s) to each affected eye once a day (in the evening)  megestrol 20 mg oral tablet: 1 tab(s) orally 4 times a day  metoprolol succinate 25 mg oral tablet, extended release: 1 tab(s) orally once a day  montelukast 10 mg oral tablet: 1 tab(s) orally once a day  simvastatin 20 mg oral tablet: 1 tab(s) orally once a day (at bedtime)  tamoxifen 20 mg oral tablet: 1 tab(s) orally once a day      Vital Signs:   T(F): 98 (07-17-20 @ 08:00), Max: 98.7 (07-17-20 @ 00:00)  HR: 122 (07-17-20 @ 10:15) (93 - 122)  BP: 149/85 (07-17-20 @ 10:15) (91/54 - 149/85)  RR: 18 (07-17-20 @ 08:00) (18 - 18)  SpO2: --      07-16-20 @ 07:01  -  07-17-20 @ 07:00  --------------------------------------------------------  IN: 1970 mL / OUT: 200 mL / NET: 1770 mL        Physical Exam:   GENERAL: NAD  HEENT: NCAT  CHEST/LUNG: CTAB  HEART: Regular rate and rhythm; s1 s2 appreciated, No murmurs, rubs, or gallops  ABDOMEN: Soft, Nontender, Nondistended; Bowel sounds present  EXTREMITIES: No LE edema b/l  SKIN: no rashes, no new lesions  NERVOUS SYSTEM:  Alert & Oriented X3  LINES/CATHETERS:        Labs:                         8.4    11.09 )-----------( 257      ( 17 Jul 2020 08:32 )             27.8     Neutophil% 89.5, Lymphocyte% 5.5, Monocyte% 3.0, Bands% 0.5 07-17-20 @ 08:32    17 Jul 2020 08:32    136    |  104    |  15     ----------------------------<  76     4.0     |  19     |  1.2      Ca    7.8        17 Jul 2020 08:32  Mg     1.9       17 Jul 2020 08:32    Culture - Blood (collected 07-13-20 @ 15:44)  Source: .Blood Blood  Preliminary Report (07-14-20 @ 23:44):    No growth to date.    Radiology:       Assessment and Plan:   4 yo F with PMH of cervical cancer s/p radiation therapy (20 years ago), breast cancer (ER+ / ID+) s/p lumpectomy + radiation thereby (6 years ago), uterine cancer  s/p chemotherapy ( 3 years ago) as per patient, metastasis to bones,  COPD not on home oxygen, DM, HTN, osteoporosis, DVT on eliquis, Anemia of chronic disease, CKD 4 presents to ED with R sided abdominal pain and diarrhea foudn to have increase in her pelvic mass, along with Hydronephrosis, colovesical fistula.   #RLQ/suprapubic pain 2/2 obstructive pelvic mass    #Right sided hydronephrosis 2/2 pelvic mass with necrosis   #Colovesical fistula   -necrotic pelvic mass seen on imaging from [facility] with possible evidence of fistulization to the cecum (records in chart)Culture   -catherine in place draining what appears to be fecal matter   -uro and gen surg eval noted , possible diversion Ostomy  -seen by IR no perc nephrostomy recommended   -cont pain control  - PRN nausea medicine.   -f/u bcx and urine culture : Results:   >=3 organisms. Probable collection contamination. (07.11.20 @ 21:20)  F/U repeat Urine culture.   - C. diff not detected  - maintain catherine at this time  - pt cannot have contrast studies given iodine allergy she states when she had contrast in the past she needed to be resuscitated given her adverse rxn   - F/U palliative consult to further speak with pt given her overall prognosis. Pt is still reluctant on making a decision of possible ostomy, colonoscopy  Patient is undecided on whether she wants to pursue surgery. She does not want hospice even     though she understands her disease is advanced and terminal. She wants to remain full code. Palliative consult pending. Will attempt to set up family meeting.   - Colonoscopy on Monday, clear liquids on Sunday, GoLytely at 4pm sunday, dulcolax 20 mg once on Sunday.   - Pt shifted to Lovenox, to receive last dose on Sunday 6pm.  - F/U gyn oncology.  - F/U surgery.  - F/U KUB  -PT/OT eval    #leukocytosis, possible pyelonephritis  possibly 2/2 necrotic pelvic mass vs pyelo  On  cefepime 2g q12h and repeat urine culture   - F/U speciation form lab.   monitor fever curve  daily CBC    #chronic anemia likely 2/2 iron deficiency vs. hematuria  cont iron supplementation  trend cbc transfuse if hgb <7  maintain active type and screen   monitor for overt bleeding  -Eliquis was held on admission for hematuria, but resumed with stable hb.   Stopped today and trasnitioned to Lovenox , last dose on Sunday for colonoscopy on Monday. Reinstate Eliquis after colonoscopy depending on surgery and results of colonoscopy.    #cervical/uterine/breast cancer history  cont tamoxifen, megestrol  -Gyn onc evaluation    #hx of right LE DVT held Eliquis on admission at this time given hematuria and drop in hgb   - resumed Eliquis 5 BID but stopped today, shifted to Lovenox, pending colonoscopy on Monday.   - F/U cbc    FULL code poor overall prognosis   Activity As tolerated  Diet: regular  COde: Full  Dispo: Acute for now    Consultants on board :  F/U Colorectal surgery, GI, palliative, oncologist ( private : Jefferson Fan), Gyn onc

## 2020-07-17 NOTE — OCCUPATIONAL THERAPY INITIAL EVALUATION ADULT - GENERAL OBSERVATIONS, REHAB EVAL
Pt received supine in bed. + IV lock, + catherine. Tx: [phone] Pt chart thoroughly reviewed prior to OT evaluation

## 2020-07-17 NOTE — PHYSICAL THERAPY INITIAL EVALUATION ADULT - IMPAIRMENTS FOUND, PT EVAL
poor safety awareness/ergonomics and body mechanics/aerobic capacity/endurance/gait, locomotion, and balance

## 2020-07-17 NOTE — PROGRESS NOTE ADULT - SUBJECTIVE AND OBJECTIVE BOX
Subjective:   Pt. seen and examined at bedside in NAD. Afebrile. Denies fever, chills, N/V.       ROS:    [ x ] A 10 Point Review of Systems was negative except where noted  [    ] Due to altered mental status/intubation, subjective information was not able to be obtained from the patient. History was obtained to the extent possible from review of the chart and collateral sources of information.     ALBUTerol    90 MICROgram(s) HFA Inhaler 2 Puff(s) Inhalation every 6 hours PRN  chlorhexidine 4% Liquid 1 Application(s) Topical <User Schedule>  diphenhydrAMINE 25 milliGRAM(s) Oral every 4 hours PRN  enoxaparin Injectable 70 milliGRAM(s) SubCutaneous every 12 hours  lactated ringers. 1000 milliLiter(s) IV Continuous <Continuous>  latanoprost 0.005% Ophthalmic Solution 1 Drop(s) Both EYES at bedtime  metoprolol succinate ER 25 milliGRAM(s) Oral daily  montelukast 10 milliGRAM(s) Oral daily  saccharomyces boulardii 250 milliGRAM(s) Oral two times a day  simvastatin 20 milliGRAM(s) Oral at bedtime  tamoxifen 20 milliGRAM(s) Oral daily        Vital Signs Last 24 Hrs  T(C): 36.7 (17 Jul 2020 08:00), Max: 37.1 (17 Jul 2020 00:00)  T(F): 98 (17 Jul 2020 08:00), Max: 98.7 (17 Jul 2020 00:00)  HR: 122 (17 Jul 2020 10:15) (93 - 122)  BP: 149/85 (17 Jul 2020 10:15) (91/54 - 149/85)  RR: 18 (17 Jul 2020 08:00) (18 - 18)         PE  Constitutional:  awake and alert in NAD, age appropriate development   HEENT: EOMI  Neck: no pain  Back: No CVA tenderness  Respiratory: No accessory respiratory muscle use  Abd: Soft, NT/ND  no suprapubic ttp.  :  + Amin catheter in place drains yellow urine with occasional brown sediment and mucus appearing tissues   Extremities: no edema  Neurological: A/O x 3  Psychiatric: Normal mood, normal affect  Skin: No rashes        I&O's Detail    16 Jul 2020 07:01  -  17 Jul 2020 07:00  --------------------------------------------------------  IN:    IV PiggyBack: 50 mL    lactated ringers.: 1600 mL    Oral Fluid: 320 mL  Total IN: 1970 mL    OUT:    Indwelling Catheter - Urethral: 100 mL    Voided: 100 mL  Total OUT: 200 mL    Total NET: 1770 mL      17 Jul 2020 07:01  -  17 Jul 2020 14:27  --------------------------------------------------------  IN:    lactated ringers.: 700 mL  Total IN: 700 mL    OUT:  Total OUT: 0 mL    Total NET: 700 mL          LABS:                        8.4    11.09 )-----------( 257      ( 17 Jul 2020 08:32 )             27.8     07-17    136  |  104  |  15  ----------------------------<  76  4.0   |  19  |  1.2    Ca    7.8<L>      17 Jul 2020 08:32  Mg     1.9     07-17     Urinalysis (07.11.20 @ 21:20)    Glucose Qualitative, Urine: Negative    Blood, Urine: Large    pH Urine: 6.0    Color: Yellow    Urine Appearance: Turbid    Bilirubin: Negative    Ketone - Urine: Negative    Specific Gravity: 1.020    Protein, Urine: 30 mg/dL    Urobilinogen: <2 mg/dL    Nitrite: Negative    Leukocyte Esterase Concentration: Large      Culture - Urine (collected 15 Jul 2020 12:20)  Source: .Urine Catheterized  Final Report (17 Jul 2020 13:36):    >100,000 CFU/ml Gram Negative Rods    Identification and susceptibility to follow.    Culture - Blood (07.13.20 @ 15:44)    Specimen Source: .Blood Blood    Culture Results:   No growth to date.    RADIOLOGY & ADDITIONAL STUDIES:     < from: US Abdomen Limited (07.12.20 @ 11:40) >    EXAM:  US ABDOMEN LIMITED            PROCEDURE DATE:  07/12/2020            INTERPRETATION:  CLINICAL HISTORY: History of uterine cancer. Hepatic lesion seen on CT from 7/11/2020..    COMPARISON: Abdominal ultrasound dated 1/23/2018 and correlationmade to abdominal CT dated 7/11/2020..    PROCEDURE: Ultrasound of the right upper quadrant was performed.    FINDINGS:    LIVER:  Normal in contour. Increased liver parenchymal echogenicity. Liver measures 14.9 cm in length.  1.2 x 1.9 cm hypoechoiclesion in the left lobe of the liver.    GALLBLADDER/BILIARY TREE:  Gallbladder sludge, no calculi.Gallbladder wall thickness measures 3 mm, which is on the upper limit of normal. No wall thickening or pericholecystic fluid. Negative sonographic Keita's sign. No intrahepatic biliary ductal dilatation. The common bile duct measures 4 mm, which is normal.    PANCREAS:  obscured by overlying bowel gas.    KIDNEY:  Right kidney measures 9.7 cm in length. Moderate hydronephrosis. There is a 3.9 cm calyceal diverticulum. No evidence of renal calculi.    AORTA/IVC:  Visualized proximal portions unremarkable.    ASCITES:  Mild to moderate perihepatic ascites.    IMPRESSION:    1.  1.2 x 1.9 cm hypoechoic lesion in the left lobe of the liver. This issuspicious for a metastatic focus. Recommend MRI of the abdomen with IV contrast for further characterization.    2.  Moderate hydronephrosis of the right kidney, which has increased in size when compared to previous ultrasound dated 1/23/2018.    3. Right renal calyceal diverticulum, stable.    4.  Mild to moderate perihepatic ascites.          ISABELLA WILSON M.D., RESIDENT RADIOLOGIST  This document has been electronically signed.  RAYNA HIDALGO M.D., ATTENDING RADIOLOGIST  This document has been electronically signed. Jul 13 2020  8:45AM        < end of copied text >

## 2020-07-17 NOTE — OCCUPATIONAL THERAPY INITIAL EVALUATION ADULT - PERTINENT HX OF CURRENT PROBLEM, REHAB EVAL
Presented with abdominal pain radiating to groin and diarrhea. Extensive h/o malignancy (cervical, breast, uterine with metastasis to bone. + tachycardia

## 2020-07-17 NOTE — PROGRESS NOTE ADULT - ASSESSMENT
Consult Summary  74 year old woman with history of uterine cancer 20 years ago s/p radiation, history of breast cancer s/p lumpectomy + radiation,  COPD presented with diarrhea and abdominal pain. Hospital course complicated by evidence of hydronephresis in the setting of likely obstruction, evidence of an obstructing necrotic pelvic mass, and evidence of a colovesical fistula.  Palliative care consulted for GOC.    Patient seen at bedside. Denied pain, SOB, or other symptoms. Per patient, plan for colonoscopy on 7/20. Called and spoke with patient's HCP, Temitope Green. Discussed patient's overall clinical condition. Temitope reiterated patient's desire for surgery and ongoing treatment of her cancer. We discussed overall poor prognosis in the setting of her high risk surgery and touched on options should her clinical condition worsen, including hospice and comfort measures. All questions answered.    Morphine Equivalent Daily Dose (MEDD):  NA    Recommendations:  - Full code, ongoing medical management per primary team  - Colonoscopy planned for 7/20; Surgical intervention to be scheduled by surgical team   - HCP filled out with patient naming her niece, Temitope Green, her HCP; patient has no children  - Palliative care will continue to follow and be available for GOC discussions as appropriate.    Please Call [phone] PRN

## 2020-07-18 NOTE — CONSULT NOTE ADULT - SUBJECTIVE AND OBJECTIVE BOX
HPI:  74 y.o female patient with PMH of cervical cancer s/p radiation therapy (20 years ago), breast cancer (ER+ / ID+) s/p lumpectomy + radiation thereby (6 years ago), uterine cancer  s/p chemotherapy ( 3 years ago) as per patient, metastasis to bones,  COPD not on home oxygen, DM, HTN, osteoporosis, DVT on eliquis, anemia of chronic disease, CKD 3 presents to ED with R sided abdominal pain and diarrhea.   Pt reports gradual onset, episodic, 7/10 RLQ abdominal pain radiating to groin for past 2 days, with no aggravating or relieving factors. Pt reports 100lb weight loss in past 1 year due to malignant cancer.   Pt also reports persistent non bloody, non mucoid, watery diarrhea for past 3 weeks, 4-5 episodes per day, not associated with food intake, and no recent travel or sick contact history. Pt has not passed any stool today.   Denies any fever, cough, chest pain, SOB, n/v, recent travel or sick contacts.     A CT abdomen/pelvis revealed severe right hydronephrosis suggesting obstruction due to encasement of the right ureter secondary to a pelvis mass measuring 7 x 3.2 x 6 cm containing air-fluid levels and multiple small bowel loops containing air-fluid levels w/o evidence of obstruction; a questionable colovesical fistula was also present which was not apparent on prior CT abdomen/pelvis in 2018. Urology had been consulted to possibly do a PCN to relieve pressure secondary to the ureteral obstruction but recommended no intervention; the patient was started on IV antibiotics and had a catherine catheter inserted, revealing feculent output.    Surgery determined the patient to be a poor surgical candidate but suggested a diverting ileostomy as a palliative treatment;    Cardiology:  Patient doesn't have any history of cardiac disease. She denies active or previous chest pain. She states that she has chronic shortness of breath on exertion from her COPD but no LE edema and is able to walk a decent distance before she stops, orthopnea or PND. No syncope or palpitations.         PAST MEDICAL & SURGICAL HISTORY  Osteoporosis  Obesity: bmi 41  COPD (chronic obstructive pulmonary disease)  Asthma: last attack 1/2018- hospitalized&amp; steroids  Diabetes  Hypertension  Breast cancer: left, radiation ~2 yrs ago  Cervical cancer: radiation; chemo ~15 yrs ago  History of appendectomy  History of lumpectomy of left breast      FAMILY HISTORY:  FAMILY HISTORY:  Family history of breast cancer (Mother, Sibling): mother; sister      SOCIAL HISTORY:  []smoker  []Alcohol  []Drug    ALLERGIES:  iodinated radiocontrast agents (Other)  penicillin (Unknown)      MEDICATIONS:  MEDICATIONS  (STANDING):  cefepime   IVPB 2000 milliGRAM(s) IV Intermittent every 12 hours  cefepime   IVPB      chlorhexidine 4% Liquid 1 Application(s) Topical <User Schedule>  enoxaparin Injectable 70 milliGRAM(s) SubCutaneous every 12 hours  lactated ringers. 1000 milliLiter(s) (100 mL/Hr) IV Continuous <Continuous>  latanoprost 0.005% Ophthalmic Solution 1 Drop(s) Both EYES at bedtime  montelukast 10 milliGRAM(s) Oral daily  saccharomyces boulardii 250 milliGRAM(s) Oral two times a day  simvastatin 20 milliGRAM(s) Oral at bedtime  tamoxifen 20 milliGRAM(s) Oral daily    MEDICATIONS  (PRN):  ALBUTerol    90 MICROgram(s) HFA Inhaler 2 Puff(s) Inhalation every 6 hours PRN Bronchospasm  diphenhydrAMINE 25 milliGRAM(s) Oral every 4 hours PRN Rash and/or Itching      HOME MEDICATIONS:  Home Medications:  Advair Diskus 100 mcg-50 mcg inhalation powder: 1 puff(s) inhaled 2 times a day (12 Jul 2020 01:43)  albuterol 90 mcg/inh inhalation aerosol: 2 puff(s) inhaled 4 times a day, As Needed (12 Jul 2020 01:43)  alendronate weekly: 70 milligram(s) orally once a week (wednesday) (12 Jul 2020 01:43)  aspirin 81 mg oral tablet: 1 tab(s) orally once a day (12 Jul 2020 01:43)  Eliquis 5 mg oral tablet: 1 tab(s) orally 2 times a day (12 Jul 2020 01:43)  enalapril 10 mg oral tablet: 1 tab(s) orally once a day (12 Jul 2020 01:43)  latanoprost 0.005% ophthalmic solution: 1 drop(s) to each affected eye once a day (in the evening) (12 Jul 2020 01:43)  megestrol 20 mg oral tablet: 1 tab(s) orally 4 times a day (12 Jul 2020 01:43)  metoprolol succinate 25 mg oral tablet, extended release: 1 tab(s) orally once a day (12 Jul 2020 01:43)  montelukast 10 mg oral tablet: 1 tab(s) orally once a day (12 Jul 2020 01:43)  simvastatin 20 mg oral tablet: 1 tab(s) orally once a day (at bedtime) (12 Jul 2020 01:43)  tamoxifen 20 mg oral tablet: 1 tab(s) orally once a day (12 Jul 2020 01:43)      VITALS:   T(F): 97.5 (07-18 @ 15:00), Max: 98.7 (07-17 @ 00:00)  HR: 104 (07-18 @ 15:00) (63 - 122)  BP: 99/56 (07-18 @ 15:00) (82/52 - 149/85)  BP(mean): --  RR: 19 (07-18 @ 15:00) (18 - 19)  SpO2: --    I&O's Summary    17 Jul 2020 07:01  -  18 Jul 2020 07:00  --------------------------------------------------------  IN: 2400 mL / OUT: 0 mL / NET: 2400 mL    18 Jul 2020 07:01  -  18 Jul 2020 23:16  --------------------------------------------------------  IN: 840 mL / OUT: 25 mL / NET: 815 mL        REVIEW OF SYSTEMS:  CONSTITUTIONAL: No weakness, fevers or chills  EYES: No visual changes  ENT: No vertigo or throat pain   NECK: No pain or stiffness  RESPIRATORY: No cough, wheezing, hemoptysis; Shortness of breath attributed to COPD  CARDIOVASCULAR: No chest pain or palpitations  GASTROINTESTINAL: Abdominal or epigastric pain. Diarrhea  GENITOURINARY: No dysuria, frequency or hematuria  NEUROLOGICAL: No numbness or weakness  SKIN: No itching, no rashes  MSK: No pain    PHYSICAL EXAM:  NEURO: patient is awake , alert and oriented  GEN: Not in acute distress  NECK: no thyroid enlargement, no JVD  LUNGS: Clear to auscultation bilaterally   CARDIOVASCULAR: S1/S2 present, tachycardic  ABD: Soft, non-tender, non-distended  EXT: No ROSEANN  SKIN: Intact    LABS:                        8.4    11.09 )-----------( 257      ( 17 Jul 2020 08:32 )             27.8     07-17    136  |  104  |  15  ----------------------------<  76  4.0   |  19  |  1.2    Ca    7.8<L>      17 Jul 2020 08:32  Mg     1.9     07-17      Troponin trend:            RADIOLOGY:  -CXR:  -TTE:  -CCTA:  -STRESS TEST:  -CATHETERIZATION:    ECG: NSR with sinus tachycardia at 118bpm with PACs    TELEMETRY EVENTS: HPI:  74 y.o female patient with PMH of cervical cancer s/p radiation therapy (20 years ago), breast cancer (ER+ / MO+) s/p lumpectomy + radiation thereby (6 years ago), uterine cancer  s/p chemotherapy ( 3 years ago) as per patient, metastasis to bones,  COPD not on home oxygen, DM, HTN, osteoporosis, DVT on eliquis, anemia of chronic disease, CKD 3 presents to ED with R sided abdominal pain and diarrhea.   Pt reports gradual onset, episodic, 7/10 RLQ abdominal pain radiating to groin for past 2 days, with no aggravating or relieving factors. Pt reports 100lb weight loss in past 1 year due to malignant cancer.   Pt also reports persistent non bloody, non mucoid, watery diarrhea for past 3 weeks, 4-5 episodes per day, not associated with food intake, and no recent travel or sick contact history. Pt has not passed any stool today.   Denies any fever, cough, chest pain, SOB, n/v, recent travel or sick contacts.     A CT abdomen/pelvis revealed severe right hydronephrosis suggesting obstruction due to encasement of the right ureter secondary to a pelvis mass measuring 7 x 3.2 x 6 cm containing air-fluid levels and multiple small bowel loops containing air-fluid levels w/o evidence of obstruction; a questionable colovesical fistula was also present which was not apparent on prior CT abdomen/pelvis in 2018. Urology had been consulted to possibly do a PCN to relieve pressure secondary to the ureteral obstruction but recommended no intervention; the patient was started on IV antibiotics and had a catherine catheter inserted, revealing feculent output.    Surgery determined the patient to be a poor surgical candidate but suggested a diverting ileostomy as a palliative treatment;    Cardiology:  Patient doesn't have any history of cardiac disease. She denies active or previous chest pain. She states that she has chronic shortness of breath on exertion from her COPD but no LE edema and is able to walk a decent distance before she stops, orthopnea or PND. No syncope or palpitations.         PAST MEDICAL & SURGICAL HISTORY  Osteoporosis  Obesity: bmi 41  COPD (chronic obstructive pulmonary disease)  Asthma: last attack 1/2018- hospitalized&amp; steroids  Diabetes  Hypertension  Breast cancer: left, radiation ~2 yrs ago  Cervical cancer: radiation; chemo ~15 yrs ago  History of appendectomy  History of lumpectomy of left breast      FAMILY HISTORY:  Family history of breast cancer (Mother, Sibling): mother; sister      SOCIAL HISTORY:  []smoker  []Alcohol  []Drug    ALLERGIES:  iodinated radiocontrast agents (Other)  penicillin (Unknown)      MEDICATIONS:  MEDICATIONS  (STANDING):  cefepime   IVPB 2000 milliGRAM(s) IV Intermittent every 12 hours  cefepime   IVPB      chlorhexidine 4% Liquid 1 Application(s) Topical <User Schedule>  enoxaparin Injectable 70 milliGRAM(s) SubCutaneous every 12 hours  lactated ringers. 1000 milliLiter(s) (100 mL/Hr) IV Continuous <Continuous>  latanoprost 0.005% Ophthalmic Solution 1 Drop(s) Both EYES at bedtime  montelukast 10 milliGRAM(s) Oral daily  saccharomyces boulardii 250 milliGRAM(s) Oral two times a day  simvastatin 20 milliGRAM(s) Oral at bedtime  tamoxifen 20 milliGRAM(s) Oral daily    MEDICATIONS  (PRN):  ALBUTerol    90 MICROgram(s) HFA Inhaler 2 Puff(s) Inhalation every 6 hours PRN Bronchospasm  diphenhydrAMINE 25 milliGRAM(s) Oral every 4 hours PRN Rash and/or Itching      HOME MEDICATIONS:  Home Medications:  Advair Diskus 100 mcg-50 mcg inhalation powder: 1 puff(s) inhaled 2 times a day (12 Jul 2020 01:43)  albuterol 90 mcg/inh inhalation aerosol: 2 puff(s) inhaled 4 times a day, As Needed (12 Jul 2020 01:43)  alendronate weekly: 70 milligram(s) orally once a week (wednesday) (12 Jul 2020 01:43)  aspirin 81 mg oral tablet: 1 tab(s) orally once a day (12 Jul 2020 01:43)  Eliquis 5 mg oral tablet: 1 tab(s) orally 2 times a day (12 Jul 2020 01:43)  enalapril 10 mg oral tablet: 1 tab(s) orally once a day (12 Jul 2020 01:43)  latanoprost 0.005% ophthalmic solution: 1 drop(s) to each affected eye once a day (in the evening) (12 Jul 2020 01:43)  megestrol 20 mg oral tablet: 1 tab(s) orally 4 times a day (12 Jul 2020 01:43)  metoprolol succinate 25 mg oral tablet, extended release: 1 tab(s) orally once a day (12 Jul 2020 01:43)  montelukast 10 mg oral tablet: 1 tab(s) orally once a day (12 Jul 2020 01:43)  simvastatin 20 mg oral tablet: 1 tab(s) orally once a day (at bedtime) (12 Jul 2020 01:43)  tamoxifen 20 mg oral tablet: 1 tab(s) orally once a day (12 Jul 2020 01:43)      VITALS:   T(F): 97.5 (07-18 @ 15:00), Max: 98.7 (07-17 @ 00:00)  HR: 104 (07-18 @ 15:00) (63 - 122)  BP: 99/56 (07-18 @ 15:00) (82/52 - 149/85)  BP(mean): --  RR: 19 (07-18 @ 15:00) (18 - 19)  SpO2: --    I&O's Summary    17 Jul 2020 07:01  -  18 Jul 2020 07:00  --------------------------------------------------------  IN: 2400 mL / OUT: 0 mL / NET: 2400 mL    18 Jul 2020 07:01  -  18 Jul 2020 23:16  --------------------------------------------------------  IN: 840 mL / OUT: 25 mL / NET: 815 mL        REVIEW OF SYSTEMS:  CONSTITUTIONAL: No weakness, fevers or chills  EYES: No visual changes  ENT: No vertigo or throat pain   NECK: No pain or stiffness  RESPIRATORY: No cough, wheezing, hemoptysis; Shortness of breath attributed to COPD  CARDIOVASCULAR: No chest pain or palpitations  GASTROINTESTINAL: Abdominal or epigastric pain. Diarrhea  GENITOURINARY: No dysuria, frequency or hematuria  NEUROLOGICAL: No numbness or weakness  SKIN: No itching, no rashes  MSK: No pain    PHYSICAL EXAM:  NEURO: patient is awake , alert and oriented  GEN: Not in acute distress  NECK: no thyroid enlargement, no JVD  LUNGS: Clear to auscultation bilaterally   CARDIOVASCULAR: S1/S2 present, tachycardic  ABD: Soft, non-tender, non-distended  EXT: No ROSEANN  SKIN: Intact      LABS:                        8.4    11.09 )-----------( 257      ( 17 Jul 2020 08:32 )             27.8     07-17    136  |  104  |  15  ----------------------------<  76  4.0   |  19  |  1.2    Ca    7.8<L>      17 Jul 2020 08:32  Mg     1.9     07-17          ECG: NSR with sinus tachycardia at 118bpm with PACs

## 2020-07-18 NOTE — CONSULT NOTE ADULT - ATTENDING COMMENTS
73 y/o with history of cervical cancer and breast cancer, will pelvic mass, gyn consulted for evaluation. Patient declining pelvic exam at this time. Will repeat in AM and offer EMB. GYN will follow.

## 2020-07-18 NOTE — PROGRESS NOTE ADULT - SUBJECTIVE AND OBJECTIVE BOX
GENERAL SURGERY PROGRESS NOTE     CYNTHIA RILEY  74y  Female  Hospital day :7d    OVERNIGHT EVENTS: no acute events overnight     T(F): 98 (07-18-20 @ 00:00), Max: 98 (07-17-20 @ 08:00)  HR: 101 (07-18-20 @ 00:00) (63 - 122)  BP: 91/51 (07-18-20 @ 00:00) (91/51 - 149/85)  RR: 18 (07-18-20 @ 00:00) (18 - 18)    DIET/FLUIDS: lactated ringers. 1000 milliLiter(s) IV Continuous <Continuous>    URINE:   07-16-20 @ 07:01  -  07-17-20 @ 07:00  --------------------------------------------------------  OUT: 100 mL     Indwelling Urethral Catheter:     Connect To:  Straight Drainage/[place]    Indication:  Urinary Retention / Obstruction (07-18-20 @ 00:53)      LABS  CAPILLARY BLOOD GLUCOSE                              8.4    11.09 )-----------( 257      ( 17 Jul 2020 08:32 )             27.8       Auto Neutrophil %: 89.5 % (07-17-20 @ 08:32)  Auto Immature Granulocyte %: 0.5 % (07-17-20 @ 08:32)    07-17    136  |  104  |  15  ----------------------------<  76  4.0   |  19  |  1.2      Calcium, Total Serum: 7.8 mg/dL (07-17-20 @ 08:32)      Culture - Urine (collected 15 Jul 2020 12:20)  Source: .Urine Catheterized  Final Report (17 Jul 2020 15:18):    >100,000 CFU/ml Proteus mirabilis    Susceptibility to follow.

## 2020-07-18 NOTE — CONSULT NOTE ADULT - ASSESSMENT
74 y.o female patient with PMH of cervical cancer s/p radiation therapy (20 years ago), breast cancer (ER+ / DE+) s/p lumpectomy + radiation thereby (6 years ago), uterine cancer  s/p chemotherapy ( 3 years ago) as per patient, metastasis to bones,  COPD not on home oxygen, DM, HTN, osteoporosis, DVT on eliquis, anemia of chronic disease, CKD 3 presents to ED with R sided abdominal pain and diarrhea. She was found to have a pelvic mass with possible colovesical fistula.    # Pre-op cardiac risk stratification and optimization  - Colonoscopy on 7/20/2020  - Possible diverting ileostomy  - No signs of active chest pain, CHF exacerbation, MI or arrhythmia; patient with no known history of cardiac disease  - 2D echo ordered: check results  - Patient would be at intermediate cardiac risk      *** Final recommendations by attending to follow *** 74 y.o female patient with PMH of cervical cancer s/p radiation therapy (20 years ago), breast cancer (ER+ / SC+) s/p lumpectomy + radiation thereby (6 years ago), uterine cancer s/p chemotherapy ( 3 years ago) as per patient, metastasis to bones,  COPD not on home oxygen, DM, HTN, osteoporosis, DVT on eliquis, anemia of chronic disease, CKD 3 presents to ED with R sided abdominal pain and diarrhea. She was found to have a pelvic mass with possible colovesical fistula.    # Pre-op cardiac risk stratification and optimization  - Colonoscopy on 7/20/2020  - Possible diverting ileostomy  - No signs of active chest pain, CHF exacerbation, MI or arrhythmia; patient with no known history of cardiac disease  - 2D echo ordered: check results  - Patient would be at intermediate cardiac risk      This consult serves only as a ventura-operative cardiac risk stratification and evaluation to predict 30-days cardiac complications risk and mortality.  The decision to proceed with the surgery/procedure is made by the performing physician and the patient.

## 2020-07-18 NOTE — PROGRESS NOTE ADULT - ASSESSMENT
Assessment:  74y Female patient admitted for colovesicular fistula. Patient is poor surgical candidate, but will follow for need of diverting ostomy.        Plan:  - Given the patient's cancer burden and advanced disease, she is a very poor surgical candidate.  Recommend goals of care discussion.    - If patient is amenable, we could offer a palliative diverting loop ileostomy although it would be high risk.  - IV abx  - GI: colonoscopy on Monday  - GYN Onc/ Med Onc evaluation  - colorectal surgery to follow .

## 2020-07-18 NOTE — CONSULT NOTE ADULT - ASSESSMENT
75yo P0 with PMH significant for PE on eliquis, COPD not on home O2,  stage II cervical cancer approx 20-25years ago s/p chemoradiation, h/o stage IIB lobular breast cancer s/p left lumpectomy in 2015, h/o likely endometrial malignancy diagnosed in 2018; with 7cm pelvic mass, hydronephrosis, and colovesical fistula, clinically and hemodynamically stable  -patient declined pelvic exam at initial evaluation, will reassess in AM and re-offer pelvic exam along with endometrial biopsy  -obtain records of heme/onc from [facility]  -obtain collateral information from healthcare proxy, [address] [facility] and Dr. Hanson aware.

## 2020-07-18 NOTE — PROGRESS NOTE ADULT - SUBJECTIVE AND OBJECTIVE BOX
Hospital Day:  7d    Patient is a 74y old  Female who presents with a chief complaint of 7d    Subjective:  pt seen and examined at bedside. She does not report any pain, still having Loose bowel movements. Pt is incontinent.   Catherine still in place, draining feculent fluid.   No fever, no chills.    Past Medical Hx:   Osteoporosis  Obesity  COPD (chronic obstructive pulmonary disease)  Asthma  Diabetes  Hypertension  Breast cancer  Cervical cancer    Past Sx:  History of appendectomy  History of lumpectomy of left breast    Allergies:  iodinated radiocontrast agents (Other)  penicillin (Unknown)    Current Meds:   Standng Meds:  cefepime   IVPB 2000 milliGRAM(s) IV Intermittent once  cefepime   IVPB 2000 milliGRAM(s) IV Intermittent every 12 hours  cefepime   IVPB      chlorhexidine 4% Liquid 1 Application(s) Topical <User Schedule>  enoxaparin Injectable 70 milliGRAM(s) SubCutaneous every 12 hours  lactated ringers Bolus 500 milliLiter(s) IV Bolus once  lactated ringers. 1000 milliLiter(s) (100 mL/Hr) IV Continuous <Continuous>  latanoprost 0.005% Ophthalmic Solution 1 Drop(s) Both EYES at bedtime  loperamide 2 milliGRAM(s) Oral once  montelukast 10 milliGRAM(s) Oral daily  saccharomyces boulardii 250 milliGRAM(s) Oral two times a day  simvastatin 20 milliGRAM(s) Oral at bedtime  tamoxifen 20 milliGRAM(s) Oral daily    PRN Meds:  ALBUTerol    90 MICROgram(s) HFA Inhaler 2 Puff(s) Inhalation every 6 hours PRN Bronchospasm  diphenhydrAMINE 25 milliGRAM(s) Oral every 4 hours PRN Rash and/or Itching    HOME MEDICATIONS:  Advair Diskus 100 mcg-50 mcg inhalation powder: 1 puff(s) inhaled 2 times a day  albuterol 90 mcg/inh inhalation aerosol: 2 puff(s) inhaled 4 times a day, As Needed  alendronate weekly: 70 milligram(s) orally once a week (wednesday)  aspirin 81 mg oral tablet: 1 tab(s) orally once a day  Eliquis 5 mg oral tablet: 1 tab(s) orally 2 times a day  enalapril 10 mg oral tablet: 1 tab(s) orally once a day  latanoprost 0.005% ophthalmic solution: 1 drop(s) to each affected eye once a day (in the evening)  megestrol 20 mg oral tablet: 1 tab(s) orally 4 times a day  metoprolol succinate 25 mg oral tablet, extended release: 1 tab(s) orally once a day  montelukast 10 mg oral tablet: 1 tab(s) orally once a day  simvastatin 20 mg oral tablet: 1 tab(s) orally once a day (at bedtime)  tamoxifen 20 mg oral tablet: 1 tab(s) orally once a day      Vital Signs:   T(F): 98 (07-18-20 @ 08:00), Max: 98 (07-17-20 @ 15:57)  HR: 111 (07-18-20 @ 08:00) (63 - 122)  BP: 82/52 (07-18-20 @ 08:00) (82/52 - 149/85)  RR: 18 (07-18-20 @ 08:00) (18 - 18)  SpO2: --      07-17-20 @ 07:01  -  07-18-20 @ 07:00  --------------------------------------------------------  IN: 2400 mL / OUT: 0 mL / NET: 2400 mL    07-18-20 @ 07:01  -  07-18-20 @ 10:06  --------------------------------------------------------  IN: 240 mL / OUT: 0 mL / NET: 240 mL        Physical Exam:         Labs:                         8.4    11.09 )-----------( 257      ( 17 Jul 2020 08:32 )             27.8       17 Jul 2020 08:32    136    |  104    |  15     ----------------------------<  76     4.0     |  19     |  1.2      Ca    7.8        17 Jul 2020 08:32  Mg     1.9       17 Jul 2020 08:32      Culture - Blood (collected 07-13-20 @ 15:44)  Source: .Blood Blood  Preliminary Report (07-14-20 @ 23:44):    No growth to date.    Radiology:       Assessment and Plan:    75 yo F with PMH of cervical cancer s/p radiation therapy (20 years ago), breast cancer (ER+ / [place]+) s/p lumpectomy + radiation thereby (6 years ago), uterine cancer  s/p chemotherapy ( 3 years ago) as per patient, metastasis to bones,  COPD not on home oxygen, DM, HTN, osteoporosis, DVT on eliquis, Anemia of chronic disease, CKD 3 presents to ED with R sided abdominal pain and diarrhea foudn to have increase in her pelvic mass, along with Hydronephrosis, colovesical fistula.   #RLQ/suprapubic pain 2/2 obstructive pelvic mass    #Right sided hydronephrosis 2/2 pelvic mass with necrosis   #Colovesical fistula   -necrotic pelvic mass seen on imaging from [facility] with possible evidence of fistulization to the cecum (records in chart)Culture   -catherine in place draining what appears to be fecal matter   -uro and gen surg eval noted , possible diversion Ostomy  -seen by IR no perc nephrostomy recommended   -cont pain control  - PRN nausea medicine.   -f/u bcx and urine culture :   growing Proteus from urine cx on 7/15, continue Cefepime.   -still has diuarrhea , C. diff not detected, Will give imodium once  - maintain catherine at this time  - pt cannot have contrast studies given iodine allergy she states when she had contrast in the past she needed to be resuscitated given her adverse rxn   - F/U palliative consult to further speak with pt given her overall prognosis. Pt is still reluctant on making a decision of possible ostomy, colonoscopy  Patient is undecided on whether she wants to pursue surgery. She does not want hospice even     though she understands her disease is advanced and terminal. She wants to remain full code. Palliative consult pending. Will attempt to set up family meeting.   - Colonoscopy on Monday, clear liquids on Sunday, GoLytely at 4pm sunday, dulcolax 20 mg once on Sunday.   - Pt shifted to Lovenox, to receive last dose on Sunday 6pm.  - F/U gyn oncology.  - F/U surgery.  -PT/OT eval    #Decrease Urine output  Not recorded on output but patient has wet pads, possible leak from urine, creatinine is stable. pt not in distress.  Will replace existant catherine today  - F/U urine output  - if no urine output, contact urology    #leukocytosis, possible pyelonephritis  possibly 2/2 necrotic pelvic mass vs pyelo  On  cefepime 2g q12h  - F/U speciation form lab.   monitor fever curve  daily CBC    #chronic anemia likely 2/2 iron deficiency vs. hematuria  cont iron supplementation  trend cbc transfuse if hgb <7  maintain active type and screen   monitor for overt bleeding  -Eliquis was held on admission for hematuria, but resumed with stable hb.   Stopped today and transitioned to Lovenox , last dose on Sunday for colonoscopy on Monday. Reinstate Eliquis after colonoscopy depending on surgery and results of colonoscopy.    #cervical/uterine/breast cancer history  cont tamoxifen, megestrol  -Gyn onc evaluation    #hx of right LE DVT held Eliquis on admission at this time given hematuria and drop in hgb   - resumed Eliquis 5 BID but stopped today, shifted to Lovenox, pending colonoscopy on Monday.   - F/U cbc    FULL code poor overall prognosis   Activity As tolerated  Diet: regular  COde: Full  Dispo: Acute for now    Consultants on board :  F/U Colorectal surgery, GI, palliative, oncologist ( private : Jefferson Fan), Gyn onc, Colonoscopy on Monday Hospital Day:  7d    Patient is a 74y old  Female who presents with a chief complaint of 7d    Subjective:  pt seen and examined at bedside. She does not report any pain, still having Loose bowel movements. Pt is incontinent.   Catherine still in place, draining feculent fluid.   No fever, no chills.    Past Medical Hx:   Osteoporosis  Obesity  COPD (chronic obstructive pulmonary disease)  Asthma  Diabetes  Hypertension  Breast cancer  Cervical cancer    Past Sx:  History of appendectomy  History of lumpectomy of left breast    Allergies:  iodinated radiocontrast agents (Other)  penicillin (Unknown)    Current Meds:   Standng Meds:  cefepime   IVPB 2000 milliGRAM(s) IV Intermittent once  cefepime   IVPB 2000 milliGRAM(s) IV Intermittent every 12 hours  cefepime   IVPB      chlorhexidine 4% Liquid 1 Application(s) Topical <User Schedule>  enoxaparin Injectable 70 milliGRAM(s) SubCutaneous every 12 hours  lactated ringers Bolus 500 milliLiter(s) IV Bolus once  lactated ringers. 1000 milliLiter(s) (100 mL/Hr) IV Continuous <Continuous>  latanoprost 0.005% Ophthalmic Solution 1 Drop(s) Both EYES at bedtime  loperamide 2 milliGRAM(s) Oral once  montelukast 10 milliGRAM(s) Oral daily  saccharomyces boulardii 250 milliGRAM(s) Oral two times a day  simvastatin 20 milliGRAM(s) Oral at bedtime  tamoxifen 20 milliGRAM(s) Oral daily    PRN Meds:  ALBUTerol    90 MICROgram(s) HFA Inhaler 2 Puff(s) Inhalation every 6 hours PRN Bronchospasm  diphenhydrAMINE 25 milliGRAM(s) Oral every 4 hours PRN Rash and/or Itching    HOME MEDICATIONS:  Advair Diskus 100 mcg-50 mcg inhalation powder: 1 puff(s) inhaled 2 times a day  albuterol 90 mcg/inh inhalation aerosol: 2 puff(s) inhaled 4 times a day, As Needed  alendronate weekly: 70 milligram(s) orally once a week (wednesday)  aspirin 81 mg oral tablet: 1 tab(s) orally once a day  Eliquis 5 mg oral tablet: 1 tab(s) orally 2 times a day  enalapril 10 mg oral tablet: 1 tab(s) orally once a day  latanoprost 0.005% ophthalmic solution: 1 drop(s) to each affected eye once a day (in the evening)  megestrol 20 mg oral tablet: 1 tab(s) orally 4 times a day  metoprolol succinate 25 mg oral tablet, extended release: 1 tab(s) orally once a day  montelukast 10 mg oral tablet: 1 tab(s) orally once a day  simvastatin 20 mg oral tablet: 1 tab(s) orally once a day (at bedtime)  tamoxifen 20 mg oral tablet: 1 tab(s) orally once a day      Vital Signs:   T(F): 98 (07-18-20 @ 08:00), Max: 98 (07-17-20 @ 15:57)  HR: 111 (07-18-20 @ 08:00) (63 - 122)  BP: 82/52 (07-18-20 @ 08:00) (82/52 - 149/85)  RR: 18 (07-18-20 @ 08:00) (18 - 18)  SpO2: --      07-17-20 @ 07:01  -  07-18-20 @ 07:00  --------------------------------------------------------  IN: 2400 mL / OUT: 0 mL / NET: 2400 mL    07-18-20 @ 07:01  -  07-18-20 @ 10:06  --------------------------------------------------------  IN: 240 mL / OUT: 0 mL / NET: 240 mL        Physical Exam:         Labs:                         8.4    11.09 )-----------( 257      ( 17 Jul 2020 08:32 )             27.8       17 Jul 2020 08:32    136    |  104    |  15     ----------------------------<  76     4.0     |  19     |  1.2      Ca    7.8        17 Jul 2020 08:32  Mg     1.9       17 Jul 2020 08:32      Culture - Blood (collected 07-13-20 @ 15:44)  Source: .Blood Blood  Preliminary Report (07-14-20 @ 23:44):    No growth to date.    Radiology:       Assessment and Plan:    75 yo F with PMH of cervical cancer s/p radiation therapy (20 years ago), breast cancer (ER+ / [place]+) s/p lumpectomy + radiation thereby (6 years ago), uterine cancer  s/p chemotherapy ( 3 years ago) as per patient, metastasis to bones,  COPD not on home oxygen, DM, HTN, osteoporosis, DVT on eliquis, Anemia of chronic disease, CKD 3 presents to ED with R sided abdominal pain and diarrhea foudn to have increase in her pelvic mass, along with Hydronephrosis, colovesical fistula.   #RLQ/suprapubic pain 2/2 obstructive pelvic mass    #Right sided hydronephrosis 2/2 pelvic mass with necrosis   #Colovesical fistula   -necrotic pelvic mass seen on imaging from [facility] with possible evidence of fistulization to the cecum (records in chart)Culture   -catherine in place draining what appears to be fecal matter   -uro and gen surg eval noted , possible diversion Ostomy  -seen by IR no perc nephrostomy recommended   -cont pain control  - PRN nausea medicine.   -f/u bcx and urine culture :   growing Proteus from urine cx on 7/15, continue Cefepime.   -still has diuarrhea , C. diff not detected, Will give imodium once  - maintain catherine at this time  - pt cannot have contrast studies given iodine allergy she states when she had contrast in the past she needed to be resuscitated given her adverse rxn   - F/U palliative consult to further speak with pt given her overall prognosis. Pt is still reluctant on making a decision of possible ostomy, colonoscopy  Patient is undecided on whether she wants to pursue surgery. She does not want hospice even     though she understands her disease is advanced and terminal. She wants to remain full code. Palliative consult pending. Will attempt to set up family meeting.   - Colonoscopy on Monday, clear liquids on Sunday, GoLytely at 4pm sunday, dulcolax 20 mg once on Sunday.   - Pt shifted to Lovenox, to receive last dose on Sunday 6pm.  - F/U gyn oncology.  - F/U surgery.  -PT/OT eval  - F/U cardiology clearance, Echo cardio.  - Needs medical clearance    #Decrease Urine output  Not recorded on output but patient has wet pads, possible leak from urine, creatinine is stable. pt not in distress.  Will replace existant catherine today  - F/U urine output  - if no urine output, contact urology    #leukocytosis, possible pyelonephritis  possibly 2/2 necrotic pelvic mass vs pyelo  On  cefepime 2g q12h  - F/U speciation form lab.   monitor fever curve  daily CBC    #chronic anemia likely 2/2 iron deficiency vs. hematuria  cont iron supplementation  trend cbc transfuse if hgb <7  maintain active type and screen   monitor for overt bleeding  -Eliquis was held on admission for hematuria, but resumed with stable hb.   Stopped today and transitioned to Lovenox , last dose on Sunday for colonoscopy on Monday. Reinstate Eliquis after colonoscopy depending on surgery and results of colonoscopy.    #cervical/uterine/breast cancer history  cont tamoxifen, megestrol  -Gyn onc evaluation    #hx of right LE DVT held Eliquis on admission at this time given hematuria and drop in hgb   - resumed Eliquis 5 BID but stopped today, shifted to Lovenox, pending colonoscopy on Monday.   - F/U cbc    FULL code poor overall prognosis   Activity As tolerated  Diet: regular  COde: Full  Dispo: Acute for now    Consultants on board :  F/U Colorectal surgery, GI, palliative, oncologist ( private : Jefferson Fan), Gyn onc, Colonoscopy on Monday Hospital Day:  7d    Patient is a 74y old  Female who presents with a chief complaint of 7d    Subjective:  pt seen and examined at bedside. She does not report any pain, still having Loose bowel movements. Pt is incontinent.   Catherine still in place, draining feculent fluid.   No fever, no chills.    Past Medical Hx:   Osteoporosis  Obesity  COPD (chronic obstructive pulmonary disease)  Asthma  Diabetes  Hypertension  Breast cancer  Cervical cancer    Past Sx:  History of appendectomy  History of lumpectomy of left breast    Allergies:  iodinated radiocontrast agents (Other)  penicillin (Unknown)    Current Meds:   Standng Meds:  cefepime   IVPB 2000 milliGRAM(s) IV Intermittent once  cefepime   IVPB 2000 milliGRAM(s) IV Intermittent every 12 hours  cefepime   IVPB      chlorhexidine 4% Liquid 1 Application(s) Topical <User Schedule>  enoxaparin Injectable 70 milliGRAM(s) SubCutaneous every 12 hours  lactated ringers Bolus 500 milliLiter(s) IV Bolus once  lactated ringers. 1000 milliLiter(s) (100 mL/Hr) IV Continuous <Continuous>  latanoprost 0.005% Ophthalmic Solution 1 Drop(s) Both EYES at bedtime  loperamide 2 milliGRAM(s) Oral once  montelukast 10 milliGRAM(s) Oral daily  saccharomyces boulardii 250 milliGRAM(s) Oral two times a day  simvastatin 20 milliGRAM(s) Oral at bedtime  tamoxifen 20 milliGRAM(s) Oral daily    PRN Meds:  ALBUTerol    90 MICROgram(s) HFA Inhaler 2 Puff(s) Inhalation every 6 hours PRN Bronchospasm  diphenhydrAMINE 25 milliGRAM(s) Oral every 4 hours PRN Rash and/or Itching    HOME MEDICATIONS:  Advair Diskus 100 mcg-50 mcg inhalation powder: 1 puff(s) inhaled 2 times a day  albuterol 90 mcg/inh inhalation aerosol: 2 puff(s) inhaled 4 times a day, As Needed  alendronate weekly: 70 milligram(s) orally once a week (wednesday)  aspirin 81 mg oral tablet: 1 tab(s) orally once a day  Eliquis 5 mg oral tablet: 1 tab(s) orally 2 times a day  enalapril 10 mg oral tablet: 1 tab(s) orally once a day  latanoprost 0.005% ophthalmic solution: 1 drop(s) to each affected eye once a day (in the evening)  megestrol 20 mg oral tablet: 1 tab(s) orally 4 times a day  metoprolol succinate 25 mg oral tablet, extended release: 1 tab(s) orally once a day  montelukast 10 mg oral tablet: 1 tab(s) orally once a day  simvastatin 20 mg oral tablet: 1 tab(s) orally once a day (at bedtime)  tamoxifen 20 mg oral tablet: 1 tab(s) orally once a day      Vital Signs:   T(F): 98 (07-18-20 @ 08:00), Max: 98 (07-17-20 @ 15:57)  HR: 111 (07-18-20 @ 08:00) (63 - 122)  BP: 82/52 (07-18-20 @ 08:00) (82/52 - 149/85)  RR: 18 (07-18-20 @ 08:00) (18 - 18)  SpO2: --      07-17-20 @ 07:01  -  07-18-20 @ 07:00  --------------------------------------------------------  IN: 2400 mL / OUT: 0 mL / NET: 2400 mL    07-18-20 @ 07:01  -  07-18-20 @ 10:06  --------------------------------------------------------  IN: 240 mL / OUT: 0 mL / NET: 240 mL        Physical Exam:         Labs:                         8.4    11.09 )-----------( 257      ( 17 Jul 2020 08:32 )             27.8       17 Jul 2020 08:32    136    |  104    |  15     ----------------------------<  76     4.0     |  19     |  1.2      Ca    7.8        17 Jul 2020 08:32  Mg     1.9       17 Jul 2020 08:32      Culture - Blood (collected 07-13-20 @ 15:44)  Source: .Blood Blood  Preliminary Report (07-14-20 @ 23:44):    No growth to date.    Radiology:       Assessment and Plan:    75 yo F with PMH of cervical cancer s/p radiation therapy (20 years ago), breast cancer (ER+ / [place]+) s/p lumpectomy + radiation thereby (6 years ago), uterine cancer  s/p chemotherapy ( 3 years ago) as per patient, metastasis to bones,  COPD not on home oxygen, DM, HTN, osteoporosis, DVT on eliquis, Anemia of chronic disease, CKD 3 presents to ED with R sided abdominal pain and diarrhea foudn to have increase in her pelvic mass, along with Hydronephrosis, colovesical fistula.   #RLQ/suprapubic pain 2/2 obstructive pelvic mass    #Right sided hydronephrosis 2/2 pelvic mass with necrosis   #Colovesical fistula   -necrotic pelvic mass seen on imaging from [facility] with possible evidence of fistulization to the cecum (records in chart)Culture   -catherine in place draining what appears to be fecal matter   -uro and gen surg eval noted , possible diversion Ostomy  -seen by IR no perc nephrostomy recommended   -cont pain control  - PRN nausea medicine.   -f/u bcx and urine culture :   growing Proteus from urine cx on 7/15, continue Cefepime.   -still has diuarrhea , C. diff not detected, Will give imodium once  - maintain catherine at this time  - pt cannot have contrast studies given iodine allergy she states when she had contrast in the past she needed to be resuscitated given her adverse rxn   - F/U palliative consult to further speak with pt given her overall prognosis. Pt is still reluctant on making a decision of possible ostomy, colonoscopy  Patient is undecided on whether she wants to pursue surgery. She does not want hospice even     though she understands her disease is advanced and terminal. She wants to remain full code. Palliative consult pending. Will attempt to set up family meeting.   - Colonoscopy on Monday, clear liquids on Sunday, GoLytely at 4pm sunday, dulcolax 20 mg once on Sunday.   - Pt shifted to Lovenox, to receive last dose on Sunday 6pm.  - F/U gyn oncology.  - F/U surgery.  -PT/OT eval  - F/U cardiology clearance, Echo cardio.  - Needs medical clearance    #Decrease Urine output  Not recorded on output but patient has wet pads, possible leak from urine, creatinine is stable. pt not in distress.  Will replace existant catherine today  - F/U urine output  - if no urine output, contact urology    #leukocytosis, possible pyelonephritis  possibly 2/2 necrotic pelvic mass vs pyelo  On  cefepime 2g q12h  - F/U urine culture   monitor fever curve  daily CBC    #chronic anemia likely 2/2 iron deficiency vs. hematuria  cont iron supplementation  trend cbc transfuse if hgb <7  maintain active type and screen   monitor for overt bleeding  -Eliquis was held on admission for hematuria, but resumed with stable hb.   Stopped today and transitioned to Lovenox , last dose on Sunday for colonoscopy on Monday. Reinstate Eliquis after colonoscopy depending on surgery and results of colonoscopy.    #cervical/uterine/breast cancer history  cont tamoxifen, megestrol  -Gyn onc evaluation    #hx of right LE DVT held Eliquis on admission at this time given hematuria and drop in hgb   - resumed Eliquis 5 BID but stopped today, shifted to Lovenox, pending colonoscopy on Monday.   - F/U cbc    FULL code poor overall prognosis   Activity As tolerated  Diet: regular  COde: Full  Dispo: Acute for now    Consultants on board :  F/U Colorectal surgery, GI, palliative, oncologist ( private : Jefferson Fan), Gyn onc, Colonoscopy on Monday

## 2020-07-18 NOTE — CHART NOTE - NSCHARTNOTEFT_GEN_A_CORE
Patient seen at bedside.   Ammenable to pelvic exam, endometrial/ cervical biopsy in afternoon.     Will continue to evaluate, Dr. Jesus to be aware Patient seen at bedside.   Ammenable to pelvic exam.    Per [facility], known history of malignancy. Will await records. Records received from [facility]:    Follows with   -Dr. Smith Hem/Onc  -Dr. Salbador Cerrato Rad Onc    Metasatic endometrial cancer- on tamoxifen and megstrol  Hx of cervical cancer 15yo.   Left sided breast cancer s/p lumpectomy and radiation     s/p pericentesis 8/2/18 pelvic fluid- malignant cells present  s/p left iliac sclerotic lesion, core biopsy: negative for malignant cells   s/p uterine mass fine needle aspiration ([id number])- malignant epithiliod neoplasm - p53+, ER +, [place] +; PAX8+, Vimentin +, CK7+ Records received from [facility]:    Follows with   -Dr. Smith Hem/Onc  -Dr. Salbador Cerrato Rad Onc    Metastatic endometrial cancer- on tamoxifen and megestrol  Hx of cervical cancer 17yo.   Left sided breast cancer s/p lumpectomy and radiation     s/p pericentesis 8/2/18 pelvic fluid- malignant cells present  s/p left iliac sclerotic lesion, core biopsy: negative for malignant cells   s/p uterine mass fine needle aspiration ([id number])- malignant epitheloid neoplasm - p53+, ER +, LA +; PAX8+, Vimentin +, CK7+      GYN To defer pelvic exam at this time, patient to follow with GYN/ONC, RAD/ONC and Hem/ONC.

## 2020-07-18 NOTE — CONSULT NOTE ADULT - SUBJECTIVE AND OBJECTIVE BOX
Chief Complaint: RLQ pain    HPI: 75yo P0 with PMH significant for PE on eliquis, COPD not on home O2,  cervical cancer approx 20-25years ago s/p chemoradiation, h/o breast cancer s/p left lumpectomy, h/o uterine cancer diagnosed in 2018, now admitted with 7cm pelvic mass and hydronephrosis.  Patient presenting to hospital from nursing home on 7/11 for intermittent RLQ pain which is now resolved.   Also with multiple loss stools per day, C.diff negative during this hospital admission.  Reports significant weight loss in the last year but unable to quanitfy amount.   Denies HA, CP, SOB, N/V, constipation, dysuria, hematuria, vaginal bleeding, vaginal discharge, abdominal pain. Denies night sweats, malaise, fevers, chills.      History of malignancy is as follows:  PET-CT in 2018 was concerning for malignancy. CT-guided biopsy in 2018 showed adenocarcinoma for [place] 8, vimentin, CK7 and negative for CA and CK20 indicating mullerian tract origin, possible endometrial origin per pathology. Bony lesions were also biopsied and not consistent with malignancy.  She received carboplatin and paclitaxel as neoadjuvant chemo in 2018.  In Jan 2019, patient was admitted for symptomatic hypocalcemia and hypomagnesiemia.  CT at that time suggested progressive osteoblastic metas and omental mets since prior CT in 2018.  Due to abnormal chemistry findings after chemotherapy, patient declined at that time to continue chemotherapy.  She was started on megestrol and tamoxifen in February 2019.  High suspicion for PE in 2019, however patient declined imaging and was discharged from the hospital without anticoaulation.  At that time, patient stopped tamoxifen and restarted chemo with carboplatin and paclitaxel.  In July 2019, patient presented to [facility] with N/V and fever, CT at that time showed pelvic mass measuring 12cm.  In July 2019, she was diagnosed with DVT and intermediate probability of PE on V/Q scan, she was started on Eliquis.  She was switched back to megestrol and tamoxifen in 8/2019.  Most recently recieved palliative radition to the pelvic mass due to occasional vaginal spotting.      Ob/Gyn History:  G0                 LMP 31 years old  Denies history of ovarian cysts, uterine fibroids, abnormal paps, or STIs  Last Pap Smear -   Last Mammogram  Last colonoscopy     Denies the following: constitutional symptoms, visual symptoms, cardiovascular symptoms, respiratory symptoms, GI symptoms, musculoskeletal symptoms, skin symptoms, neurologic symptoms, hematologic symptoms, allergic symptoms, psychiatric symptoms  Except any pertinent positives listed.     Home Medications:  Advair Diskus 100 mcg-50 mcg inhalation powder: 1 puff(s) inhaled 2 times a day (12 Jul 2020 01:43)  albuterol 90 mcg/inh inhalation aerosol: 2 puff(s) inhaled 4 times a day, As Needed (12 Jul 2020 01:43)  alendronate weekly: 70 milligram(s) orally once a week (wednesday) (12 Jul 2020 01:43)  aspirin 81 mg oral tablet: 1 tab(s) orally once a day (12 Jul 2020 01:43)  Eliquis 5 mg oral tablet: 1 tab(s) orally 2 times a day (12 Jul 2020 01:43)  enalapril 10 mg oral tablet: 1 tab(s) orally once a day (12 Jul 2020 01:43)  latanoprost 0.005% ophthalmic solution: 1 drop(s) to each affected eye once a day (in the evening) (12 Jul 2020 01:43)  megestrol 20 mg oral tablet: 1 tab(s) orally 4 times a day (12 Jul 2020 01:43)  metoprolol succinate 25 mg oral tablet, extended release: 1 tab(s) orally once a day (12 Jul 2020 01:43)  montelukast 10 mg oral tablet: 1 tab(s) orally once a day (12 Jul 2020 01:43)  simvastatin 20 mg oral tablet: 1 tab(s) orally once a day (at bedtime) (12 Jul 2020 01:43)  tamoxifen 20 mg oral tablet: 1 tab(s) orally once a day (12 Jul 2020 01:43)    Allergies    iodinated radiocontrast agents (Other)  penicillin (Unknown)  Intolerances    PAST MEDICAL HISTORY:  Osteoporosis  Obesity: bmi 41  COPD (chronic obstructive pulmonary disease)  Asthma: last attack 1/2018- hospitalized & steroids  Diabetes  Hypertension  Breast cancer: left  Cervical cancer: radiation & chemo      PAST SURGICAL HISTORY:  History of appendectomy  History of lumpectomy of left breast    FAMILY HISTORY:  Family history of breast cancer (Mother, Sibling): mother&amp; sister    SOCIAL HISTORY: Denies cigarette use, alcohol use, or illicit drug use    Vital Signs Last 24 Hrs  T(F): 98 (17 Jul 2020 15:57), Max: 98 (17 Jul 2020 08:00)  HR: 63 (17 Jul 2020 15:57) (63 - 122)  BP: 105/54 (17 Jul 2020 15:57) (91/54 - 149/85)  RR: 18 (17 Jul 2020 15:57) (18 - 18)    General Appearance - AAOx3, NAD  Heart - S1S2 regular rate and rhythm  Lung - CTA Bilaterally  Abdomen - Soft, nontender, nondistended, no rebound, no rigidity, no guarding, bowel sounds present    GYN/Pelvis: Patient declined at this time.    Meds:   apixaban 5 milliGRAM(s) Oral two times a day  bisacodyl 20 milliGRAM(s) Oral once  cefepime   IVPB 2000 milliGRAM(s) IV Intermittent once  iohexol 300 mG (iodine)/mL Oral Solution 30 milliLiter(s) Oral once  lactated ringers Bolus 500 milliLiter(s) IV Bolus once  lactated ringers Bolus 250 milliLiter(s) IV Bolus once  lactated ringers Bolus 250 milliLiter(s) IV Bolus once  magnesium sulfate  IVPB 2 Gram(s) IV Intermittent every 2 hours  methylPREDNISolone sodium succinate Injectable 125 milliGRAM(s) IV Push Once  ondansetron Injectable 4 milliGRAM(s) IV Push once  sodium chloride 0.9% Bolus 500 milliLiter(s) IV Bolus once  sodium chloride 0.9% Bolus 1000 milliLiter(s) IV Bolus once      LABS:                        8.4    11.09 )-----------( 257      ( 17 Jul 2020 08:32 )             27.8         07-17    136  |  104  |  15  ----------------------------<  76  4.0   |  19  |  1.2    Ca    7.8<L>      17 Jul 2020 08:32  Mg     1.9     07-17        Culture - Urine (collected 07-15-20 @ 12:20)  Source: .Urine Catheterized  Final Report (07-17-20 @ 15:18):    >100,000 CFU/ml Proteus mirabilis    Susceptibility to follow.    Culture - Urine (collected 07-11-20 @ 21:20)  Source: .Urine Clean Catch (Midstream)  Final Report (07-13-20 @ 09:28):    >=3 organisms. Probable collection contamination.        RADIOLOGY & ADDITIONAL STUDIES: Chief Complaint: RLQ pain    HPI: 73yo P0 with PMH significant for PE on eliquis, COPD not on home O2,  cervical cancer approx 20-25years ago s/p chemoradiation, h/o breast cancer s/p left lumpectomy, h/o uterine cancer diagnosed in 2018, now admitted with 7cm pelvic mass and hydronephrosis.  Patient presenting to hospital from nursing home on 7/11 for intermittent RLQ pain which is now resolved.   Also with multiple loss stools per day, C.diff negative during this hospital admission.  Reports significant weight loss in the last year but unable to quanitfy amount.   Denies HA, CP, SOB, N/V, constipation, dysuria, hematuria, vaginal bleeding, vaginal discharge, abdominal pain. Denies night sweats, malaise, fevers, chills.      History of malignancy is as follows:  PET-CT in 2018 was concerning for malignancy. CT-guided biopsy in 2018 showed adenocarcinoma for [place] 8, vimentin, CK7 and negative for CA and CK20 indicating mullerian tract origin, possible endometrial origin per pathology. Bony lesions were also biopsied and not consistent with malignancy.  She received carboplatin and paclitaxel as neoadjuvant chemo in 2018.  In Jan 2019, patient was admitted for symptomatic hypocalcemia and hypomagnesiemia.  CT at that time suggested progressive osteoblastic metas and omental mets since prior CT in 2018.  Due to abnormal chemistry findings after chemotherapy, patient declined at that time to continue chemotherapy.  She was started on megestrol and tamoxifen in February 2019.  High suspicion for PE in 2019, however patient declined imaging and was discharged from the hospital without anticoaulation.  At that time, patient stopped tamoxifen and restarted chemo with carboplatin and paclitaxel.  In July 2019, patient presented to [facility] with N/V and fever, CT at that time showed pelvic mass measuring 12cm.  In July 2019, she was diagnosed with DVT and intermediate probability of PE on V/Q scan, she was started on Eliquis.  She was switched back to megestrol and tamoxifen in 8/2019.  Most recently recieved palliative radition to the pelvic mass due to occasional vaginal spotting.      Ob/Gyn History:  G0                 LMP 31 years old  Denies history of ovarian cysts, uterine fibroids, abnormal paps, or STIs  Last Pap Smear -   Last Mammogram  Last colonoscopy     Denies the following: constitutional symptoms, visual symptoms, cardiovascular symptoms, respiratory symptoms, GI symptoms, musculoskeletal symptoms, skin symptoms, neurologic symptoms, hematologic symptoms, allergic symptoms, psychiatric symptoms  Except any pertinent positives listed.     Home Medications:  Advair Diskus 100 mcg-50 mcg inhalation powder: 1 puff(s) inhaled 2 times a day (12 Jul 2020 01:43)  albuterol 90 mcg/inh inhalation aerosol: 2 puff(s) inhaled 4 times a day, As Needed (12 Jul 2020 01:43)  alendronate weekly: 70 milligram(s) orally once a week (wednesday) (12 Jul 2020 01:43)  aspirin 81 mg oral tablet: 1 tab(s) orally once a day (12 Jul 2020 01:43)  Eliquis 5 mg oral tablet: 1 tab(s) orally 2 times a day (12 Jul 2020 01:43)  enalapril 10 mg oral tablet: 1 tab(s) orally once a day (12 Jul 2020 01:43)  latanoprost 0.005% ophthalmic solution: 1 drop(s) to each affected eye once a day (in the evening) (12 Jul 2020 01:43)  megestrol 20 mg oral tablet: 1 tab(s) orally 4 times a day (12 Jul 2020 01:43)  metoprolol succinate 25 mg oral tablet, extended release: 1 tab(s) orally once a day (12 Jul 2020 01:43)  montelukast 10 mg oral tablet: 1 tab(s) orally once a day (12 Jul 2020 01:43)  simvastatin 20 mg oral tablet: 1 tab(s) orally once a day (at bedtime) (12 Jul 2020 01:43)  tamoxifen 20 mg oral tablet: 1 tab(s) orally once a day (12 Jul 2020 01:43)    Allergies    iodinated radiocontrast agents (Other)  penicillin (Unknown)  Intolerances    PAST MEDICAL HISTORY:  Osteoporosis  Obesity: bmi 41  COPD (chronic obstructive pulmonary disease)  Asthma: last attack 1/2018- hospitalized & steroids  Diabetes  Hypertension  Breast cancer: left  Cervical cancer: radiation & chemo      PAST SURGICAL HISTORY:  History of appendectomy  History of lumpectomy of left breast    FAMILY HISTORY:  Family history of breast cancer (Mother, Sibling): mother&amp; sister    SOCIAL HISTORY: Denies cigarette use, alcohol use, or illicit drug use    Vital Signs Last 24 Hrs  T(F): 98 (17 Jul 2020 15:57), Max: 98 (17 Jul 2020 08:00)  HR: 63 (17 Jul 2020 15:57) (63 - 122)  BP: 105/54 (17 Jul 2020 15:57) (91/54 - 149/85)  RR: 18 (17 Jul 2020 15:57) (18 - 18)    General Appearance - AAOx3, NAD  Heart - S1S2 regular rate and rhythm  Lung - CTA Bilaterally  Abdomen - Soft, nontender, nondistended, no rebound, no rigidity, no guarding, bowel sounds present    GYN/Pelvis: Patient declined at this time.    Meds:   apixaban 5 milliGRAM(s) Oral two times a day  bisacodyl 20 milliGRAM(s) Oral once  cefepime   IVPB 2000 milliGRAM(s) IV Intermittent once  iohexol 300 mG (iodine)/mL Oral Solution 30 milliLiter(s) Oral once  lactated ringers Bolus 500 milliLiter(s) IV Bolus once  lactated ringers Bolus 250 milliLiter(s) IV Bolus once  lactated ringers Bolus 250 milliLiter(s) IV Bolus once  magnesium sulfate  IVPB 2 Gram(s) IV Intermittent every 2 hours  methylPREDNISolone sodium succinate Injectable 125 milliGRAM(s) IV Push Once  ondansetron Injectable 4 milliGRAM(s) IV Push once  sodium chloride 0.9% Bolus 500 milliLiter(s) IV Bolus once  sodium chloride 0.9% Bolus 1000 milliLiter(s) IV Bolus once      LABS:                        8.4    11.09 )-----------( 257      ( 17 Jul 2020 08:32 )             27.8         07-17    136  |  104  |  15  ----------------------------<  76  4.0   |  19  |  1.2    Ca    7.8<L>      17 Jul 2020 08:32  Mg     1.9     07-17    Culture - Urine (collected 07-15-20 @ 12:20)  Source: .Urine Catheterized  Final Report (07-17-20 @ 15:18):    >100,000 CFU/ml Proteus mirabilis    Susceptibility to follow.    Culture - Urine (collected 07-11-20 @ 21:20)  Source: .Urine Clean Catch (Midstream)  Final Report (07-13-20 @ 09:28):    >=3 organisms. Probable collection contamination.        RADIOLOGY & ADDITIONAL STUDIES:    EXAM:  CT ABDOMEN AND PELVIS            PROCEDURE DATE:  07/11/2020    INTERPRETATION:  CLINICAL STATEMENT:  TECHNIQUE: Contiguous axial CT images were obtained from the lower chest to the pubic symphysis without intravenous contrast.  Oral contrast was not administered.  Reformatted images in the coronal and sagittal planes were acquired.  COMPARISON CT: CT abdomen and pelvis 1/22/2018  OTHER STUDIES USED FOR CORRELATION: None.   FINDINGS:  LOWER CHEST: Trace right and small left-sided pleural effusions. Coronary artery calcifications.  HEPATOBILIARY: 1.8 cm low density lesion lateral segment left hepatic lobe which appears new and further evaluation with ultrasound and/or contrast CT suggested  SPLEEN: Unremarkable.  PANCREAS: Unremarkable.  ADRENAL GLANDS: Unremarkable.  KIDNEYS: Severe right hydronephrosis and dilatation of the right ureter suggesting obstruction secondary to encasement of the right ureter by the pelvic mass.  ABDOMINOPELVIC NODES: Decreased left para-aortic predominantly fat 3 x 1.5 cm retroperitoneal structure, likely fatty lymph node.  PELVIC ORGANS: Intra-abdominal pelvic ascites obscures underlying pelvic organs. Decreased, previously 11.7 cm, pelvic mass now measuring 7 x3.2 x 6 cm and containing air-fluid levels.  PERITONEUM/MESENTERY/BOWEL: Multiple small bowel loops containing air-fluid levels without distention or obvious point of obstruction.   BONES/SOFT TISSUES: Diffuse osseous sclerotic lesions throughoutthe bony pelvis and thoracolumbar spine..  OTHER:  IMPRESSION:  Since January 22, 2018;  Previously 11.7 cm pelvic mass now measuring 7 cm and containing air-fluid levels. Findings may reflect necrotic component versus postsurgical change.  Hepatic lesion and ultrasound suggested  Moderate ascites  .Severe right hydronephrosis and dilatation of the right ureter suggesting obstruction secondary to encasement of the right ureter by the pelvic mass.  Multiple small bowel loops containing air-fluid levels without distention or obvious point of obstruction.  Diffuse osseous sclerotic lesions throughout the bony pelvis and thoracolumbar spine suggesting metastatic bone disease. Chief Complaint: RLQ pain    HPI: 73yo P0 with PMH significant for PE on eliquis, COPD not on home O2,  cervical cancer approx 20-25years ago s/p chemoradiation, h/o breast cancer s/p left lumpectomy in 2015, h/o likely endometrial malignancy diagnosed in 2018, now admitted with 7cm pelvic mass and hydronephrosis.  Patient presenting to hospital from nursing home on 7/11 for intermittent RLQ pain which is now resolved.   Also with multiple loss stools per day, C.diff negative during this hospital admission.  Reports significant weight loss in the last year but unable to quanitfy amount.   Denies HA, CP, SOB, N/V, constipation, dysuria, hematuria, vaginal bleeding, vaginal discharge, abdominal pain. Denies night sweats, malaise, fevers, chills.      History of current pelvic mass is as follows:  PET-CT in 2018 was concerning for malignancy. CT-guided biopsy in 2018 showed adenocarcinoma for [place] 8, vimentin, CK7 and negative for CA and CK20 indicating mullerian tract origin, possible endometrial origin per pathology. Bony lesions were also biopsied and not consistent with malignancy.  She received carboplatin and paclitaxel as neoadjuvant chemo in 2018.  In Jan 2019, patient was admitted for symptomatic hypocalcemia and hypomagnesiemia.  CT at that time suggested progressive osteoblastic metas and omental metastasis since prior CT in 2018.  Due to abnormal chemistry findings after chemotherapy, patient declined at that time to continue chemotherapy.  She was started on megestrol and tamoxifen in February 2019.  Admitted to [facility] with high suspicion for PE in 2019, however patient declined imaging and was discharged from the hospital without anticoagulation.  At that time, patient stopped tamoxifen and restarted chemo with carboplatin and paclitaxel.  In July 2019, patient presented to [facility] with N/V and fever, CT at that time showed pelvic mass measuring 12cm.  In July 2019, she was diagnosed with DVT and intermediate probability of PE on V/Q scan, she was started on Eliquis.  She was switched back to megestrol and tamoxifen in 8/2019.  Most recently recieved palliative radition to the pelvic mass due to occasional vaginal spotting.      Ob/Gyn History:  G0                 LMP 31 years old  Denies history of ovarian cysts, uterine fibroids, abnormal paps, or STIs  Last Pap Smear -   Last Mammogram  Last colonoscopy     Denies the following: constitutional symptoms, visual symptoms, cardiovascular symptoms, respiratory symptoms, GI symptoms, musculoskeletal symptoms, skin symptoms, neurologic symptoms, hematologic symptoms, allergic symptoms, psychiatric symptoms  Except any pertinent positives listed.     Home Medications:  Advair Diskus 100 mcg-50 mcg inhalation powder: 1 puff(s) inhaled 2 times a day (12 Jul 2020 01:43)  albuterol 90 mcg/inh inhalation aerosol: 2 puff(s) inhaled 4 times a day, As Needed (12 Jul 2020 01:43)  alendronate weekly: 70 milligram(s) orally once a week (wednesday) (12 Jul 2020 01:43)  aspirin 81 mg oral tablet: 1 tab(s) orally once a day (12 Jul 2020 01:43)  Eliquis 5 mg oral tablet: 1 tab(s) orally 2 times a day (12 Jul 2020 01:43)  enalapril 10 mg oral tablet: 1 tab(s) orally once a day (12 Jul 2020 01:43)  latanoprost 0.005% ophthalmic solution: 1 drop(s) to each affected eye once a day (in the evening) (12 Jul 2020 01:43)  megestrol 20 mg oral tablet: 1 tab(s) orally 4 times a day (12 Jul 2020 01:43)  metoprolol succinate 25 mg oral tablet, extended release: 1 tab(s) orally once a day (12 Jul 2020 01:43)  montelukast 10 mg oral tablet: 1 tab(s) orally once a day (12 Jul 2020 01:43)  simvastatin 20 mg oral tablet: 1 tab(s) orally once a day (at bedtime) (12 Jul 2020 01:43)  tamoxifen 20 mg oral tablet: 1 tab(s) orally once a day (12 Jul 2020 01:43)    Allergies    iodinated radiocontrast agents (Other)  penicillin (Unknown)  Intolerances    PAST MEDICAL HISTORY:  Osteoporosis  Obesity: bmi 41  COPD (chronic obstructive pulmonary disease)  Asthma: last attack 1/2018- hospitalized & steroids  Diabetes  Hypertension  Breast cancer: left  Cervical cancer: radiation & chemo      PAST SURGICAL HISTORY:  History of appendectomy  History of lumpectomy of left breast    FAMILY HISTORY:  Family history of breast cancer (Mother, Sibling): mother&amp; sister    SOCIAL HISTORY: Denies cigarette use, alcohol use, or illicit drug use    Vital Signs Last 24 Hrs  T(F): 98 (17 Jul 2020 15:57), Max: 98 (17 Jul 2020 08:00)  HR: 63 (17 Jul 2020 15:57) (63 - 122)  BP: 105/54 (17 Jul 2020 15:57) (91/54 - 149/85)  RR: 18 (17 Jul 2020 15:57) (18 - 18)    General Appearance - AAOx3, NAD  Heart - S1S2 regular rate and rhythm  Lung - CTA Bilaterally  Abdomen - Soft, nontender, nondistended, no rebound, no rigidity, no guarding, bowel sounds present    GYN/Pelvis: Patient declined at this time.    Meds:   apixaban 5 milliGRAM(s) Oral two times a day  bisacodyl 20 milliGRAM(s) Oral once  cefepime   IVPB 2000 milliGRAM(s) IV Intermittent once  iohexol 300 mG (iodine)/mL Oral Solution 30 milliLiter(s) Oral once  lactated ringers Bolus 500 milliLiter(s) IV Bolus once  lactated ringers Bolus 250 milliLiter(s) IV Bolus once  lactated ringers Bolus 250 milliLiter(s) IV Bolus once  magnesium sulfate  IVPB 2 Gram(s) IV Intermittent every 2 hours  methylPREDNISolone sodium succinate Injectable 125 milliGRAM(s) IV Push Once  ondansetron Injectable 4 milliGRAM(s) IV Push once  sodium chloride 0.9% Bolus 500 milliLiter(s) IV Bolus once  sodium chloride 0.9% Bolus 1000 milliLiter(s) IV Bolus once      LABS:                        8.4    11.09 )-----------( 257      ( 17 Jul 2020 08:32 )             27.8         07-17    136  |  104  |  15  ----------------------------<  76  4.0   |  19  |  1.2    Ca    7.8<L>      17 Jul 2020 08:32  Mg     1.9     07-17    Culture - Urine (collected 07-15-20 @ 12:20)  Source: .Urine Catheterized  Final Report (07-17-20 @ 15:18):    >100,000 CFU/ml Proteus mirabilis    Susceptibility to follow.    Culture - Urine (collected 07-11-20 @ 21:20)  Source: .Urine Clean Catch (Midstream)  Final Report (07-13-20 @ 09:28):    >=3 organisms. Probable collection contamination.        RADIOLOGY & ADDITIONAL STUDIES:    EXAM:  CT ABDOMEN AND PELVIS            PROCEDURE DATE:  07/11/2020    INTERPRETATION:  CLINICAL STATEMENT:  TECHNIQUE: Contiguous axial CT images were obtained from the lower chest to the pubic symphysis without intravenous contrast.  Oral contrast was not administered.  Reformatted images in the coronal and sagittal planes were acquired.  COMPARISON CT: CT abdomen and pelvis 1/22/2018  OTHER STUDIES USED FOR CORRELATION: None.   FINDINGS:  LOWER CHEST: Trace right and small left-sided pleural effusions. Coronary artery calcifications.  HEPATOBILIARY: 1.8 cm low density lesion lateral segment left hepatic lobe which appears new and further evaluation with ultrasound and/or contrast CT suggested  SPLEEN: Unremarkable.  PANCREAS: Unremarkable.  ADRENAL GLANDS: Unremarkable.  KIDNEYS: Severe right hydronephrosis and dilatation of the right ureter suggesting obstruction secondary to encasement of the right ureter by the pelvic mass.  ABDOMINOPELVIC NODES: Decreased left para-aortic predominantly fat 3 x 1.5 cm retroperitoneal structure, likely fatty lymph node.  PELVIC ORGANS: Intra-abdominal pelvic ascites obscures underlying pelvic organs. Decreased, previously 11.7 cm, pelvic mass now measuring 7 x3.2 x 6 cm and containing air-fluid levels.  PERITONEUM/MESENTERY/BOWEL: Multiple small bowel loops containing air-fluid levels without distention or obvious point of obstruction.   BONES/SOFT TISSUES: Diffuse osseous sclerotic lesions throughoutthe bony pelvis and thoracolumbar spine..  OTHER:  IMPRESSION:  Since January 22, 2018;  Previously 11.7 cm pelvic mass now measuring 7 cm and containing air-fluid levels. Findings may reflect necrotic component versus postsurgical change.  Hepatic lesion and ultrasound suggested  Moderate ascites  .Severe right hydronephrosis and dilatation of the right ureter suggesting obstruction secondary to encasement of the right ureter by the pelvic mass.  Multiple small bowel loops containing air-fluid levels without distention or obvious point of obstruction.  Diffuse osseous sclerotic lesions throughout the bony pelvis and thoracolumbar spine suggesting metastatic bone disease. Chief Complaint: RLQ pain    HPI: 73yo P0 with PMH significant for PE on eliquis, COPD not on home O2,  stage II cervical cancer approx 20-25years ago s/p chemoradiation, h/o stage IIB lobular breast cancer s/p left lumpectomy in 2015, h/o likely endometrial malignancy diagnosed in 2018, now admitted with 7cm pelvic mass and hydronephrosis.  Patient presenting to hospital from nursing home on 7/11 for intermittent RLQ pain which is now resolved. 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  Also with multiple loss stools per day, C.diff negative during this hospital admission.  Reports significant weight loss in the last year but unable to quanitfy amount.   Denies HA, CP, SOB, N/V, constipation, dysuria, hematuria, vaginal bleeding, vaginal discharge, abdominal pain. Denies night sweats, malaise, fevers, chills.      History of current pelvic mass is as follows:  PET-CT in 2018 was concerning for malignancy. CT-guided biopsy in 2018 showed adenocarcinoma for [place] 8, vimentin, CK7 and negative for CA and CK20 indicating mullerian tract origin, possible endometrial origin per pathology. Bony lesions were also biopsied and not consistent with malignancy.  She received carboplatin and paclitaxel as neoadjuvant chemo in 2018.  In Jan 2019, patient was admitted for symptomatic hypocalcemia and hypomagnesiemia.  CT at that time suggested progressive osteoblastic metas and omental metastasis since prior CT in 2018.  Due to abnormal chemistry findings after chemotherapy, patient declined at that time to continue chemotherapy.  She was started on megestrol and tamoxifen in February 2019.  Admitted to [facility] with high suspicion for PE in 2019, however patient declined imaging and was discharged from the hospital without anticoagulation.  At that time, patient stopped tamoxifen and restarted chemo with carboplatin and paclitaxel.  In July 2019, patient presented to [facility] with N/V and fever, CT at that time showed pelvic mass measuring 12cm.  In July 2019, she was diagnosed with DVT and intermediate probability of PE on V/Q scan, she was started on Eliquis.  She was switched back to megestrol and tamoxifen in 8/2019.  Most recently received palliative radiation to the pelvic mass due to occasional vaginal spotting.      Ob/Gyn History:  G0                 LMP 31 years old  Denies history of ovarian cysts, uterine fibroids, abnormal paps, or STIs  Last Pap Smear unknown   Last Mammogram 2018, fat necrosis in left breast, no evidence of malignancy  Last colonoscopy never    Denies the following: constitutional symptoms, visual symptoms, cardiovascular symptoms, respiratory symptoms, GI symptoms, musculoskeletal symptoms, skin symptoms, neurologic symptoms, hematologic symptoms, allergic symptoms, psychiatric symptoms  Except any pertinent positives listed.     Home Medications:  Advair Diskus 100 mcg-50 mcg inhalation powder: 1 puff(s) inhaled 2 times a day (12 Jul 2020 01:43)  albuterol 90 mcg/inh inhalation aerosol: 2 puff(s) inhaled 4 times a day, As Needed (12 Jul 2020 01:43)  alendronate weekly: 70 milligram(s) orally once a week (wednesday) (12 Jul 2020 01:43)  aspirin 81 mg oral tablet: 1 tab(s) orally once a day (12 Jul 2020 01:43)  Eliquis 5 mg oral tablet: 1 tab(s) orally 2 times a day (12 Jul 2020 01:43)  enalapril 10 mg oral tablet: 1 tab(s) orally once a day (12 Jul 2020 01:43)  latanoprost 0.005% ophthalmic solution: 1 drop(s) to each affected eye once a day (in the evening) (12 Jul 2020 01:43)  megestrol 20 mg oral tablet: 1 tab(s) orally 4 times a day (12 Jul 2020 01:43)  metoprolol succinate 25 mg oral tablet, extended release: 1 tab(s) orally once a day (12 Jul 2020 01:43)  montelukast 10 mg oral tablet: 1 tab(s) orally once a day (12 Jul 2020 01:43)  simvastatin 20 mg oral tablet: 1 tab(s) orally once a day (at bedtime) (12 Jul 2020 01:43)  tamoxifen 20 mg oral tablet: 1 tab(s) orally once a day (12 Jul 2020 01:43)    Allergies    iodinated radiocontrast agents (Other)  penicillin (Unknown)  Intolerances    PAST MEDICAL HISTORY:  Osteoporosis  Obesity: bmi 41  COPD (chronic obstructive pulmonary disease)  Asthma: last attack 1/2018- hospitalized & steroids  Diabetes  Hypertension  Breast cancer: left  Cervical cancer: radiation & chemo      PAST SURGICAL HISTORY:  History of appendectomy  History of lumpectomy of left breast  T&A    FAMILY HISTORY:  Family history of breast cancer (Mother, Sister)    SOCIAL HISTORY: Denies current cigarette use, alcohol use, or illicit drug use.  H/o remote cigarette use 30 years ago.    Vital Signs Last 24 Hrs  T(F): 98 (17 Jul 2020 15:57), Max: 98 (17 Jul 2020 08:00)  HR: 63 (17 Jul 2020 15:57) (63 - 122)  BP: 105/54 (17 Jul 2020 15:57) (91/54 - 149/85)  RR: 18 (17 Jul 2020 15:57) (18 - 18)    General Appearance - AAOx3, NAD  Heart - S1S2 regular rate and rhythm  Lung - CTA Bilaterally  Abdomen - Soft, nontender, nondistended, no rebound, no rigidity, no guarding, bowel sounds present    GYN/Pelvis: Patient declined at this time.    Meds:   apixaban 5 milliGRAM(s) Oral two times a day  bisacodyl 20 milliGRAM(s) Oral once  cefepime   IVPB 2000 milliGRAM(s) IV Intermittent once  iohexol 300 mG (iodine)/mL Oral Solution 30 milliLiter(s) Oral once  lactated ringers Bolus 500 milliLiter(s) IV Bolus once  lactated ringers Bolus 250 milliLiter(s) IV Bolus once  lactated ringers Bolus 250 milliLiter(s) IV Bolus once  magnesium sulfate  IVPB 2 Gram(s) IV Intermittent every 2 hours  methylPREDNISolone sodium succinate Injectable 125 milliGRAM(s) IV Push Once  ondansetron Injectable 4 milliGRAM(s) IV Push once  sodium chloride 0.9% Bolus 500 milliLiter(s) IV Bolus once  sodium chloride 0.9% Bolus 1000 milliLiter(s) IV Bolus once      LABS:                        8.4    11.09 )-----------( 257      ( 17 Jul 2020 08:32 )             27.8         07-17    136  |  104  |  15  ----------------------------<  76  4.0   |  19  |  1.2    Ca    7.8<L>      17 Jul 2020 08:32  Mg     1.9     07-17    Culture - Urine (collected 07-15-20 @ 12:20)  Source: .Urine Catheterized  Final Report (07-17-20 @ 15:18):    >100,000 CFU/ml Proteus mirabilis    Susceptibility to follow.    Culture - Urine (collected 07-11-20 @ 21:20)  Source: .Urine Clean Catch (Midstream)  Final Report (07-13-20 @ 09:28):    >=3 organisms. Probable collection contamination.        RADIOLOGY & ADDITIONAL STUDIES:    EXAM:  CT ABDOMEN AND PELVIS            PROCEDURE DATE:  07/11/2020    INTERPRETATION:  CLINICAL STATEMENT:  TECHNIQUE: Contiguous axial CT images were obtained from the lower chest to the pubic symphysis without intravenous contrast.  Oral contrast was not administered.  Reformatted images in the coronal and sagittal planes were acquired.  COMPARISON CT: CT abdomen and pelvis 1/22/2018  OTHER STUDIES USED FOR CORRELATION: None.   FINDINGS:  LOWER CHEST: Trace right and small left-sided pleural effusions. Coronary artery calcifications.  HEPATOBILIARY: 1.8 cm low density lesion lateral segment left hepatic lobe which appears new and further evaluation with ultrasound and/or contrast CT suggested  SPLEEN: Unremarkable.  PANCREAS: Unremarkable.  ADRENAL GLANDS: Unremarkable.  KIDNEYS: Severe right hydronephrosis and dilatation of the right ureter suggesting obstruction secondary to encasement of the right ureter by the pelvic mass.  ABDOMINOPELVIC NODES: Decreased left para-aortic predominantly fat 3 x 1.5 cm retroperitoneal structure, likely fatty lymph node.  PELVIC ORGANS: Intra-abdominal pelvic ascites obscures underlying pelvic organs. Decreased, previously 11.7 cm, pelvic mass now measuring 7 x3.2 x 6 cm and containing air-fluid levels.  PERITONEUM/MESENTERY/BOWEL: Multiple small bowel loops containing air-fluid levels without distention or obvious point of obstruction.   BONES/SOFT TISSUES: Diffuse osseous sclerotic lesions throughoutthe bony pelvis and thoracolumbar spine..  OTHER:  IMPRESSION:  Since January 22, 2018;  Previously 11.7 cm pelvic mass now measuring 7 cm and containing air-fluid levels. Findings may reflect necrotic component versus postsurgical change.  Hepatic lesion and ultrasound suggested  Moderate ascites  .Severe right hydronephrosis and dilatation of the right ureter suggesting obstruction secondary to encasement of the right ureter by the pelvic mass.  Multiple small bowel loops containing air-fluid levels without distention or obvious point of obstruction.  Diffuse osseous sclerotic lesions throughout the bony pelvis and thoracolumbar spine suggesting metastatic bone disease.      EXAM:  US ABDOMEN LIMITED        PROCEDURE DATE:  07/12/2020    INTERPRETATION:  CLINICAL HISTORY: History of uterine cancer. Hepatic lesion seen on CT from 7/11/2020..  COMPARISON: Abdominal ultrasound dated 1/23/2018 and correlationmade to abdominal CT dated 7/11/2020..  PROCEDURE: Ultrasound of the right upper quadrant was performed.  FINDINGS:  LIVER:  Normal in contour. Increased liver parenchymal echogenicity. Liver measures 14.9 cm in length.  1.2 x 1.9 cm hypoechoiclesion in the left lobe of the liver.  GALLBLADDER/BILIARY TREE:  Gallbladder sludge, no calculi.Gallbladder wall thickness measures 3 mm, which is on the upper limit of normal. No wall thickening or pericholecystic fluid. Negative sonographic Keita's sign. No intrahepatic biliary ductal dilatation. The common bile duct measures 4 mm, which is normal.  PANCREAS:  obscured by overlying bowel gas.  KIDNEY:  Right kidney measures 9.7 cm in length. Moderate hydronephrosis. There is a 3.9 cm calyceal diverticulum. No evidence of renal calculi.  AORTA/IVC:  Visualized proximal portions unremarkable.  ASCITES:  Mild to moderate perihepatic ascites.  IMPRESSION:  1.  1.2 x 1.9 cm hypoechoic lesion in the left lobe of the liver. This issuspicious for a metastatic focus. Recommend MRI of the abdomen with IV contrast for further characterization.  2.  Moderate hydronephrosis of the right kidney, which has increased in size when compared to previous ultrasound dated 1/23/2018.  3. Right renal calyceal diverticulum, stable.  4.  Mild to moderate perihepatic ascites. Chief Complaint: RLQ pain    HPI: 73yo P0 with PMH significant for PE on eliquis, COPD not on home O2,  stage II cervical cancer approx 20-25years ago s/p chemoradiation, h/o stage IIB lobular breast cancer s/p left lumpectomy in 2015, h/o likely endometrial malignancy diagnosed in 2018, now admitted with 7cm pelvic mass and hydronephrosis.  Patient presenting to hospital from nursing home on 7/11 for intermittent RLQ pain which is now resolved. 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  Also with multiple loss stools per day, C.diff negative during this hospital admission.  Reports significant weight loss in the last year but unable to quanitfy amount.   Denies HA, CP, SOB, N/V, constipation, dysuria, hematuria, vaginal bleeding, vaginal discharge, abdominal pain. Denies night sweats, malaise, fevers, chills.      History of current pelvic mass is as follows:  PET-CT in 2018 was concerning for malignancy. CT-guided biopsy in 2018 showed adenocarcinoma for [place] 8, vimentin, CK7 and negative for CA and CK20 indicating mullerian tract origin, possible endometrial origin per pathology. Bony lesions were also biopsied and not consistent with malignancy.  She received carboplatin and paclitaxel as neoadjuvant chemo in 2018.  In Jan 2019, patient was admitted for symptomatic hypocalcemia and hypomagnesiemia.  CT at that time suggested progressive osteoblastic and omental metastases since prior CT in 2018.  Due to abnormal chemistry findings after chemotherapy, patient declined at that time to continue chemotherapy.  She was started on megestrol and tamoxifen in February 2019.  Admitted to [facility] with high suspicion for PE in 2019, however patient declined imaging and was discharged from the hospital without anticoagulation.  At that time, patient stopped tamoxifen and restarted chemo with carboplatin and paclitaxel.  In July 2019, patient presented to [facility] with N/V and fever, CT at that time showed pelvic mass measuring 12cm.  In July 2019, she was diagnosed with DVT and intermediate probability of PE on V/Q scan, she was started on Eliquis.  She was switched back to megestrol and tamoxifen in 8/2019.  Most recently received palliative radiation to the pelvic mass due to occasional vaginal spotting.      Ob/Gyn History:  G0                 LMP 31 years old  Denies history of ovarian cysts, uterine fibroids, abnormal paps, or STIs  Last Pap Smear unknown   Last Mammogram 2018, fat necrosis in left breast, no evidence of malignancy  Last colonoscopy never    Denies the following: constitutional symptoms, visual symptoms, cardiovascular symptoms, respiratory symptoms, GI symptoms, musculoskeletal symptoms, skin symptoms, neurologic symptoms, hematologic symptoms, allergic symptoms, psychiatric symptoms  Except any pertinent positives listed.     Home Medications:  Advair Diskus 100 mcg-50 mcg inhalation powder: 1 puff(s) inhaled 2 times a day (12 Jul 2020 01:43)  albuterol 90 mcg/inh inhalation aerosol: 2 puff(s) inhaled 4 times a day, As Needed (12 Jul 2020 01:43)  alendronate weekly: 70 milligram(s) orally once a week (wednesday) (12 Jul 2020 01:43)  aspirin 81 mg oral tablet: 1 tab(s) orally once a day (12 Jul 2020 01:43)  Eliquis 5 mg oral tablet: 1 tab(s) orally 2 times a day (12 Jul 2020 01:43)  enalapril 10 mg oral tablet: 1 tab(s) orally once a day (12 Jul 2020 01:43)  latanoprost 0.005% ophthalmic solution: 1 drop(s) to each affected eye once a day (in the evening) (12 Jul 2020 01:43)  megestrol 20 mg oral tablet: 1 tab(s) orally 4 times a day (12 Jul 2020 01:43)  metoprolol succinate 25 mg oral tablet, extended release: 1 tab(s) orally once a day (12 Jul 2020 01:43)  montelukast 10 mg oral tablet: 1 tab(s) orally once a day (12 Jul 2020 01:43)  simvastatin 20 mg oral tablet: 1 tab(s) orally once a day (at bedtime) (12 Jul 2020 01:43)  tamoxifen 20 mg oral tablet: 1 tab(s) orally once a day (12 Jul 2020 01:43)    Allergies    iodinated radiocontrast agents (Other)  penicillin (Unknown)  Intolerances    PAST MEDICAL HISTORY:  Osteoporosis  Obesity: bmi 41  COPD (chronic obstructive pulmonary disease)  Asthma: last attack 1/2018- hospitalized & steroids  Diabetes  Hypertension  Breast cancer: left  Cervical cancer: radiation & chemo      PAST SURGICAL HISTORY:  History of appendectomy  History of lumpectomy of left breast  T&A    FAMILY HISTORY:  Family history of breast cancer (Mother, Sister)    SOCIAL HISTORY: Denies current cigarette use, alcohol use, or illicit drug use.  H/o remote cigarette use 30 years ago.    Vital Signs Last 24 Hrs  T(F): 98 (17 Jul 2020 15:57), Max: 98 (17 Jul 2020 08:00)  HR: 63 (17 Jul 2020 15:57) (63 - 122)  BP: 105/54 (17 Jul 2020 15:57) (91/54 - 149/85)  RR: 18 (17 Jul 2020 15:57) (18 - 18)    General Appearance - AAOx3, NAD  Heart - S1S2 regular rate and rhythm  Lung - CTA Bilaterally  Abdomen - Soft, nontender, nondistended, no rebound, no rigidity, no guarding, bowel sounds present    GYN/Pelvis: Patient declined at this time.    Meds:   apixaban 5 milliGRAM(s) Oral two times a day  bisacodyl 20 milliGRAM(s) Oral once  cefepime   IVPB 2000 milliGRAM(s) IV Intermittent once  iohexol 300 mG (iodine)/mL Oral Solution 30 milliLiter(s) Oral once  lactated ringers Bolus 500 milliLiter(s) IV Bolus once  lactated ringers Bolus 250 milliLiter(s) IV Bolus once  lactated ringers Bolus 250 milliLiter(s) IV Bolus once  magnesium sulfate  IVPB 2 Gram(s) IV Intermittent every 2 hours  methylPREDNISolone sodium succinate Injectable 125 milliGRAM(s) IV Push Once  ondansetron Injectable 4 milliGRAM(s) IV Push once  sodium chloride 0.9% Bolus 500 milliLiter(s) IV Bolus once  sodium chloride 0.9% Bolus 1000 milliLiter(s) IV Bolus once      LABS:                        8.4    11.09 )-----------( 257      ( 17 Jul 2020 08:32 )             27.8         07-17    136  |  104  |  15  ----------------------------<  76  4.0   |  19  |  1.2    Ca    7.8<L>      17 Jul 2020 08:32  Mg     1.9     07-17    Culture - Urine (collected 07-15-20 @ 12:20)  Source: .Urine Catheterized  Final Report (07-17-20 @ 15:18):    >100,000 CFU/ml Proteus mirabilis    Susceptibility to follow.    Culture - Urine (collected 07-11-20 @ 21:20)  Source: .Urine Clean Catch (Midstream)  Final Report (07-13-20 @ 09:28):    >=3 organisms. Probable collection contamination.        RADIOLOGY & ADDITIONAL STUDIES:    EXAM:  CT ABDOMEN AND PELVIS            PROCEDURE DATE:  07/11/2020    INTERPRETATION:  CLINICAL STATEMENT:  TECHNIQUE: Contiguous axial CT images were obtained from the lower chest to the pubic symphysis without intravenous contrast.  Oral contrast was not administered.  Reformatted images in the coronal and sagittal planes were acquired.  COMPARISON CT: CT abdomen and pelvis 1/22/2018  OTHER STUDIES USED FOR CORRELATION: None.   FINDINGS:  LOWER CHEST: Trace right and small left-sided pleural effusions. Coronary artery calcifications.  HEPATOBILIARY: 1.8 cm low density lesion lateral segment left hepatic lobe which appears new and further evaluation with ultrasound and/or contrast CT suggested  SPLEEN: Unremarkable.  PANCREAS: Unremarkable.  ADRENAL GLANDS: Unremarkable.  KIDNEYS: Severe right hydronephrosis and dilatation of the right ureter suggesting obstruction secondary to encasement of the right ureter by the pelvic mass.  ABDOMINOPELVIC NODES: Decreased left para-aortic predominantly fat 3 x 1.5 cm retroperitoneal structure, likely fatty lymph node.  PELVIC ORGANS: Intra-abdominal pelvic ascites obscures underlying pelvic organs. Decreased, previously 11.7 cm, pelvic mass now measuring 7 x3.2 x 6 cm and containing air-fluid levels.  PERITONEUM/MESENTERY/BOWEL: Multiple small bowel loops containing air-fluid levels without distention or obvious point of obstruction.   BONES/SOFT TISSUES: Diffuse osseous sclerotic lesions throughoutthe bony pelvis and thoracolumbar spine..  OTHER:  IMPRESSION:  Since January 22, 2018;  Previously 11.7 cm pelvic mass now measuring 7 cm and containing air-fluid levels. Findings may reflect necrotic component versus postsurgical change.  Hepatic lesion and ultrasound suggested  Moderate ascites  .Severe right hydronephrosis and dilatation of the right ureter suggesting obstruction secondary to encasement of the right ureter by the pelvic mass.  Multiple small bowel loops containing air-fluid levels without distention or obvious point of obstruction.  Diffuse osseous sclerotic lesions throughout the bony pelvis and thoracolumbar spine suggesting metastatic bone disease.      EXAM:  US ABDOMEN LIMITED        PROCEDURE DATE:  07/12/2020    INTERPRETATION:  CLINICAL HISTORY: History of uterine cancer. Hepatic lesion seen on CT from 7/11/2020..  COMPARISON: Abdominal ultrasound dated 1/23/2018 and correlationmade to abdominal CT dated 7/11/2020..  PROCEDURE: Ultrasound of the right upper quadrant was performed.  FINDINGS:  LIVER:  Normal in contour. Increased liver parenchymal echogenicity. Liver measures 14.9 cm in length.  1.2 x 1.9 cm hypoechoiclesion in the left lobe of the liver.  GALLBLADDER/BILIARY TREE:  Gallbladder sludge, no calculi.Gallbladder wall thickness measures 3 mm, which is on the upper limit of normal. No wall thickening or pericholecystic fluid. Negative sonographic Keita's sign. No intrahepatic biliary ductal dilatation. The common bile duct measures 4 mm, which is normal.  PANCREAS:  obscured by overlying bowel gas.  KIDNEY:  Right kidney measures 9.7 cm in length. Moderate hydronephrosis. There is a 3.9 cm calyceal diverticulum. No evidence of renal calculi.  AORTA/IVC:  Visualized proximal portions unremarkable.  ASCITES:  Mild to moderate perihepatic ascites.  IMPRESSION:  1.  1.2 x 1.9 cm hypoechoic lesion in the left lobe of the liver. This issuspicious for a metastatic focus. Recommend MRI of the abdomen with IV contrast for further characterization.  2.  Moderate hydronephrosis of the right kidney, which has increased in size when compared to previous ultrasound dated 1/23/2018.  3. Right renal calyceal diverticulum, stable.  4.  Mild to moderate perihepatic ascites.

## 2020-07-19 NOTE — PROGRESS NOTE ADULT - ASSESSMENT
75 yo F with PMH of cervical cancer s/p radiation therapy (20 years ago), breast cancer (ER+ / CA+) s/p lumpectomy + radiation thereby (6 years ago), uterine cancer s/p chemotherapy (3 years ago) as per patient, 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     #RLQ/suprapubic pain 2/2 obstructive pelvic mass  #Right sided hydronephrosis 2/2 pelvic mass with necrosis   #Colovesical fistula   -necrotic pelvic mass seen on imaging from [facility] with possible evidence of fistulization to the cecum (records in chart)   -seen by IR no perc nephrostomy recommended   -catherine in place draining feculent fluid   -uro and gen surg eval noted, plan for diversion ileostomy   - Colonoscopy on Monday, clear liquids on Sunday, GoLytely at 4pm sunday, dulcolax 20 mg once on Sunday.   - Pt shifted to Lovenox, to receive last dose on Sunday 6pm.    #leukocytosis, possible pyelonephritis  possibly 2/2 necrotic pelvic mass vs pyelo  On cefepime 2g q12h    #chronic anemia likely 2/2 iron deficiency   cont iron supplementation  trend cbc transfuse if hgb <7  maintain active type and screen     #hx of right LE DVT    - holding Eliquis, bridging with Lovenox     Poor overall prognosis     Code: Full, Palliative following

## 2020-07-19 NOTE — PROGRESS NOTE ADULT - ASSESSMENT
Assessment:  74y Female patient admitted for colovesicular fistula. Patient being seen by nephrology for R ureter obstruction, with nothing to do on their end. Patient is poor surgical candidate, but will follow for need of diverting ostomy.        Plan:  - palliative diverting loop ileostomy planning  - IV abx  - f/u ID  - GI consult for colonoscopy monday  - GYN Onc/ Med Onc evaluation  - colorectal surgery to follow .  -npo after midnight

## 2020-07-19 NOTE — PROGRESS NOTE ADULT - SUBJECTIVE AND OBJECTIVE BOX
Pt seen and examined at bedside.    VITAL SIGNS (Last 24 hrs):  T(C): 37.2 (07-19-20 @ 15:30), Max: 37.2 (07-19-20 @ 15:30)  HR: 122 (07-19-20 @ 15:30) (101 - 122)  BP: 97/52 (07-19-20 @ 15:30) (93/54 - 97/52)  RR: 19 (07-19-20 @ 15:30) (18 - 19)  SpO2: --  Wt(kg): --  Daily     Daily     I&O's Summary    18 Jul 2020 07:01  -  19 Jul 2020 07:00  --------------------------------------------------------  IN: 1080 mL / OUT: 25 mL / NET: 1055 mL    19 Jul 2020 07:01  -  19 Jul 2020 17:04  --------------------------------------------------------  IN: 900 mL / OUT: 0 mL / NET: 900 mL        PHYSICAL EXAM:  GENERAL: NAD   HEAD:  Atraumatic, Normocephalic  EYES: conjunctiva and sclera clear  NECK: Supple, No JVD  CHEST/LUNG: Clear to auscultation bilaterally; No wheeze  HEART: Regular rate and rhythm; No murmurs, rubs, or gallops  ABDOMEN: Soft, Nontender, Nondistended; Bowel sounds present  EXTREMITIES:  2+ Peripheral Pulses, No clubbing, cyanosis, or edema  PSYCH: AAOx3  NEUROLOGY: non-focal  SKIN: No rashes or lesions    Labs Reviewed  Spoke to patient in regards to abnormal labs.    CBC Full  -  ( 17 Jul 2020 08:32 )  WBC Count : 11.09 K/uL  Hemoglobin : 8.4 g/dL  Hematocrit : 27.8 %  Platelet Count - Automated : 257 K/uL  Mean Cell Volume : 80.6 fL  Mean Cell Hemoglobin : 24.3 pg  Mean Cell Hemoglobin Concentration : 30.2 g/dL  Auto Neutrophil # : 9.93 K/uL  Auto Lymphocyte # : 0.61 K/uL  Auto Monocyte # : 0.33 K/uL  Auto Eosinophil # : 0.07 K/uL  Auto Basophil # : 0.10 K/uL  Auto Neutrophil % : 89.5 %  Auto Lymphocyte % : 5.5 %  Auto Monocyte % : 3.0 %  Auto Eosinophil % : 0.6 %  Auto Basophil % : 0.9 %    BMP:    07-17 @ 08:32    Blood Urea Nitrogen - 15  Calcium - 7.8  Carbon Dioxide - 19  Chloride - 104  Creatinine - 1.2  Glucose - 76  Potassium - 4.0  Sodium - 136       Urine Culture:  07-17 @ 06:20 Urine culture: --    Culture Results:   >100,000 CFU/ml Gram positive organisms  10,000 - 49,000 CFU/mL Gram Negative Rods  Method Type: --  Organism: --  Organism Identification: --  Specimen Source: .Urine Catheterized  07-15 @ 12:20 Urine culture: --    Culture Results:   >100,000 CFU/ml Proteus mirabilis  50,000 - 99,000 CFU/mL Morganella morganii  10,000 - 49,000 CFU/mL Escherichia coli  Method Type: SABIHA  Organism: Proteus mirabilis  Organism Identification: Proteus mirabilis  Morganella morganii  Escherichia coli  Specimen Source: .Urine Catheterized       MEDICATIONS  (STANDING):  bisacodyl 20 milliGRAM(s) Oral once  cefepime   IVPB 2000 milliGRAM(s) IV Intermittent every 12 hours  cefepime   IVPB      chlorhexidine 4% Liquid 1 Application(s) Topical <User Schedule>  enoxaparin Injectable 70 milliGRAM(s) SubCutaneous once  lactated ringers. 1000 milliLiter(s) (100 mL/Hr) IV Continuous <Continuous>  latanoprost 0.005% Ophthalmic Solution 1 Drop(s) Both EYES at bedtime  montelukast 10 milliGRAM(s) Oral daily  saccharomyces boulardii 250 milliGRAM(s) Oral two times a day  simvastatin 20 milliGRAM(s) Oral at bedtime  tamoxifen 20 milliGRAM(s) Oral daily    MEDICATIONS  (PRN):  ALBUTerol    90 MICROgram(s) HFA Inhaler 2 Puff(s) Inhalation every 6 hours PRN Bronchospasm  diphenhydrAMINE 25 milliGRAM(s) Oral every 4 hours PRN Rash and/or Itching

## 2020-07-19 NOTE — PROGRESS NOTE ADULT - SUBJECTIVE AND OBJECTIVE BOX
Progress Note: General Surgery  Patient: RILEY MARIE , 74y (1945)Female   MRN: [id number]  Location: [address]  Visit: 07-11-20 Inpatient  Date: 07-19-20 @ 08:30    Procedure/Diagnosis: Colovesical fistula    Events/ 24h: No acute events overnight. Pain controlled.    Vitals: T(F): 96.5 (07-19-20 @ 07:43), Max: 98.6 (07-19-20 @ 00:00)  HR: 101 (07-19-20 @ 07:43)  BP: 96/53 (07-19-20 @ 07:43) (93/54 - 99/56)  RR: 18 (07-19-20 @ 07:43)  SpO2: --    In:   07-18-20 @ 07:01  -  07-19-20 @ 07:00  --------------------------------------------------------  IN: 1080 mL      Out:   07-18-20 @ 07:01  -  07-19-20 @ 07:00  --------------------------------------------------------  OUT:    Indwelling Catheter - Urethral: 25 mL  Total OUT: 25 mL        Net:   07-18-20 @ 07:01  -  07-19-20 @ 07:00  --------------------------------------------------------  NET: 1055 mL        Diet: Diet, NPO:   NPO for Procedure/Test     NPO Start Date: 19-Jul-2020,   NPO Start Time: 11:59 (07-17-20 @ 07:56)  Diet, Clear Liquid (07-18-20 @ 18:20)    IV Fluids: lactated ringers. 1000 milliLiter(s) (100 mL/Hr) IV Continuous <Continuous>      Physical Examination:  General Appearance: NAD  HEENT: EOMI, sclera non-icteric.  Heart: RRR   Lungs: CTABL.   Abdomen:  Soft, mildly tender, mildy distended.   MSK/Extremities: Warm & well-perfused.   Skin: Warm, dry. No jaundice.       Medications: [Standing]  bisacodyl 20 milliGRAM(s) Oral once  cefepime   IVPB 2000 milliGRAM(s) IV Intermittent every 12 hours  cefepime   IVPB      chlorhexidine 4% Liquid 1 Application(s) Topical <User Schedule>  enoxaparin Injectable 70 milliGRAM(s) SubCutaneous every 12 hours  lactated ringers. 1000 milliLiter(s) (100 mL/Hr) IV Continuous <Continuous>  latanoprost 0.005% Ophthalmic Solution 1 Drop(s) Both EYES at bedtime  montelukast 10 milliGRAM(s) Oral daily  polyethylene glycol/electrolyte Solution. 4000 milliLiter(s) Oral once  saccharomyces boulardii 250 milliGRAM(s) Oral two times a day  simvastatin 20 milliGRAM(s) Oral at bedtime  tamoxifen 20 milliGRAM(s) Oral daily    DVT Prophylaxis: enoxaparin Injectable 70 milliGRAM(s) SubCutaneous every 12 hours    GI Prophylaxis:   Antibiotics: cefepime   IVPB 2000 milliGRAM(s) IV Intermittent every 12 hours  cefepime   IVPB        Anticoagulation:   Medications:[PRN]  ALBUTerol    90 MICROgram(s) HFA Inhaler 2 Puff(s) Inhalation every 6 hours PRN  diphenhydrAMINE 25 milliGRAM(s) Oral every 4 hours PRN      Labs:                        8.4    11.09 )-----------( 257      ( 17 Jul 2020 08:32 )             27.8     07-17    136  |  104  |  15  ----------------------------<  76  4.0   |  19  |  1.2    Ca    7.8<L>      17 Jul 2020 08:32  Mg     1.9     07-17            Imaging:     Assessment:  74y Female patient admitted S/P ***     Plan:    DVT/GI ppx  OOBAT  IS  Pain control    Date/Time: 07-19-20 @ 08:30

## 2020-07-20 NOTE — PROGRESS NOTE ADULT - ASSESSMENT
Consult Summary  74 year old woman with history of uterine cancer 20 years ago s/p radiation, history of breast cancer s/p lumpectomy + radiation,  COPD presented with diarrhea and abdominal pain. Hospital course complicated by evidence of hydronephresis in the setting of likely obstruction, evidence of an obstructing necrotic pelvic mass, and evidence of a colovesical fistula.  Palliative care consulted for GOC.    Patient seen at bedside. Denied pain, SOB, but did have some nausea. Per patient, could tolerate prep for colonoscopy today, but will try again for tomorrow. She stated that she would prefer to not have surgery, but if needed to improve clinically enough to received treatment for her cancer, she'd want it. All questions answered    Morphine Equivalent Daily Dose (MEDD):  NA    Recommendations:  - Full code, ongoing medical management per primary team  - Colonoscopy planned for 7/11; Surgical intervention to be scheduled by surgical team pending colonoscopy results  - patient denies pain  - recommend starting zofran 4mg IV q4h PRN for nausea  - HCP filled out with patient naming her niece, Temitope Green, her HCP;  - Palliative care will continue to follow and be available for GOC discussions as appropriate.    Please Call [phone] PRN

## 2020-07-20 NOTE — PROGRESS NOTE ADULT - SUBJECTIVE AND OBJECTIVE BOX
74 year old woman with history of uterine cancer 20 years ago s/p radiation, history of breast cancer s/p lumpectomy + radiation,  COPD presented with diarrhea and abdominal pain. Hospital course complicated by evidence of hydronephresis in the setting of likely obstruction, evidence of an obstructing necrotic pelvic mass, and evidence of a colovesical fistula.  Palliative care consulted for GOC.    Interval History  -Patient seen at bedside  -Denied pain, SOB, but did have some nausea  -Per patient, could tolerate prep for colonoscopy today, but will try again for tomorrow  -She stated that she would prefer to not have surgery, but if needed to improve clinically enough to received treatment for her cancer, she'd want it  -All questions answered      PHYSICAL EXAM    T(C): , Max: 37.1 (15:40)  T(F): 98.8  HR: 121 (111 - 121)  BP: 90/53 (90/53 - 98/62)  RR: 18 (18 - 19)  SpO2: --              LABS:                                                                                                                                               MEDICATIONS  (STANDING):  bisacodyl 20 milliGRAM(s) Oral at bedtime  cefepime   IVPB 2000 milliGRAM(s) IV Intermittent every 12 hours  cefepime   IVPB      chlorhexidine 4% Liquid 1 Application(s) Topical <User Schedule>  enoxaparin Injectable 70 milliGRAM(s) SubCutaneous two times a day  lactated ringers. 1000 milliLiter(s) (100 mL/Hr) IV Continuous <Continuous>  latanoprost 0.005% Ophthalmic Solution 1 Drop(s) Both EYES at bedtime  magnesium citrate Oral Solution 2 Bottle Oral once  montelukast 10 milliGRAM(s) Oral daily  saccharomyces boulardii 250 milliGRAM(s) Oral two times a day  simvastatin 20 milliGRAM(s) Oral at bedtime  tamoxifen 20 milliGRAM(s) Oral daily    MEDICATIONS  (PRN):  ALBUTerol    90 MICROgram(s) HFA Inhaler 2 Puff(s) Inhalation every 6 hours PRN Bronchospasm  diphenhydrAMINE 25 milliGRAM(s) Oral every 4 hours PRN Rash and/or Itching  ondansetron    Tablet 4 milliGRAM(s) Oral every 6 hours PRN Nausea and/or Vomiting

## 2020-07-20 NOTE — PROGRESS NOTE ADULT - ASSESSMENT
73 yo F with PMH of cervical cancer s/p radiation therapy (20 years ago), breast cancer (ER+ / [place]+) s/p lumpectomy + radiation thereby (6 years ago), uterine cancer s/p chemotherapy (3 years ago) as per patient, 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     #RLQ/suprapubic pain 2/2 obstructive pelvic mass  #Right sided hydronephrosis 2/2 pelvic mass with necrosis   #Colovesical fistula   Necrotic pelvic mass seen on imaging from [facility] with possible evidence of fistulization to the cecum (records in chart)   Seen by IR no perc nephrostomy recommended   -catherine in place but patient is still urinating on herself   -uro and gen surg eval noted, plan for diversion ileostomy   -Colonoscopy on Tuesday as patient could not tolerate go lightly; on clear liquids now  -Will follow up with GI for regimen and last lovenox dose    #leukocytosis, possible pyelonephritis  possibly 2/2 necrotic pelvic mass vs pyelo  On cefepime 2g q12h    #chronic anemia likely 2/2 iron deficiency   cont iron supplementation  trend cbc transfuse if hgb <7  maintain active type and screen     #hx of right LE DVT    - holding Eliquis, bridging with Lovenox       Code: Full, Palliative following 75 yo F with PMH of cervical cancer s/p radiation therapy (20 years ago), breast cancer (ER+ / [place]+) s/p lumpectomy + radiation thereby (6 years ago), uterine cancer s/p chemotherapy (3 years ago) as per patient, 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     #RLQ/suprapubic pain 2/2 obstructive pelvic mass  #Right sided hydronephrosis 2/2 pelvic mass with necrosis   #Colovesical fistula   Necrotic pelvic mass seen on imaging from [facility] with possible evidence of fistulization to the cecum (records in chart)   Seen by IR no perc nephrostomy recommended   -catherine in place but patient is still urinating on herself   -uro and gen surg eval noted, plan for possible diversion ileostomy. Pt told surgeon this morning she does not want surgery   -Colonoscopy on Tuesday as patient could not tolerate go lightly; on clear liquids now  -Will follow up with GI for regimen and last lovenox dose  -F/u with palliative    #leukocytosis, possible pyelonephritis  possibly 2/2 necrotic pelvic mass vs pyelo  On cefepime 2g q12h    #chronic anemia likely 2/2 iron deficiency   cont iron supplementation  trend cbc transfuse if hgb <7  maintain active type and screen     #hx of right LE DVT    - holding Eliquis, bridging with Lovenox       Code: Full, Palliative following 75 yo F with PMH of cervical cancer s/p radiation therapy (20 years ago), breast cancer (ER+ / [place]+) s/p lumpectomy + radiation thereby (6 years ago), uterine cancer s/p chemotherapy (3 years ago) as per patient, 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     #RLQ/suprapubic pain 2/2 obstructive pelvic mass  #Right sided hydronephrosis 2/2 pelvic mass with necrosis   #Colovesical fistula   Necrotic pelvic mass seen on imaging from [facility] with possible evidence of fistulization to the cecum (records in chart)   Seen by IR no perc nephrostomy recommended   -catherine in place but patient is still urinating on herself   -uro and gen surg eval noted, plan for possible diversion ileostomy. Pt told surgeon this morning she does not want surgery. She told me she was unsure.   -Colonoscopy on Tuesday as patient could not tolerate go lightly; on clear liquids now  -Will follow up with GI for regimen and last lovenox dose  -F/u with palliative    #leukocytosis, possible pyelonephritis  possibly 2/2 necrotic pelvic mass vs pyelo  On cefepime 2g q12h    #chronic anemia likely 2/2 iron deficiency   cont iron supplementation  trend cbc transfuse if hgb <7  maintain active type and screen     #hx of right LE DVT    - holding Eliquis, bridging with Lovenox       Code: Full, Palliative following 73 yo F with PMH of cervical cancer s/p radiation therapy (20 years ago), breast cancer (ER+ / [place]+) s/p lumpectomy + radiation thereby (6 years ago), uterine cancer s/p chemotherapy (3 years ago) as per patient, 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     #RLQ/suprapubic pain 2/2 obstructive pelvic mass  #Right sided hydronephrosis 2/2 pelvic mass with necrosis   #Colovesical fistula   Necrotic pelvic mass seen on imaging from [facility] with possible evidence of fistulization to the cecum (records in chart)   Seen by IR no perc nephrostomy recommended   -catherine in place but patient is still urinating on herself   -uro and gen surg eval noted, plan for possible diversion ileostomy. Pt confirmed will palliative that she wants to do the surgery if needed  -Colonoscopy on Tuesday as patient could not tolerate go lightly; on clear liquids now  -Will follow up with GI for regimen    #leukocytosis, possible pyelonephritis  possibly 2/2 necrotic pelvic mass vs pyelo  On cefepime 2g q12h    #chronic anemia likely 2/2 iron deficiency   cont iron supplementation  trend cbc transfuse if hgb <7  maintain active type and screen     #hx of right LE DVT    - holding Eliquis, bridging with Lovenox     Code: Full, Palliative following

## 2020-07-20 NOTE — CHART NOTE - NSCHARTNOTEFT_GEN_A_CORE
OBGYN PGY4 Note:     Pt seen at bedside. Records from [facility] reviewed. Poor surgical candidate at this time.     - f/u with primary oncologists   - reconsult PRN     Discussed with Dr. Boone.

## 2020-07-20 NOTE — PROGRESS NOTE ADULT - SUBJECTIVE AND OBJECTIVE BOX
GENERAL SURGERY PROGRESS NOTE     CYNTHIA RILEY  [address] [place] [address] day :9d  Surgical Attending: Berlin Perkins    Overnight events:  Patient seen & examined at bedside. In NAD & has no complaints.  Pt NPO at midnight for scope with GI today  per cardio pt in moderate risk   ECHO showed EF of 55% with a grade I diastolic dysfunction.    T(F): 98.9 (07-19-20 @ 15:30), Max: 98.9 (07-19-20 @ 15:30)  HR: 122 (07-19-20 @ 15:30) (101 - 122)  BP: 97/52 (07-19-20 @ 15:30) (96/53 - 97/52)  ABP: --  ABP(mean): --  RR: 19 (07-19-20 @ 15:30) (18 - 19)  SpO2: --      07-18-20 @ 07:01  -  07-19-20 @ 07:00  --------------------------------------------------------  IN:    Oral Fluid: 1080 mL  Total IN: 1080 mL    OUT:    Indwelling Catheter - Urethral: 25 mL  Total OUT: 25 mL    Total NET: 1055 mL      07-19-20 @ 07:01  -  07-20-20 @ 01:39  --------------------------------------------------------  IN:    lactated ringers.: 900 mL  Total IN: 900 mL    OUT:  Total OUT: 0 mL    Total NET: 900 mL        DIET/FLUIDS: lactated ringers. 1000 milliLiter(s) IV Continuous <Continuous>  URINE:   07-18-20 @ 07:01  -  07-19-20 @ 07:00  --------------------------------------------------------  OUT: 25 mL       GI proph:    AC/ proph:   ABx: cefepime   IVPB 2000 milliGRAM(s) IV Intermittent every 12 hours  cefepime   IVPB          PHYSICAL EXAM:  General Appearance: NAD  HEENT: EOMI, sclera non-icteric.  Heart: RRR   Lungs: CTABL.   Abdomen:  Soft, mildly tender, mildy distended.   MSK/Extremities: Warm & well-perfused.   Skin: Warm, dry. No jaundice.       LABS  Culture - Urine (collected 17 Jul 2020 06:20)  Source: .Urine Catheterized  Preliminary Report (19 Jul 2020 12:38):    >100,000 CFU/ml Gram positive organisms    10,000 - 49,000 CFU/mL Gram Negative Rods

## 2020-07-20 NOTE — PROGRESS NOTE ADULT - SUBJECTIVE AND OBJECTIVE BOX
SUBJECTIVE:    Patient is a 74y old Female who presents with a chief complaint of abdominal pain (20 Jul 2020 08:05)    Currently admitted to medicine with the primary diagnosis of Hydronephrosis     Today is hospital day 9d. This morning she is resting comfortably in bed and reports no new issues. She is still is having frequent diarrhea but no abdominal pain. She could not tolerate go lightly last night. GI will figure out alternative regimen so patient can receive colonoscopy.    PAST MEDICAL & SURGICAL HISTORY  Osteoporosis  Obesity: bmi 41  COPD (chronic obstructive pulmonary disease)  Asthma: last attack 1/2018- hospitalized&amp; steroids  Diabetes  Hypertension  Breast cancer: left, radiation ~2 yrs ago  Cervical cancer: radiation&amp; chemo ~15 yrs ago  History of appendectomy  History of lumpectomy of left breast    SOCIAL HISTORY:  Negative for smoking/alcohol/drug use.     ALLERGIES:  iodinated radiocontrast agents (Other)  penicillin (Unknown)    MEDICATIONS:  STANDING MEDICATIONS  cefepime   IVPB 2000 milliGRAM(s) IV Intermittent every 12 hours  cefepime   IVPB      chlorhexidine 4% Liquid 1 Application(s) Topical <User Schedule>  lactated ringers. 1000 milliLiter(s) IV Continuous <Continuous>  latanoprost 0.005% Ophthalmic Solution 1 Drop(s) Both EYES at bedtime  montelukast 10 milliGRAM(s) Oral daily  saccharomyces boulardii 250 milliGRAM(s) Oral two times a day  simvastatin 20 milliGRAM(s) Oral at bedtime  tamoxifen 20 milliGRAM(s) Oral daily    PRN MEDICATIONS  ALBUTerol    90 MICROgram(s) HFA Inhaler 2 Puff(s) Inhalation every 6 hours PRN  diphenhydrAMINE 25 milliGRAM(s) Oral every 4 hours PRN    VITALS:   T(F): 97.6  HR: 114  BP: 98/62  RR: 19  SpO2: --    LABS:                        RADIOLOGY:    PHYSICAL EXAM:  GEN: No acute distress  LUNGS: Clear to auscultation bilaterally   HEART: Regular  ABD: Soft, non-tender, non-distended.  EXT: NC/NC/NE/2+PP/LUND/Skin Intact.   NEURO: AAOX3    Intravenous access:   NG tube:   Amin Catheter:   Indwelling Urethral Catheter:     Connect To:  Straight Drainage/[place]    Indication:  Urologic Procedure,  Surgery (07-13-20 @ 04:58) (not performed) [active]  Indwelling Urethral Catheter:     Connect To:  Straight Drainage/[place]    Indication:  Urinary Retention / Obstruction (07-18-20 @ 00:53) (not performed) [active]  Indwelling Urethral Catheter:     Connect To:  Straight Drainage/[place]    Indication:  Urine Output Monitoring in Critically Ill (07-18-20 @ 11:03) (not performed) [active]

## 2020-07-20 NOTE — PROGRESS NOTE ADULT - ASSESSMENT
A/P:  RILEY MARIE is a 74yFemale    Plan:   - OOBAT  - IS  - f/u vitals  - f/u labs  - DVT PPX  - GI PPX  - f/u w cardio  - f/u with GI after scope today

## 2020-07-20 NOTE — PROGRESS NOTE ADULT - ASSESSMENT
The patient is a 74 year-old female with a PMH of cervical cancer s/p radiation therapy 20 years ago, breast cancer s/p lumpectomy and radiation therapy 6 years ago, uterine cancer s/p carboplatin/paclitaxel 3 years ago followed by megestrol/tamoxifen by Dr. Taco Smith ([phone]) w/ metastases to bone, COPD (not on home O2), HTN, DM, CKD III, and DVT on Eliquis presenting for right sided abdominal pain and diarrhea, admitted for further workup.    # Colovesical fistula on new CT abd/pelvis contiguous w/ previously known pelvis mass, decreased in size  -patient was scheduled for colonsocopy today but only drank 10 % of the [place], procedure cancelled, patient aware  -Clear liquid diet today (no red dye)  -Magnesium citrate 2 bottles at 6pm (will discuss with the attending)  -NPO after midnight        THIS NOTE IS INCOMPLETE The patient is a 74 year-old female with a PMH of cervical cancer s/p radiation therapy 20 years ago, breast cancer s/p lumpectomy and radiation therapy 6 years ago, uterine cancer s/p carboplatin/paclitaxel 3 years ago followed by megestrol/tamoxifen by Dr. Taco Smith ([phone]) w/ metastases to bone, COPD (not on home O2), HTN, DM, CKD III, and DVT on Eliquis presenting for right sided abdominal pain and diarrhea, admitted for further workup.    # Colovesical fistula on new CT abd/pelvis contiguous w/ previously known pelvis mass, decreased in size  -patient was scheduled for colonsocopy today but only drank 10 % of the Golytely, procedure cancelled, patient aware  -Clear liquid diet today (no red dye)  -Magnesium citrate 2 bottles at 6pm (will discuss with the attending)  -NPO after midnight The patient is a 74 year-old female with a PMH of cervical cancer s/p radiation therapy 20 years ago, breast cancer s/p lumpectomy and radiation therapy 6 years ago, uterine cancer s/p carboplatin/paclitaxel 3 years ago followed by megestrol/tamoxifen by Dr. Taco Smith ([phone]) w/ metastases to bone, COPD (not on home O2), HTN, DM, CKD III, and DVT on Eliquis presenting for right sided abdominal pain and diarrhea, admitted for further workup.    # Colovesical fistula on new CT abd/pelvis contiguous w/ previously known pelvis mass, decreased in size  -patient was scheduled for colonsocopy today but only drank 10 % of the Golytely, procedure cancelled, patient aware  -Clear liquid diet today (no red dye)  -Magnesium citrate 2 bottles at 6pm   -NPO after midnight

## 2020-07-20 NOTE — CHART NOTE - NSCHARTNOTEFT_GEN_A_CORE
Registered Dietitian Follow-Up     Patient Profile Reviewed                           Yes [x]   No []     Nutrition History Previously Obtained        Yes [x]  No []       Pertinent Subjective Information:     Pertinent Medical Interventions:  #RLQ/suprapubic pain 2/2 obstructive pelvic mass  #Right sided hydronephrosis 2/2 pelvic mass with necrosis   #Colovesical fistula   1. pt was scheduled for coloscopy today. Cancelled d/t pt only tolerating 10% of Golytely. Rescheduled for Tuesday   2. plan for possible diversion ileostomy- pt notified surgeon this morning she does not want surgery.        Diet order: Clear liquid      Anthropometrics:  - Ht. 67"  - Wt. 147.9lbs; standing (7/12) - no new wts in EMR. RN made aware  - %wt change  - BMI 23.2 -- based on 147.9lbs  - IBW 135lbs      Pertinent Lab Data: (7/17) H/H 8.4/27.8, eGFR 45     Pertinent Meds: abx, lactated ringers @100ml/hr, simvastatin, tamoxifen     Physical Findings:  - Appearance: No edema noted per RN flow sheets  - GI function: Last BM   - Tubes: N/A  - Oral/Mouth cavity:  - Skin: WDL     Nutrition Requirements  Weight Used: CBW: 148#/67kg -- continued from initial RD assessment (7/13)     Estimated Energy Needs    Continue [x]  Adjust []  Calories: 8901-6228 kcal/day (MSJ x 1.2-1.5 AF)--increased needs d/t unintentional wt loss       Estimated Protein Needs    Continue [x]  Adjust []  Protein:  gm/day (1.2-1.5 gm/kg CBW)--same as above        Estimated Fluid Needs       Continue [x]  Adjust []  Fluid: 1 ml/kcal       Nutrient Intake:        [] Previous Nutrition Diagnosis:    1. Unintended Weight Loss            [x] Ongoing          [] Resolved  2. Inadequate protein energy intake            [x] Ongoing          [] Resolved    [] No active nutrition diagnosis identified at this time        Nutrition Intervention: meals and snacks      Goal/Expected Outcome: Pt to meet greater than 75% of estimated needs within 3 days      Indicator/Monitoring: diet order, energy intake, body comp, NFPF    Recommendations:   1. Registered Dietitian Follow-Up     Patient Profile Reviewed                           Yes [x]   No []     Nutrition History Previously Obtained        Yes [x]  No []       Pertinent Subjective Information: Pt tolerating clear liquids. No c/o N+V, constipation. Experiencing frequent diarrhea but no abdominal pain. Last BM (7/19) x 3. Asked pt if she had any food preferences and way I can accommodate her better, pt declined.      Pertinent Medical Interventions:  #RLQ/suprapubic pain 2/2 obstructive pelvic mass  #Right sided hydronephrosis 2/2 pelvic mass with necrosis   #Colovesical fistula   1. pt was scheduled for coloscopy today. Cancelled d/t pt only tolerating 10% of Golytely. Rescheduled for Tuesday   2. plan for possible diversion ileostomy- pt notified surgeon this morning she does not want surgery.   3. Palliative care following pt        Diet order: Clear liquid      Anthropometrics:  - Ht. 67"  - Wt. 147.9lbs; standing (7/12) - no new wts in EMR. RN made aware  - %wt change  - BMI 23.2 -- based on 147.9lbs  - IBW 135lbs      Pertinent Lab Data: (7/17) H/H 8.4/27.8, eGFR 45     Pertinent Meds: abx, lactated ringers @100ml/hr, simvastatin, tamoxifen     Physical Findings:  - Appearance: Alert and oriented No edema noted per RN flow sheets  - GI function: Last BM (7/19) x 3 Experiencing frequent diarrhea  - Tubes: N/A  - Oral/Mouth cavity: None noted  - Skin: WDL     Nutrition Requirements  Weight Used: CBW: 148#/67kg -- continued from initial RD assessment (7/13)     Estimated Energy Needs    Continue [x]  Adjust []  Calories: 2178-8726 kcal/day (MSJ x 1.2-1.5 AF)--increased needs d/t unintentional wt loss       Estimated Protein Needs    Continue [x]  Adjust []  Protein:  gm/day (1.2-1.5 gm/kg CBW)--same as above        Estimated Fluid Needs       Continue [x]  Adjust []  Fluid: 1 ml/kcal       Nutrient Intake: Poor <50%        [x] Previous Nutrition Diagnosis:    1. Unintended Weight Loss            [x] Ongoing          [] Resolved  2. Inadequate protein energy intake            [x] Ongoing          [] Resolved    [] No active nutrition diagnosis identified at this time        Nutrition Intervention: meals and snacks      Goal/Expected Outcome: Pt to maintain wt of 1-2# within CBW & to consume >50% of meals, snacks, & supplements within 3 days     Indicator/Monitoring: diet order, energy intake, body composition, glucose profile, nutrition focused physical findings    Recommendations:   1. Once medically feasible, advance pt to DASH/TLC with Prosource sugar-free gelatin twice daily   2. Obtain most recent wt and document in EMR. RN made aware   3. Continue to encourage po intake during meal times

## 2020-07-20 NOTE — PROGRESS NOTE ADULT - SUBJECTIVE AND OBJECTIVE BOX
Gastroenterology progress note:     Patient is a 74y old  Female who presents with a chief complaint of abdominal pain (20 Jul 2020 01:39)       Admitted on: 07-11-20    We are following the patient for colonoscopy before ileostomy      Interval History: patient was not able to drink the prep because of nausea. she is refusing to. she has soft to watery BM.     Patient's medical problems are stable    Prior records reviewed (Y/N): Y   History obtained from someone other than patient (Y/N): N      PAST MEDICAL & SURGICAL HISTORY:  Osteoporosis  Obesity: bmi 41  COPD (chronic obstructive pulmonary disease)  Asthma: last attack 1/2018- hospitalized&amp; steroids  Diabetes  Hypertension  Breast cancer: left, radiation ~2 yrs ago  Cervical cancer: radiation&amp; chemo ~15 yrs ago  History of appendectomy  History of lumpectomy of left breast      MEDICATIONS  (STANDING):  cefepime   IVPB 2000 milliGRAM(s) IV Intermittent every 12 hours  cefepime   IVPB      chlorhexidine 4% Liquid 1 Application(s) Topical <User Schedule>  lactated ringers. 1000 milliLiter(s) (100 mL/Hr) IV Continuous <Continuous>  latanoprost 0.005% Ophthalmic Solution 1 Drop(s) Both EYES at bedtime  montelukast 10 milliGRAM(s) Oral daily  saccharomyces boulardii 250 milliGRAM(s) Oral two times a day  simvastatin 20 milliGRAM(s) Oral at bedtime  tamoxifen 20 milliGRAM(s) Oral daily    MEDICATIONS  (PRN):  ALBUTerol    90 MICROgram(s) HFA Inhaler 2 Puff(s) Inhalation every 6 hours PRN Bronchospasm  diphenhydrAMINE 25 milliGRAM(s) Oral every 4 hours PRN Rash and/or Itching      Allergies  iodinated radiocontrast agents (Other)  penicillin (Unknown)      Review of Systems:   General: no fever  HEENT: no hemoptysis  Cardiovascular:  No Chest Pain, No Palpitations  Respiratory:  No Cough, No Dyspnea  Gastrointestinal:  As described in HPI  Hematology: no bruising or hematoma   Neurology: + generalized weakness  Skin: +sacral ulcer     Physical Examination:  T(C): 36.4 (07-20-20 @ 07:50), Max: 37.2 (07-19-20 @ 15:30)  HR: 114 (07-20-20 @ 07:50) (111 - 122)  BP: 98/62 (07-20-20 @ 07:50) (97/52 - 98/62)  RR: 19 (07-20-20 @ 07:50) (19 - 19)  SpO2: --    Constitutional: No acute distress.  Head: normocephalic  Neck: no palpable thyroid  Eyes: EOMI  Respiratory:  No signs of respiratory distress. Lung sounds are clear bilaterally.  Cardiovascular:  S1 S2, Regular rate and rhythm.  Abdominal: Abdomen is soft, symmetric, and non-tender without distention. Bowel sounds are present    Extremities: no pitting edema    Data: (reviewed by attending)    Hgb trend:  8.4  07-17-20 @ 08:32    Liver panel trend:  TBili 0.4   /   AST 13   /   ALT 9   /   AlkP 122   /   Tptn 5.7   /   Alb 2.2    /   DBili --      07-14  TBili 0.3   /   AST 16   /   ALT 9   /   AlkP 110   /   Tptn 5.4   /   Alb 2.1    /   DBili --      07-13  TBili 0.3   /   AST 12   /   ALT 9   /   AlkP 110   /   Tptn 5.5   /   Alb 2.2    /   DBili --      07-12  TBili 0.4   /   AST 15   /   ALT 10   /   AlkP 125   /   Tptn 5.9   /   Alb 2.4    /   DBili --      07-11

## 2020-07-20 NOTE — CHART NOTE - NSCHARTNOTEFT_GEN_A_CORE
Palliative care LMSW and physician Dr. Alejandre met with patient at bedside. Patient unable to complete colonoscopy today as scheduled as she was unable to drink the prep.  Plan is to reschedule for tomorrow.   Patient relating she has spoken with her oncologist and would be amenable to surgery if it was necessary. Symptoms addressed. All questions answered. Palliative care team will continue to monitor.

## 2020-07-21 NOTE — PROGRESS NOTE ADULT - NSHPATTENDINGPLANDISCUSS_GEN_ALL_CORE
patient, residents
resident, RN, CM
patient, residents
resident, RN, CM

## 2020-07-21 NOTE — PROGRESS NOTE ADULT - SUBJECTIVE AND OBJECTIVE BOX
RILEY MARIE  74y, Female    All available historical data reviewed    OVERNIGHT EVENTS:    none  ROS:  General: Denies rigors, nightsweats  HEENT: Denies headache, rhinorrhea, sore throat, eye pain  CV: Denies CP, palpitations  PULM: Denies wheezing, hemoptysis  GI: Denies hematemesis, hematochezia, melena  : Denies discharge, hematuria  MSK: Denies arthralgias, myalgias  SKIN: Denies rash, lesions  NEURO: Denies paresthesias, weakness  PSYCH: Denies depression, anxiety    VITALS:  T(F): 97.1, Max: 98.8 (07-20-20 @ 15:40)  HR: 118  BP: 97/54  RR: 19Vital Signs Last 24 Hrs  T(C): 36.2 (21 Jul 2020 13:19), Max: 37.1 (20 Jul 2020 15:40)  T(F): 97.1 (21 Jul 2020 13:19), Max: 98.8 (20 Jul 2020 15:40)  HR: 118 (21 Jul 2020 07:50) (118 - 121)  BP: 97/54 (21 Jul 2020 07:50) (90/53 - 98/53)  BP(mean): --  RR: 19 (21 Jul 2020 07:50) (18 - 19)  SpO2: --    TESTS & MEASUREMENTS:              Culture - Urine (collected 07-17-20 @ 06:20)  Source: .Urine Catheterized  Preliminary Report (07-19-20 @ 12:38):    >100,000 CFU/ml Gram positive organisms    10,000 - 49,000 CFU/mL Gram Negative Rods    Culture - Urine (collected 07-15-20 @ 12:20)  Source: .Urine Catheterized  Final Report (07-19-20 @ 12:26):    >100,000 CFU/ml Proteus mirabilis    50,000 - 99,000 CFU/mL Morganella morganii    10,000 - 49,000 CFU/mL Escherichia coli  Organism: Proteus mirabilis  Morganella morganii  Escherichia coli (07-19-20 @ 12:26)  Organism: Escherichia coli (07-19-20 @ 12:26)      -  Amikacin: S <=16      -  Amoxicillin/Clavulanic Acid: R >16/8      -  Ampicillin: R >16 These ampicillin results predict results for amoxicillin      -  Ampicillin/Sulbactam: R >16/8 Enterobacter, Citrobacter, and Serratia may develop resistance during prolonged therapy (3-4 days)      -  Aztreonam: S <=4      -  Cefazolin: R >16 (MIC_CL_COM_ENTERIC_CEFAZU) For uncomplicated UTI with K. pneumoniae, E. coli, or P. mirablis: SABIHA <=16 is sensitive and SABIHA >=32 is resistant. This also predicts results for oral agents cefaclor, cefdinir, cefpodoxime, cefprozil, cefuroxime axetil, cephalexin and locarbef for uncomplicated UTI. Note that some isolates may be susceptible to these agents while testing resistant to cefazolin.      -  Cefepime: S <=2      -  Cefoxitin: S <=8      -  Ceftriaxone: S <=1 Enterobacter, Citrobacter, and Serratia may develop resistance during prolonged therapy      -  Ciprofloxacin: R 1      -  Ertapenem: S <=0.5      -  Gentamicin: R >8      -  Levofloxacin: I 1      -  Meropenem: S <=1      -  Nitrofurantoin: I 64 Should not be used to treat pyelonephritis      -  Piperacillin/Tazobactam: S <=8      -  Tigecycline: S <=2      -  Tobramycin: S 4      -  Trimethoprim/Sulfamethoxazole: R >2/38      Method Type: SABIHA  Organism: Morganella morganii (07-19-20 @ 12:26)      -  Amikacin: S <=16      -  Amoxicillin/Clavulanic Acid: R <=8/4      -  Ampicillin: R <=8 These ampicillin results predict results for amoxicillin      -  Ampicillin/Sulbactam: S <=4/2 Enterobacter, Citrobacter, and Serratia may develop resistance during prolonged therapy (3-4 days)      -  Aztreonam: S <=4      -  Cefazolin: R <=2      -  Cefepime: S <=2      -  Cefoxitin: S <=8      -  Ceftriaxone: S <=1 Enterobacter, Citrobacter, and Serratia may develop resistance during prolonged therapy      -  Ciprofloxacin: S <=0.25      -  Ertapenem: S <=0.5      -  Gentamicin: S <=2      -  Imipenem: S <=1      -  Levofloxacin: S <=0.5      -  Meropenem: S <=1      -  Nitrofurantoin: R <=32 Should not be used to treat pyelonephritis      -  Piperacillin/Tazobactam: S <=8      -  Tigecycline: R <=2      -  Tobramycin: S <=2      -  Trimethoprim/Sulfamethoxazole: S <=0.5/9.5      Method Type: SABIHA  Organism: Proteus mirabilis (07-19-20 @ 12:26)      -  Amikacin: S <=16      -  Amoxicillin/Clavulanic Acid: S <=8/4      -  Ampicillin: S <=8 These ampicillin results predict results for amoxicillin      -  Ampicillin/Sulbactam: S <=4/2 Enterobacter, Citrobacter, and Serratia may develop resistance during prolonged therapy (3-4 days)      -  Aztreonam: S <=4      -  Cefazolin: S <=2 (MIC_CL_COM_ENTERIC_CEFAZU) For uncomplicated UTI with K. pneumoniae, E. coli, or P. mirablis: SABIHA <=16 is sensitive and SABIHA >=32 is resistant. This also predicts results for oral agents cefaclor, cefdinir, cefpodoxime, cefprozil, cefuroxime axetil, cephalexin and locarbef for uncomplicated UTI. Note that some isolates may be susceptible to these agents while testing resistant to cefazolin.      -  Cefepime: S <=2      -  Cefoxitin: S <=8      -  Ceftriaxone: S <=1 Enterobacter, Citrobacter, and Serratia may develop resistance during prolonged therapy      -  Ciprofloxacin: S <=0.25      -  Ertapenem: S <=0.5      -  Gentamicin: S <=2      -  Levofloxacin: S <=0.5      -  Meropenem: S <=1      -  Nitrofurantoin: R >64 Should not be used to treat pyelonephritis      -  Piperacillin/Tazobactam: S <=8      -  Tobramycin: S <=2      -  Trimethoprim/Sulfamethoxazole: S <=0.5/9.5      Method Type: SABIHA            RADIOLOGY & ADDITIONAL TESTS:  Personal review of radiological diagnostics performed  Echo and EKG results noted when applicable.     MEDICATIONS:  ALBUTerol    90 MICROgram(s) HFA Inhaler 2 Puff(s) Inhalation every 6 hours PRN  cefepime   IVPB 2000 milliGRAM(s) IV Intermittent every 12 hours  cefepime   IVPB      chlorhexidine 4% Liquid 1 Application(s) Topical <User Schedule>  diphenhydrAMINE 25 milliGRAM(s) Oral every 4 hours PRN  enoxaparin Injectable 70 milliGRAM(s) SubCutaneous two times a day  lactated ringers. 1000 milliLiter(s) IV Continuous <Continuous>  latanoprost 0.005% Ophthalmic Solution 1 Drop(s) Both EYES at bedtime  montelukast 10 milliGRAM(s) Oral daily  ondansetron Injectable 4 milliGRAM(s) IV Push every 6 hours PRN  saccharomyces boulardii 250 milliGRAM(s) Oral two times a day  simvastatin 20 milliGRAM(s) Oral at bedtime  tamoxifen 20 milliGRAM(s) Oral daily      ANTIBIOTICS:  cefepime   IVPB 2000 milliGRAM(s) IV Intermittent every 12 hours  cefepime   IVPB

## 2020-07-21 NOTE — PROGRESS NOTE ADULT - ASSESSMENT
· Assessment		  # Resolved RLQ abdominal pain, secondary to Right sided hydronephrosis due to ureteral obstruction by pelvic mass  - CT A/P shows Previously 11.7 cm pelvic mass now measuring 7 cm and containing air-fluid levels. Findings may reflect necrotic component versus postsurgical change and severe right hydronephrosis and dilatation of the right ureter suggesting obstruction secondary to encasement of the right ureter by the pelvic mass.  -pelvic mass is in all probability tumor with central necrosis and not of infectious etiology  -Urine very purulent with pyuria and UCx mixed maria guadalupe  -will treat as pyelonephritis for now  -would not expect the right hydronephrosis to resolve as there is in all probability a distal tumor obstruction of the ureter  -UCx p mirabilis, Morganella, E col    RECOMMENDATIONS;   po vantin 200 mg q12h for 12 more days  recall prn please

## 2020-07-21 NOTE — PROGRESS NOTE ADULT - ASSESSMENT
Consult Summary  74 year old woman with history of uterine cancer 20 years ago s/p radiation, history of breast cancer s/p lumpectomy + radiation,  COPD presented with diarrhea and abdominal pain. Hospital course complicated by evidence of hydronephresis in the setting of likely obstruction, evidence of an obstructing necrotic pelvic mass, and evidence of a colovesical fistula.  Palliative care consulted for GOC.    Patient seen at bedside with family member. Patient unable to tolerate and complete colonoscopy prep last night, and at this time, GI signing off. Primary team to speak with ID and the patient's oncologist regarding recommendations and possibility of treatment moving forward prior to next steps. Patient with some nausea today, hoping for an IV version for nausea relief.    Morphine Equivalent Daily Dose (MEDD):  NA    Recommendations:  - Full code, ongoing medical management per primary team  - HCP filled out with patient naming her niece, Temitope Green, her HCP  - GI signed off; patient no longer going to get colonoscopy  - patient denies pain  - recommend changing zofran to IV - 4mg IV q6h PRN for nausea  - Primary team to speak with ID and the patient's oncologist regarding recommendations and possibility of cancer treatment moving forward  - Palliative care will continue to follow and be available for GOC discussions as appropriate.    Please Call [phone] PRN

## 2020-07-21 NOTE — CHART NOTE - NSCHARTNOTEFT_GEN_A_CORE
patient was scheduled for colonoscopy  last week refused colonoscopy  on Sunday she refused golyteley  last night only drank 1.5 bottles of magnesium citrate and refused water enemas despite explaining the need for clean prep for proper colonoscopy  The patient was counseled again about need for colonoscopy before ileostomy and about the need for a preparation but she refuses to have another prep    Medicine team contacted and updated  procedure is cancelled  will sign off  Call [phone] for questions or when the patient is ready for the procedure

## 2020-07-21 NOTE — PROGRESS NOTE ADULT - CARDIOVASCULAR
Regular rate & rhythm, normal S1, S2; no murmurs, gallops or rubs; no S3, S4
detailed exam
Regular rate & rhythm, normal S1, S2; no murmurs, gallops or rubs; no S3, S4

## 2020-07-21 NOTE — PROGRESS NOTE ADULT - SUBJECTIVE AND OBJECTIVE BOX
SUBJECTIVE:    Patient is a 74y old Female who presents with a chief complaint of abdominal pain (20 Jul 2020 15:58)    Currently admitted to medicine with the primary diagnosis of Hydronephrosis     Today is hospital day 10d. This morning she is resting comfortably in bed. Last night she had the Magnesium citrate and the doculex. However she was very nauseas and vomited so she never had the enemas last night. Overnight she has had multiple loose green bowel movements. This morning she complains of new diffuse abdominal pain.    PAST MEDICAL & SURGICAL HISTORY  Osteoporosis  Obesity: bmi 41  COPD (chronic obstructive pulmonary disease)  Asthma: last attack 1/2018- hospitalized&amp; steroids  Diabetes  Hypertension  Breast cancer: left, radiation ~2 yrs ago  Cervical cancer: radiation&amp; chemo ~15 yrs ago  History of appendectomy  History of lumpectomy of left breast    SOCIAL HISTORY:  Negative for smoking/alcohol/drug use.     ALLERGIES:  iodinated radiocontrast agents (Other)  penicillin (Unknown)    MEDICATIONS:  STANDING MEDICATIONS  cefepime   IVPB 2000 milliGRAM(s) IV Intermittent every 12 hours  cefepime   IVPB      chlorhexidine 4% Liquid 1 Application(s) Topical <User Schedule>  lactated ringers. 1000 milliLiter(s) IV Continuous <Continuous>  latanoprost 0.005% Ophthalmic Solution 1 Drop(s) Both EYES at bedtime  montelukast 10 milliGRAM(s) Oral daily  saccharomyces boulardii 250 milliGRAM(s) Oral two times a day  simvastatin 20 milliGRAM(s) Oral at bedtime  tamoxifen 20 milliGRAM(s) Oral daily    PRN MEDICATIONS  ALBUTerol    90 MICROgram(s) HFA Inhaler 2 Puff(s) Inhalation every 6 hours PRN  diphenhydrAMINE 25 milliGRAM(s) Oral every 4 hours PRN  ondansetron    Tablet 4 milliGRAM(s) Oral every 6 hours PRN    VITALS:   T(F): 97.1  HR: 120  BP: 98/53  RR: 18  SpO2: --    LABS:                        RADIOLOGY:    PHYSICAL EXAM:  GEN: No acute distress  LUNGS: Clear to auscultation bilaterally   HEART: Regular  ABD: Soft,  non-distended, LLQ TTP  EXT: NC/NC/NE/2+PP/LUND/Skin Intact.   NEURO: AAOX3    Intravenous access:   NG tube:   Amin Catheter:   Indwelling Urethral Catheter:     Connect To:  Straight Drainage/[place]    Indication:  Urologic Procedure,  Surgery (07-13-20 @ 04:58) (not performed) [active]  Indwelling Urethral Catheter:     Connect To:  Straight Drainage/[place]    Indication:  Urinary Retention / Obstruction (07-18-20 @ 00:53) (not performed) [active]  Indwelling Urethral Catheter:     Connect To:  Straight Drainage/[place]    Indication:  Urine Output Monitoring in Critically Ill (07-18-20 @ 11:03) (not performed) [active] SUBJECTIVE:    Patient is a 74y old Female who presents with a chief complaint of abdominal pain (20 Jul 2020 15:58)    Currently admitted to medicine with the primary diagnosis of Hydronephrosis     Today is hospital day 10d. This morning she is resting comfortably in bed. Last night she had the Magnesium citrate and the doculex. However she was very nauseas and vomited so she never had the enemas last night. Overnight she has had multiple loose green bowel movements. This morning she complains ofdiffuse abdominal pain.    PAST MEDICAL & SURGICAL HISTORY  Osteoporosis  Obesity: bmi 41  COPD (chronic obstructive pulmonary disease)  Asthma: last attack 1/2018- hospitalized&amp; steroids  Diabetes  Hypertension  Breast cancer: left, radiation ~2 yrs ago  Cervical cancer: radiation&amp; chemo ~15 yrs ago  History of appendectomy  History of lumpectomy of left breast    SOCIAL HISTORY:  Negative for smoking/alcohol/drug use.     ALLERGIES:  iodinated radiocontrast agents (Other)  penicillin (Unknown)    MEDICATIONS:  STANDING MEDICATIONS  cefepime   IVPB 2000 milliGRAM(s) IV Intermittent every 12 hours  cefepime   IVPB      chlorhexidine 4% Liquid 1 Application(s) Topical <User Schedule>  lactated ringers. 1000 milliLiter(s) IV Continuous <Continuous>  latanoprost 0.005% Ophthalmic Solution 1 Drop(s) Both EYES at bedtime  montelukast 10 milliGRAM(s) Oral daily  saccharomyces boulardii 250 milliGRAM(s) Oral two times a day  simvastatin 20 milliGRAM(s) Oral at bedtime  tamoxifen 20 milliGRAM(s) Oral daily    PRN MEDICATIONS  ALBUTerol    90 MICROgram(s) HFA Inhaler 2 Puff(s) Inhalation every 6 hours PRN  diphenhydrAMINE 25 milliGRAM(s) Oral every 4 hours PRN  ondansetron    Tablet 4 milliGRAM(s) Oral every 6 hours PRN    VITALS:   T(F): 97.1  HR: 120  BP: 98/53  RR: 18  SpO2: --    LABS:                        RADIOLOGY:    PHYSICAL EXAM:  GEN: No acute distress  LUNGS: Clear to auscultation bilaterally   HEART: Regular  ABD: Soft,  non-distended, LLQ TTP  EXT: NC/NC/NE/2+PP/LUND/Skin Intact.   NEURO: AAOX3    Intravenous access:   NG tube:   Amin Catheter:   Indwelling Urethral Catheter:     Connect To:  Straight Drainage/[place]    Indication:  Urologic Procedure,  Surgery (07-13-20 @ 04:58) (not performed) [active]  Indwelling Urethral Catheter:     Connect To:  Straight Drainage/[place]    Indication:  Urinary Retention / Obstruction (07-18-20 @ 00:53) (not performed) [active]  Indwelling Urethral Catheter:     Connect To:  Straight Drainage/[place]    Indication:  Urine Output Monitoring in Critically Ill (07-18-20 @ 11:03) (not performed) [active] SUBJECTIVE:    Patient is a 74y old Female who presents with a chief complaint of abdominal pain (20 Jul 2020 15:58)    Currently admitted to medicine with the primary diagnosis of Hydronephrosis     Today is hospital day 10d. This morning she is resting comfortably in bed. Last night she had the Magnesium citrate and the doculex. However she was very nauseas and vomited so she never had the enemas last night. Overnight she has had multiple loose green bowel movements. This morning she complains of diffuse abdominal pain.    PAST MEDICAL & SURGICAL HISTORY  Osteoporosis  Obesity: bmi 41  COPD (chronic obstructive pulmonary disease)  Asthma: last attack 1/2018- hospitalized&amp; steroids  Diabetes  Hypertension  Breast cancer: left, radiation ~2 yrs ago  Cervical cancer: radiation&amp; chemo ~15 yrs ago  History of appendectomy  History of lumpectomy of left breast    SOCIAL HISTORY:  Negative for smoking/alcohol/drug use.     ALLERGIES:  iodinated radiocontrast agents (Other)  penicillin (Unknown)    MEDICATIONS:  STANDING MEDICATIONS  cefepime   IVPB 2000 milliGRAM(s) IV Intermittent every 12 hours  cefepime   IVPB      chlorhexidine 4% Liquid 1 Application(s) Topical <User Schedule>  lactated ringers. 1000 milliLiter(s) IV Continuous <Continuous>  latanoprost 0.005% Ophthalmic Solution 1 Drop(s) Both EYES at bedtime  montelukast 10 milliGRAM(s) Oral daily  saccharomyces boulardii 250 milliGRAM(s) Oral two times a day  simvastatin 20 milliGRAM(s) Oral at bedtime  tamoxifen 20 milliGRAM(s) Oral daily    PRN MEDICATIONS  ALBUTerol    90 MICROgram(s) HFA Inhaler 2 Puff(s) Inhalation every 6 hours PRN  diphenhydrAMINE 25 milliGRAM(s) Oral every 4 hours PRN  ondansetron    Tablet 4 milliGRAM(s) Oral every 6 hours PRN    VITALS:   T(F): 97.1  HR: 120  BP: 98/53  RR: 18  SpO2: --    LABS:                        RADIOLOGY:    PHYSICAL EXAM:  GEN: No acute distress  LUNGS: Clear to auscultation bilaterally   HEART: Regular  ABD: Soft,  non-distended, LLQ TTP  EXT: NC/NC/NE/2+PP/LUND/Skin Intact.   NEURO: AAOX3    Intravenous access:   NG tube:   Amin Catheter:   Indwelling Urethral Catheter:     Connect To:  Straight Drainage/[place]    Indication:  Urologic Procedure,  Surgery (07-13-20 @ 04:58) (not performed) [active]  Indwelling Urethral Catheter:     Connect To:  Straight Drainage/[place]    Indication:  Urinary Retention / Obstruction (07-18-20 @ 00:53) (not performed) [active]  Indwelling Urethral Catheter:     Connect To:  Straight Drainage/[place]    Indication:  Urine Output Monitoring in Critically Ill (07-18-20 @ 11:03) (not performed) [active]

## 2020-07-21 NOTE — PROGRESS NOTE ADULT - SUBJECTIVE AND OBJECTIVE BOX
Brief HPI  74 year old woman with history of uterine cancer 20 years ago s/p radiation, history of breast cancer s/p lumpectomy + radiation,  COPD presented with diarrhea and abdominal pain. Hospital course complicated by evidence of hydronephrosis in the setting of likely obstruction, evidence of an obstructing necrotic pelvic mass, and evidence of a colovesical fistula.  Palliative care consulted for GOC.    Interval History  -Patient seen at bedside with family member  -Patient unable to tolerate and complete colonoscopy prep last night, and at this time, GI signing off  -Primary team to speak with ID and the patient's oncologist regarding recommendations and possibility of treatment moving forward prior to next steps  -Patient with some nausea today, hoping for an IV version for nausea relief    PHYSICAL EXAM    T(C): , Max: 37.1 (15:40)  T(F): 97.1  HR: 118 (118 - 121)  BP: 97/54 (90/53 - 98/53)  RR: 19 (18 - 19)  SpO2: --    GEN:  NAD, lying in bed  HEENT; MMM, EOMI  CV: no tachycardia  Lungs: no wheezing, no accessory muscle use  Neuro: no focal deficits          LABS:                                                                                                                                               MEDICATIONS  (STANDING):  cefepime   IVPB 2000 milliGRAM(s) IV Intermittent every 12 hours  cefepime   IVPB      chlorhexidine 4% Liquid 1 Application(s) Topical <User Schedule>  enoxaparin Injectable 70 milliGRAM(s) SubCutaneous two times a day  lactated ringers. 1000 milliLiter(s) (100 mL/Hr) IV Continuous <Continuous>  latanoprost 0.005% Ophthalmic Solution 1 Drop(s) Both EYES at bedtime  montelukast 10 milliGRAM(s) Oral daily  saccharomyces boulardii 250 milliGRAM(s) Oral two times a day  simvastatin 20 milliGRAM(s) Oral at bedtime  tamoxifen 20 milliGRAM(s) Oral daily    MEDICATIONS  (PRN):  ALBUTerol    90 MICROgram(s) HFA Inhaler 2 Puff(s) Inhalation every 6 hours PRN Bronchospasm  diphenhydrAMINE 25 milliGRAM(s) Oral every 4 hours PRN Rash and/or Itching  ondansetron Injectable 4 milliGRAM(s) IV Push every 6 hours PRN Nausea and/or Vomiting

## 2020-07-21 NOTE — PROGRESS NOTE ADULT - ASSESSMENT
######################75 yo F with PMH of cervical cancer s/p radiation therapy (20 years ago), breast cancer (ER+ / [place]+) s/p lumpectomy + radiation thereby (6 years ago), uterine cancer s/p chemotherapy (3 years ago) as per patient, 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     #RLQ/suprapubic pain 2/2 obstructive pelvic mass  #Right sided hydronephrosis 2/2 pelvic mass with necrosis   #Colovesical fistula   Necrotic pelvic mass seen on imaging from [facility] with possible evidence of fistulization to the cecum (records in chart)   Seen by IR no perc nephrostomy recommended   -catherine in place but patient is still urinating on herself   -uro and gen surg eval noted, plan for possible diversion ileostomy. Pt confirmed will palliative that she wants to do the surgery if needed  -Colonoscopy on Tuesday as patient could not tolerate go lightly; on clear liquids now  -Will follow up with GI for regimen    #leukocytosis, possible pyelonephritis  possibly 2/2 necrotic pelvic mass vs pyelo  On cefepime 2g q12h    #chronic anemia likely 2/2 iron deficiency   cont iron supplementation  trend cbc transfuse if hgb <7  maintain active type and screen     #hx of right LE DVT    - holding Eliquis, bridging with Lovenox     Code: Full, Palliative following 73 yo F with PMH of cervical cancer s/p radiation therapy (20 years ago), breast cancer (ER+ / [place]+) s/p lumpectomy + radiation thereby (6 years ago), uterine cancer s/p chemotherapy (3 years ago) as per patient, 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     #RLQ/suprapubic pain 2/2 obstructive pelvic mass  #Right sided hydronephrosis 2/2 pelvic mass with necrosis   #Colovesical fistula  #New LLQ pain   Necrotic pelvic mass seen on imaging from [facility] with possible evidence of fistulization to the cecum (records in chart)   Seen by IR no perc nephrostomy recommended   -catherine in place but patient is still urinating on herself   -uro and gen surg eval noted, plan for possible diversion ileostomy. Pt confirmed will palliative that she wants to do the surgery if needed  -Colonoscopy today assuming GI approves    #leukocytosis, possible pyelonephritis  possibly 2/2 necrotic pelvic mass vs pyelo  On cefepime 2g q12h    #chronic anemia likely 2/2 iron deficiency   cont iron supplementation  trend cbc transfuse if hgb <7  maintain active type and screen     #hx of right LE DVT    - holding Eliquis, bridging with Lovenox     Code: Full, Palliative following 73 yo F with PMH of cervical cancer s/p radiation therapy (20 years ago), breast cancer (ER+ / [place]+) s/p lumpectomy + radiation thereby (6 years ago), uterine cancer s/p chemotherapy (3 years ago) as per patient, 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     #RLQ/suprapubic pain 2/2 obstructive pelvic mass  #Right sided hydronephrosis 2/2 pelvic mass with necrosis   #Colovesical fistula  #New LLQ pain   Necrotic pelvic mass seen on imaging from [facility] with possible evidence of fistulization to the cecum (records in chart)   Seen by IR no perc nephrostomy recommended   -catherine in place but patient is still urinating on herself   -Colonoscopy cancelled as patient could not tolerate prep  -Pt should keep catherine in place and f/u outpatient with urology 1 week after discharge    #leukocytosis, possible pyelonephritis  possibly 2/2 necrotic pelvic mass vs pyelo  On cefepime 2g q12h  -f/u ID recs    #chronic anemia likely 2/2 iron deficiency   cont iron supplementation  trend cbc transfuse if hgb <7  maintain active type and screen     #hx of right LE DVT    - holding Eliquis, bridging with Lovenox     Code: Full, Palliative following 75 yo F with PMH of cervical cancer s/p radiation therapy (20 years ago), breast cancer (ER+ / [place]+) s/p lumpectomy + radiation thereby (6 years ago), uterine cancer s/p chemotherapy (3 years ago) as per patient, 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     #RLQ/suprapubic pain 2/2 obstructive pelvic mass  #Right sided hydronephrosis 2/2 pelvic mass with necrosis   #Colovesical fistula  #New LLQ pain   Necrotic pelvic mass seen on imaging from [facility] with possible evidence of fistulization to the cecum (records in chart)   Seen by IR no perc nephrostomy recommended   -catherine in place but patient is still urinating on herself   -Colonoscopy cancelled as patient could not tolerate prep  -Pt should keep catherine in place and f/u outpatient with urology 1 week after discharge    #leukocytosis, possible pyelonephritis  possibly 2/2 necrotic pelvic mass vs pyelo  On cefepime 2g q12h  -f/u ID recs    #chronic anemia likely 2/2 iron deficiency   cont iron supplementation  trend cbc transfuse if hgb <7  maintain active type and screen     #hx of right LE DVT    - holding Eliquis, bridging with Lovenox, restart Eliquis     Code: Full, Palliative following

## 2020-07-22 NOTE — CONSULT NOTE ADULT - PROVIDER SPECIALTY LIST ADULT
Palliative Care
Cardiology
GYN
Intervent Radiology
Intervent Radiology
Urology
Colorectal Surgery
Gastroenterology
Infectious Disease

## 2020-07-22 NOTE — CONSULT NOTE ADULT - CONSULT REQUESTED DATE/TIME
11-Jul-2020 20:30
12-Jul-2020 09:02
13-Jul-2020 18:46
14-Jul-2020 12:38
16-Jul-2020 14:55
18-Jul-2020 00:47
18-Jul-2020 23:15
22-Jul-2020 17:19
15-Jul-2020 14:50

## 2020-07-22 NOTE — PROGRESS NOTE ADULT - ASSESSMENT
Amin in vaginal vault, Amin catheter removed and new 16 Fr. Amin Placed with + dark yellow urine output.      Plan:  No acute  intervention .   Cont. Comfort care  Cont Amin catheter

## 2020-07-22 NOTE — GOALS OF CARE CONVERSATION - ADVANCED CARE PLANNING - CONVERSATION DETAILS
Patients clinical situation is worsening given worsening creatinine, worsening mental status and fatigue, increasing WBC. This was explained to Temitope who is the patients Health Care Proxy. Temitope expressed that the most important thing at this point was the comfort of the patient. Code status was then discussed and Temitope decided to make the patient DNR/DNI. Subsequently the concept of comfort measures only (CMO) was introduced. Temitope expressed that CMO was what she wished for the patient. MOLST form was then discussed and we went through the form in detail, covering each point. Form was then signed. Hospice consult placed.

## 2020-07-22 NOTE — PROGRESS NOTE ADULT - SUBJECTIVE AND OBJECTIVE BOX
Subjective:   75 yo F with PMH of Left Breast CA (radiation 2 years ago), Cervical CA (chemoradiation 15 years ago), COPD, DM, HTN, osteoporosis presents to ED with R sided abdominal pain and diarrhea x 3 weeks. Urology called for CT A/P showing severe right hydronephrosis and dilatation of R ureter suggesting obstruction 2* encasement of right ureter by pelvic mass (7cm).  Amin catheter with fecaluria  r/o colovesical fistula.   Pt. is comfort care.  Urology  called Amin not draining, Catheter appears posterior to bladder on Bladder US  Pt. seen and examined, US reviewed Amin catheter appears in vaginal vault. Amin catheter removed, 16 Fr. Amin catheter placed, + dark urine output.     ROS:   [ x ] A 10 Point Review of Systems was negative except where noted  [    ] Due to altered mental status/intubation, subjective information was not able to be obtained from the patient. History was obtained to the extent possible from review of the chart and collateral sources of information.     ALBUTerol    90 MICROgram(s) HFA Inhaler 2 Puff(s) Inhalation every 6 hours PRN  apixaban 5 milliGRAM(s) Oral every 12 hours  chlorhexidine 4% Liquid 1 Application(s) Topical <User Schedule>  diphenhydrAMINE 25 milliGRAM(s) Oral every 4 hours PRN  lactated ringers. 1000 milliLiter(s) IV Continuous <Continuous>  latanoprost 0.005% Ophthalmic Solution 1 Drop(s) Both EYES at bedtime  montelukast 10 milliGRAM(s) Oral daily  ondansetron Injectable 4 milliGRAM(s) IV Push every 6 hours PRN  saccharomyces boulardii 250 milliGRAM(s) Oral two times a day        Vital Signs Last 24 Hrs  T(C): 36.1 (22 Jul 2020 15:48), Max: 36.5 (22 Jul 2020 00:02)  T(F): 96.9 (22 Jul 2020 15:48), Max: 97.7 (22 Jul 2020 00:02)  HR: 115 (22 Jul 2020 15:48) (111 - 121)  BP: 106/58 (22 Jul 2020 15:48) (83/56 - 106/58)  RR: 18 (22 Jul 2020 15:48) (18 - 19)      PE  Constitutional: NAD, age- appropriate development.    HEENT: NC/AT, EOMI  Neck: no pain  Back: No CVA tenderness  Respiratory: No accessory respiratory muscle use  Abd: Soft, mildly distended, mildly tender to palpation.   :  Amin catheter appears in vaginal vault. Amin catheter removed, 16 Fr. Amin catheter placed, + dark urine output.   Extremities: no edema  Neurological: A/O x 3  Psychiatric: Normal mood, normal affect  Skin: No rashes        LABS:                        7.1    13.87 )-----------( 433      ( 22 Jul 2020 00:38 )             22.3     07-22    139  |  105  |  23<H>  ----------------------------<  72  3.2<L>   |  15<L>  |  2.3<H>    Ca    7.9<L>      22 Jul 2020 00:38  Mg     1.9     07-22    TPro  5.3<L>  /  Alb  2.0<L>  /  TBili  0.3  /  DBili  x   /  AST  7   /  ALT  <5  /  AlkPhos  94  07-22       Culture - Urine (07.17.20 @ 06:20)    -  Amikacin: S <=16    -  Amoxicillin/Clavulanic Acid: S <=8/4    -  Ampicillin: R >8 Predicts results to ampicillin/sulbactam, amoxacillin-clavulanate and  piperacillin-tazobactam.    -  Ampicillin: S <=8 These ampicillin results predict results for amoxicillin    -  Ampicillin/Sulbactam: S <=4/2 Enterobacter, Citrobacter, and Serratia may develop resistance during prolonged therapy (3-4 days)    -  Aztreonam: S <=4    -  Cefazolin: S <=2 (MIC_CL_COM_ENTERIC_CEFAZU) For uncomplicated UTI with K. pneumoniae, E. coli, or P. mirablis: SABIHA <=16 is sensitive and SABIHA >=32 is resistant. This also predicts results for oral agents cefaclor, cefdinir, cefpodoxime, cefprozil, cefuroxime axetil, cephalexin and locarbef for uncomplicated UTI. Note that some isolates may be susceptible to these agents while testing resistant to cefazolin.    -  Cefepime: S <=2    -  Cefoxitin: S <=8    -  Ceftriaxone: S <=1 Enterobacter, Citrobacter, and Serratia may develop resistance during prolonged therapy    -  Ertapenem: S <=0.5    -  Gentamicin: S <=2    -  Ciprofloxacin: R >2    -  Ciprofloxacin: S <=0.25    -  Daptomycin: S 4    -  Levofloxacin: R >4    -  Levofloxacin: S <=0.5    -  Linezolid: S <=1    -  Meropenem: S <=1    -  Nitrofurantoin: S <=32 Should not be used to treat pyelonephritis.    -  Nitrofurantoin: R >64 Should not be used to treat pyelonephritis    -  Piperacillin/Tazobactam: S <=8    -  Tetra/Doxy: S <=4    -  Tigecycline: S <=2    -  Tobramycin: S <=2    -  Trimethoprim/Sulfamethoxazole: S <=0.5/9.5    -  Vancomycin: R >16    Specimen Source: .Urine Catheterized    Culture Results:   >100,000 CFU/ml Enterococcus faecium (vancomycin resistant)  10,000 - 49,000 CFU/mL Proteus mirabilis    Organism Identification: Enterococcus faecium (vancomycin resistant)  Proteus mirabilis    Organism: Enterococcus faecium (vancomycin resistant)    Organism: Proteus mirabilis    Method Type: SABIHA    Method Type: SABIHA        RADIOLOGY & ADDITIONAL STUDIES:   < from: US Abdomen Limited (07.12.20 @ 11:40) >    EXAM:  US ABDOMEN LIMITED            PROCEDURE DATE:  07/12/2020            INTERPRETATION:  CLINICAL HISTORY: History of uterine cancer. Hepatic lesion seen on CT from 7/11/2020..    COMPARISON: Abdominal ultrasound dated 1/23/2018 and correlationmade to abdominal CT dated 7/11/2020..    PROCEDURE: Ultrasound of the right upper quadrant was performed.    FINDINGS:    LIVER:  Normal in contour. Increased liver parenchymal echogenicity. Liver measures 14.9 cm in length.  1.2 x 1.9 cm hypoechoiclesion in the left lobe of the liver.    GALLBLADDER/BILIARY TREE:  Gallbladder sludge, no calculi.Gallbladder wall thickness measures 3 mm, which is on the upper limit of normal. No wall thickening or pericholecystic fluid. Negative sonographic Keita's sign. No intrahepatic biliary ductal dilatation. The common bile duct measures 4 mm, which is normal.    PANCREAS:  obscured by overlying bowel gas.    KIDNEY:  Right kidney measures 9.7 cm in length. Moderate hydronephrosis. There is a 3.9 cm calyceal diverticulum. No evidence of renal calculi.    AORTA/IVC:  Visualized proximal portions unremarkable.    ASCITES:  Mild to moderate perihepatic ascites.    IMPRESSION:    1.  1.2 x 1.9 cm hypoechoic lesion in the left lobe of the liver. This issuspicious for a metastatic focus. Recommend MRI of the abdomen with IV contrast for further characterization.    2.  Moderate hydronephrosis of the right kidney, which has increased in size when compared to previous ultrasound dated 1/23/2018.    3. Right renal calyceal diverticulum, stable.    4.  Mild to moderate perihepatic ascites.        ISABELLA WILSON M.D., RESIDENT RADIOLOGIST  This document has been electronically signed.  RAYNA HIDALGO M.D., ATTENDING RADIOLOGIST  This document has been electronically signed. Jul 13 2020  8:45AM    < end of copied text >    < from: US Kidney and Bladder (07.22.20 @ 12:46) >    EXAM:  US KIDNEYS AND BLADDER            PROCEDURE DATE:  07/22/2020            INTERPRETATION:  CLINICAL HISTORY: DESTIN, hydronephrosis    COMPARISON: Abdominal ultrasound dated 7/12/2020. Correlation made to abdominal CT dated 7/11/2020    PROCEDURE: Retroperitoneal Ultrasound was performed.    FINDINGS:    RIGHT KIDNEY: Kidney measures 11.4 cm in length. Moderate hydronephrosis. No evidence of calculus or solid mass. Vascular flow is demonstrated at the hilum.    LEFT KIDNEY: Normal in echogenicity, size measuring 10.8 cm in length. No evidence of hydronephrosis, calculus or solid mass. Vascular flow is demonstrated at the hilum.     URINARY BLADDER: Prevoid volume of approximately 90 cc.  No wall thickening, debris or calculus is seen. Amin catheter and balloon appear posterior to the urinary bladder.    OTHER: Abdominal ascites is noted.    IMPRESSION:    Moderate hydronephrosis of the right kidney, unchanged from previous exam dating 7/12/2020.    Amin catheter and balloon appear outside of the bladder, clinical correlation is required.        ISABELLA WILSON M.D., RESIDENT RADIOLOGIST  This document has been electronically signed.  RAYNA HIDALGO M.D., ATTENDING RADIOLOGIST  This document has been electronically signed.Jul 22 2020  1:47PM              < end of copied text >

## 2020-07-22 NOTE — CHART NOTE - NSCHARTNOTEFT_GEN_A_CORE
Palliative care chart note - patient off floor at time of visit    74 year old woman with history of uterine cancer 20 years ago s/p radiation, history of breast cancer s/p lumpectomy + radiation,  COPD presented with diarrhea and abdominal pain. Hospital course complicated by evidence of hydronephrosis in the setting of likely obstruction, evidence of an obstructing necrotic pelvic mass, and evidence of a colovesical fistula.  Palliative care consulted for GOC.    Patient off floor at time of visit today.  Over 24 hours, patient with worsening renal function. Primary team consulting renal for possible nephrostomy tube placement.    Palliative care will continue to follow and be available for GOC discussions as appropriate.    [phone]

## 2020-07-22 NOTE — DISCHARGE NOTE PROVIDER - HOSPITAL COURSE
73 yo F with PMH of cervical cancer s/p radiation therapy (20 years ago), breast cancer (ER+ / [place]+) s/p lumpectomy + radiation thereby (6 years ago), uterine cancer  s/p chemotherapy ( 3 years ago) as per patient, metastasis to bones, COPD not on home oxygen, DM, HTN, osteoporosis, DVT on eliquis, Anemia of chronic disease, CKD 3 presents to ED with gradual onset, episodic, 7/10 RLQ abdominal pain radiating to groin for past 2 days, with no aggravating or relieving factors. Pt reports 100lb weight loss in past 1 year due to malignant cancer. Pt also reports persistent non bloody, non mucoid, watery diarrhea for past 3 weeks, 4-5 episodes per day, not associated with food intake. In the ED, vitals were WNL,  Pt received 1L fluid bolus, CT A/P showed severe right hydronephrosis and dilatation of R ureter suggesting obstruction  due to encasement of right ureter. Urology was consulted, recommended no acute intervention at this time, pt refused the Amin catheter placement. 75 yo F with PMH of cervical cancer s/p radiation therapy (20 years ago), breast cancer (ER+ / [place]+) s/p lumpectomy + radiation thereby (6 years ago), uterine cancer  s/p chemotherapy ( 3 years ago) as per patient, metastasis to bones, COPD not on home oxygen, DM, HTN, osteoporosis, DVT on eliquis, Anemia of chronic disease, CKD 3 presents to ED with gradual onset, episodic, 7/10 RLQ abdominal pain radiating to groin for past 2 days, with no aggravating or relieving factors. Pt reports 100lb weight loss in past 1 year due to malignant cancer. Pt also reports persistent non bloody, non mucoid, watery diarrhea for past 3 weeks, 4-5 episodes per day, not associated with food intake. In the ED, vitals were WNL,  Pt received 1L fluid bolus, CT A/P showed severe right hydronephrosis and dilatation of R ureter suggesting obstruction  due to encasement of right ureter. Urology was consulted, recommended no acute intervention at this time. Amin drained feces concerning for colovescical fistula. Surgery discussed with the patient the possibility of diversion ostomy. Palliative care spoke to the patient about her overall prognosis and to help her decide about surgery. Patient was agreeable to surgery but was unable to tolerate the colonoscopy prep x2 so procedure was not done. Of note patient had leukocytosis secondary to a likely pylnonephritis. Patient is being discharged on Vantin for 12 days. 75 yo F with PMH of cervical cancer s/p radiation therapy (20 years ago), breast cancer (ER+ / [place]+) s/p lumpectomy + radiation thereby (6 years ago), uterine cancer  s/p chemotherapy ( 3 years ago) as per patient, metastasis to bones, COPD not on home oxygen, DM, HTN, osteoporosis, DVT on eliquis, Anemia of chronic disease, CKD 3 presents to ED with gradual onset, episodic, 7/10 RLQ abdominal pain radiating to groin for past 2 days, with no aggravating or relieving factors. Pt reports 100lb weight loss in past 1 year due to malignant cancer. Pt also reports persistent non bloody, non mucoid, watery diarrhea for past 3 weeks, 4-5 episodes per day, not associated with food intake. In the ED, vitals were WNL,  Pt received 1L fluid bolus, CT A/P showed severe right hydronephrosis and dilatation of R ureter suggesting obstruction  due to encasement of right ureter. Urology was consulted, recommended no acute intervention at this time. Amin drained feces concerning for colovescical fistula. Surgery discussed with the patient the possibility of diversion ostomy. Palliative care spoke to the patient about her overall prognosis and to help her decide about surgery. Patient was agreeable to surgery but was unable to tolerate the colonoscopy prep x2 so procedure was not done. Of note patient had leukocytosis secondary to a likely pylnonephritis. Goals of care discussion had with patient and health care proxy [place]. Decision was made to hold all antibiotics, vital checks, and labs. Patient is DNR/DNI and is being discharged to hospice.

## 2020-07-22 NOTE — CONSULT NOTE ADULT - CONSULT REASON
Hydronephrosis
Hydronephrosis, pelvic mass
Pre-op cardiac risk stratification and optimization
colonoscopy as patient has necrotic mass in pelvis concerning for colovesical fistula for which Surgery may do a diverting ileostomy
colovesical fistula
necrotic abdominal mass
pelvic mass
severe R hydronephrosis 2/2 mass
GOC

## 2020-07-22 NOTE — PROVIDER CONTACT NOTE (OTHER) - ASSESSMENT
Pt /53 HR 66. O2 sat 93%
Pt /56  upon arrival to the unit. No s/s of distress.
Pt is NPO at this time and scheduled to receive morning medications.
pt /50 . No s/s of distress.
Pt is stable. Resting comfortably. Pt denies any distress.

## 2020-07-22 NOTE — PROGRESS NOTE ADULT - SUBJECTIVE AND OBJECTIVE BOX
SUBJECTIVE:    Patient is a 74y old Female who presents with a chief complaint of abdominal pain (22 Jul 2020 08:08)    Currently admitted to medicine with the primary diagnosis of Hydronephrosis     Today is hospital day 11d. This morning she is resting comfortably in bed and reports no new issues or overnight events.     PAST MEDICAL & SURGICAL HISTORY  Osteoporosis  Obesity: bmi 41  COPD (chronic obstructive pulmonary disease)  Asthma: last attack 1/2018- hospitalized&amp; steroids  Diabetes  Hypertension  Breast cancer: left, radiation ~2 yrs ago  Cervical cancer: radiation&amp; chemo ~15 yrs ago  History of appendectomy  History of lumpectomy of left breast    SOCIAL HISTORY:  Negative for smoking/alcohol/drug use.     ALLERGIES:  iodinated radiocontrast agents (Other)  penicillin (Unknown)    MEDICATIONS:  STANDING MEDICATIONS  apixaban 5 milliGRAM(s) Oral every 12 hours  cefepime   IVPB 2000 milliGRAM(s) IV Intermittent every 12 hours  cefepime   IVPB      chlorhexidine 4% Liquid 1 Application(s) Topical <User Schedule>  lactated ringers. 1000 milliLiter(s) IV Continuous <Continuous>  latanoprost 0.005% Ophthalmic Solution 1 Drop(s) Both EYES at bedtime  montelukast 10 milliGRAM(s) Oral daily  saccharomyces boulardii 250 milliGRAM(s) Oral two times a day  simvastatin 20 milliGRAM(s) Oral at bedtime  tamoxifen 20 milliGRAM(s) Oral daily    PRN MEDICATIONS  ALBUTerol    90 MICROgram(s) HFA Inhaler 2 Puff(s) Inhalation every 6 hours PRN  diphenhydrAMINE 25 milliGRAM(s) Oral every 4 hours PRN  ondansetron Injectable 4 milliGRAM(s) IV Push every 6 hours PRN    VITALS:   T(F): 97.3  HR: 116  BP: 102/56  RR: 19  SpO2: --    LABS:                        7.1    13.87 )-----------( 433      ( 22 Jul 2020 00:38 )             22.3     07-22    139  |  105  |  23<H>  ----------------------------<  72  3.2<L>   |  15<L>  |  2.3<H>    Ca    7.9<L>      22 Jul 2020 00:38  Mg     1.9     07-22    TPro  5.3<L>  /  Alb  2.0<L>  /  TBili  0.3  /  DBili  x   /  AST  7   /  ALT  <5  /  AlkPhos  94  07-22                  RADIOLOGY:    PHYSICAL EXAM:  GEN: No acute distress  LUNGS: Clear to auscultation bilaterally   HEART: Regular  ABD: Soft, non-tender, non-distended.  EXT: NC/NC/NE/2+PP/LUND/Skin Intact.   NEURO: AAOX3    Intravenous access:   NG tube:   Amin Catheter:   Indwelling Urethral Catheter:     Connect To:  Straight Drainage/[place]    Indication:  Urologic Procedure,  Surgery (07-13-20 @ 04:58) (not performed) [active]  Indwelling Urethral Catheter:     Connect To:  Straight Drainage/[place]    Indication:  Urinary Retention / Obstruction (07-18-20 @ 00:53) (not performed) [active]  Indwelling Urethral Catheter:     Connect To:  Straight Drainage/[place]    Indication:  Urine Output Monitoring in Critically Ill (07-18-20 @ 11:03) (not performed) [active]  Indwelling Urethral Catheter:     Connect To:  Straight Drainage/[place]    Indication:  Urinary Retention / Obstruction (07-21-20 @ 09:00) (not performed) [active] SUBJECTIVE:    Patient is a 74y old Female who presents with a chief complaint of abdominal pain (22 Jul 2020 08:08)    Currently admitted to medicine with the primary diagnosis of Hydronephrosis     Today is hospital day 11d. This morning she is resting comfortably in bed and reports no new issues or overnight events. She has not been eating and drinking recently. Denies any abdominal pain.    PAST MEDICAL & SURGICAL HISTORY  Osteoporosis  Obesity: bmi 41  COPD (chronic obstructive pulmonary disease)  Asthma: last attack 1/2018- hospitalized&amp; steroids  Diabetes  Hypertension  Breast cancer: left, radiation ~2 yrs ago  Cervical cancer: radiation&amp; chemo ~15 yrs ago  History of appendectomy  History of lumpectomy of left breast    SOCIAL HISTORY:  Negative for smoking/alcohol/drug use.     ALLERGIES:  iodinated radiocontrast agents (Other)  penicillin (Unknown)    MEDICATIONS:  STANDING MEDICATIONS  apixaban 5 milliGRAM(s) Oral every 12 hours  cefepime   IVPB 2000 milliGRAM(s) IV Intermittent every 12 hours  cefepime   IVPB      chlorhexidine 4% Liquid 1 Application(s) Topical <User Schedule>  lactated ringers. 1000 milliLiter(s) IV Continuous <Continuous>  latanoprost 0.005% Ophthalmic Solution 1 Drop(s) Both EYES at bedtime  montelukast 10 milliGRAM(s) Oral daily  saccharomyces boulardii 250 milliGRAM(s) Oral two times a day  simvastatin 20 milliGRAM(s) Oral at bedtime  tamoxifen 20 milliGRAM(s) Oral daily    PRN MEDICATIONS  ALBUTerol    90 MICROgram(s) HFA Inhaler 2 Puff(s) Inhalation every 6 hours PRN  diphenhydrAMINE 25 milliGRAM(s) Oral every 4 hours PRN  ondansetron Injectable 4 milliGRAM(s) IV Push every 6 hours PRN    VITALS:   T(F): 97.3  HR: 116  BP: 102/56  RR: 19  SpO2: --    LABS:                        7.1    13.87 )-----------( 433      ( 22 Jul 2020 00:38 )             22.3     07-22    139  |  105  |  23<H>  ----------------------------<  72  3.2<L>   |  15<L>  |  2.3<H>    Ca    7.9<L>      22 Jul 2020 00:38  Mg     1.9     07-22    TPro  5.3<L>  /  Alb  2.0<L>  /  TBili  0.3  /  DBili  x   /  AST  7   /  ALT  <5  /  AlkPhos  94  07-22                  RADIOLOGY:    PHYSICAL EXAM:  GEN: No acute distress  LUNGS: Clear to auscultation bilaterally   HEART: Regular  ABD: Soft, non-tender, non-distended.  EXT: NC/NC/NE/2+PP/LUND/Skin Intact.   NEURO: AAOX3    Intravenous access:   NG tube:   Amin Catheter:   Indwelling Urethral Catheter:     Connect To:  Straight Drainage/[place]    Indication:  Urologic Procedure,  Surgery (07-13-20 @ 04:58) (not performed) [active]  Indwelling Urethral Catheter:     Connect To:  Straight Drainage/[place]    Indication:  Urinary Retention / Obstruction (07-18-20 @ 00:53) (not performed) [active]  Indwelling Urethral Catheter:     Connect To:  Straight Drainage/Gravity    Indication:  Urine Output Monitoring in Critically Ill (07-18-20 @ 11:03) (not performed) [active]  Indwelling Urethral Catheter:     Connect To:  Straight Drainage/[place]    Indication:  Urinary Retention / Obstruction (07-21-20 @ 09:00) (not performed) [active] SUBJECTIVE:    Patient is a 74y old Female who presents with a chief complaint of abdominal pain (22 Jul 2020 08:08)    Currently admitted to medicine with the primary diagnosis of Hydronephrosis     Today is hospital day 11d. This morning she is resting comfortably in bed and reports no new issues or overnight events. She has not been eating and drinking recently. Denies any abdominal pain.    PAST MEDICAL & SURGICAL HISTORY  Osteoporosis  Obesity: bmi 41  COPD (chronic obstructive pulmonary disease)  Asthma: last attack 1/2018- hospitalized&amp; steroids  Diabetes  Hypertension  Breast cancer: left, radiation ~2 yrs ago  Cervical cancer: radiation&amp; chemo ~15 yrs ago  History of appendectomy  History of lumpectomy of left breast    SOCIAL HISTORY:  Negative for smoking/alcohol/drug use.     ALLERGIES:  iodinated radiocontrast agents (Other)  penicillin (Unknown)    MEDICATIONS:  STANDING MEDICATIONS  apixaban 5 milliGRAM(s) Oral every 12 hours  cefepime   IVPB 2000 milliGRAM(s) IV Intermittent every 12 hours  cefepime   IVPB      chlorhexidine 4% Liquid 1 Application(s) Topical <User Schedule>  lactated ringers. 1000 milliLiter(s) IV Continuous <Continuous>  latanoprost 0.005% Ophthalmic Solution 1 Drop(s) Both EYES at bedtime  montelukast 10 milliGRAM(s) Oral daily  saccharomyces boulardii 250 milliGRAM(s) Oral two times a day  simvastatin 20 milliGRAM(s) Oral at bedtime  tamoxifen 20 milliGRAM(s) Oral daily    PRN MEDICATIONS  ALBUTerol    90 MICROgram(s) HFA Inhaler 2 Puff(s) Inhalation every 6 hours PRN  diphenhydrAMINE 25 milliGRAM(s) Oral every 4 hours PRN  ondansetron Injectable 4 milliGRAM(s) IV Push every 6 hours PRN    VITALS:   T(F): 97.3  HR: 116  BP: 102/56  RR: 19    LABS:                        7.1    13.87 )-----------( 433      ( 22 Jul 2020 00:38 )             22.3     07-22    139  |  105  |  23<H>  ----------------------------<  72  3.2<L>   |  15<L>  |  2.3<H>    Ca    7.9<L>      22 Jul 2020 00:38  Mg     1.9     07-22    TPro  5.3<L>  /  Alb  2.0<L>  /  TBili  0.3  /  DBili  x   /  AST  7   /  ALT  <5  /  AlkPhos  94  07-22        RADIOLOGY:    PHYSICAL EXAM:  GEN: No acute distress  LUNGS: Clear to auscultation bilaterally   HEART: Regular  ABD: Soft, non-tender, non-distended.  EXT: NC/NC/NE/2+PP/LUND/Skin Intact.   NEURO: AAOX3    Intravenous access:   NG tube:   Amin Catheter:   Indwelling Urethral Catheter:     Connect To:  Straight Drainage/[place]    Indication:  Urologic Procedure,  Surgery (07-13-20 @ 04:58) (not performed) [active]  Indwelling Urethral Catheter:     Connect To:  Straight Drainage/[place]    Indication:  Urinary Retention / Obstruction (07-18-20 @ 00:53) (not performed) [active]  Indwelling Urethral Catheter:     Connect To:  Straight Drainage/[place]    Indication:  Urine Output Monitoring in Critically Ill (07-18-20 @ 11:03) (not performed) [active]  Indwelling Urethral Catheter:     Connect To:  Straight Drainage/[place]    Indication:  Urinary Retention / Obstruction (07-21-20 @ 09:00) (not performed) [active]

## 2020-07-22 NOTE — DISCHARGE NOTE PROVIDER - NSDCCPCAREPLAN_GEN_ALL_CORE_FT
PRINCIPAL DISCHARGE DIAGNOSIS  Diagnosis: Metastatic cancer to bone  Assessment and Plan of Treatment: You have metastatic uterine cancer to the bone and a history of breast and cervical cancer. On this admission you were found to have a colovesical fistula and hydronephrosis. The decision was made in concert with your health care proxy to stop all treament and go to hospice. You are DNR/DNI

## 2020-07-22 NOTE — CONSULT NOTE ADULT - SUBJECTIVE AND OBJECTIVE BOX
INTERVENTIONAL RADIOLOGY CONSULT:     Procedure Requested: Percutaneous nephrostomy    HPI:  73 yo F with PMH of cervical cancer s/p radiation therapy (20 years ago), breast cancer (ER+ / [place]+) s/p lumpectomy + radiation thereby (6 years ago), uterine cancer  s/p chemotherapy ( 3 years ago) as per patient, metastasis to bones,  COPD not on home oxygen, DM, HTN, osteoporosis, DVT on eliquis, Anemia of chronic disease, CKD 3 presents to ED with R sided abdominal pain and diarrhea.   Pt reports gradual onset, episodic, 7/10 RLQ abdominal pain radiating to groin for past 2 days, with no aggravating or relieving factors. Pt reports 100lb weight loss in past 1 year due to malignant cancer.   Pt also reports persistent non bloody, non mucoid, watery diarrhea for past 3 weeks, 4-5 episodes per day, not associated with food intake, and no recent travel or sick contact history. Pt has not passed any stool today.   Denies any fever, cough, chest pain, SOB, n/v, recent travel or sick contacts.     In the ED, vitals were WNL,  Pt received 1L fluid bolus, CT A/P showed severe right hydronephrosis and dilatation of R ureter suggesting obstruction  due to encasement of right ureter. Urology was consulted, recommended no acute intervention at this time, pt refused the Amin catheter placement. Pt will be admitted under medicine for further work up. (12 Jul 2020 01:03)      PAST MEDICAL & SURGICAL HISTORY:  Osteoporosis  Obesity: bmi 41  COPD (chronic obstructive pulmonary disease)  Asthma: last attack 1/2018- hospitalized&amp; steroids  Diabetes  Hypertension  Breast cancer: left, radiation ~2 yrs ago  Cervical cancer: radiation&amp; chemo ~15 yrs ago  History of appendectomy  History of lumpectomy of left breast      MEDICATIONS  (STANDING):  apixaban 5 milliGRAM(s) Oral every 12 hours  chlorhexidine 4% Liquid 1 Application(s) Topical <User Schedule>  lactated ringers. 1000 milliLiter(s) (100 mL/Hr) IV Continuous <Continuous>  latanoprost 0.005% Ophthalmic Solution 1 Drop(s) Both EYES at bedtime  montelukast 10 milliGRAM(s) Oral daily  saccharomyces boulardii 250 milliGRAM(s) Oral two times a day    MEDICATIONS  (PRN):  ALBUTerol    90 MICROgram(s) HFA Inhaler 2 Puff(s) Inhalation every 6 hours PRN Bronchospasm  diphenhydrAMINE 25 milliGRAM(s) Oral every 4 hours PRN Rash and/or Itching  ondansetron Injectable 4 milliGRAM(s) IV Push every 6 hours PRN Nausea and/or Vomiting      Allergies    iodinated radiocontrast agents (Other)  penicillin (Unknown)    Intolerances    FAMILY HISTORY:  Family history of breast cancer (Mother, Sibling): mother&amp; sister      Physical Exam:   Vital Signs Last 24 Hrs  T(C): 36.1 (22 Jul 2020 15:48), Max: 36.5 (22 Jul 2020 00:02)  T(F): 96.9 (22 Jul 2020 15:48), Max: 97.7 (22 Jul 2020 00:02)  HR: 115 (22 Jul 2020 15:48) (111 - 121)  BP: 106/58 (22 Jul 2020 15:48) (83/56 - 106/58)  BP(mean): --  RR: 18 (22 Jul 2020 15:48) (18 - 19)  SpO2: --      Labs:                         7.1    13.87 )-----------( 433      ( 22 Jul 2020 00:38 )             22.3     07-22    139  |  105  |  23<H>  ----------------------------<  72  3.2<L>   |  15<L>  |  2.3<H>    Ca    7.9<L>      22 Jul 2020 00:38  Mg     1.9     07-22    TPro  5.3<L>  /  Alb  2.0<L>  /  TBili  0.3  /  DBili  x   /  AST  7   /  ALT  <5  /  AlkPhos  94  07-22        Pertinent labs:                      7.1    13.87 )-----------( 433      ( 22 Jul 2020 00:38 )             22.3       07-22    139  |  105  |  23<H>  ----------------------------<  72  3.2<L>   |  15<L>  |  2.3<H>    Ca    7.9<L>      22 Jul 2020 00:38  Mg     1.9     07-22    TPro  5.3<L>  /  Alb  2.0<L>  /  TBili  0.3  /  DBili  x   /  AST  7   /  ALT  <5  /  AlkPhos  94  07-22          Radiology & Additional Studies:     Radiology imaging reviewed.       ASSESSMENT AND PLAN:  75F with PMHx cervical cancer admitted with abdominal pain, CT demonstrating necrosis of a pelvic mass and encasement of the right ureter with hydronephrosis. No acute renopathy until today with Cr 2.3 from 1.2. Tachycardic normotensive. Hgb 7.1. Continues on Eliquis.   - Hold Eliquis for six doses per SIR guidelines given procedural high risk for significant hemorrhage.   - Correct anemia to hgb >8.0.   - Trend renal function, UOP.   - Percutaneous nephrostomy once optimized as indicated.    Thank you for the courtesy of this consult, please call [phone] with any further questions.

## 2020-07-22 NOTE — GOALS OF CARE CONVERSATION - ADVANCED CARE PLANNING - TREATMENT GUIDELINES
Do not re-hospitalize/DNR Order/No blood draws/No artificial nutrition/No antibiotics/IV fluid trial/Comfort measures only

## 2020-07-22 NOTE — PROGRESS NOTE ADULT - ASSESSMENT
75 yo F with PMH of cervical cancer s/p radiation therapy (20 years ago), breast cancer (ER+ / [place]+) s/p lumpectomy + radiation thereby (6 years ago), uterine cancer s/p chemotherapy (3 years ago) as per patient, 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     #RLQ/suprapubic pain 2/2 obstructive pelvic mass  #Right sided hydronephrosis 2/2 pelvic mass with necrosis   #Colovesical fistula  #DESTIN  Creatinine 2.3 up from 1.2 yesterday  Necrotic pelvic mass seen on imaging from [facility] with possible evidence of fistulization to the cecum (records in chart)   Seen by IR no perc nephrostomy recommended   No colonoscopy as patient can not tolerate  Not eating or drinking  -catherine in place but patient is still urinating on herself   -Will reconsult for possible nephrostomy placement now that patient is in DESTIN  ##############  #leukocytosis, possible pyelonephritis  possibly 2/2 necrotic pelvic mass vs pyelo  On cefepime 2g q12h  -f/u ID recs    #chronic anemia likely 2/2 iron deficiency   cont iron supplementation  trend cbc transfuse if hgb <7  maintain active type and screen     #hx of right LE DVT    - holding Eliquis, bridging with Lovenox, restart Eliquis     Code: Full, Palliative following 73 yo F with PMH of cervical cancer s/p radiation therapy (20 years ago), breast cancer (ER+ / [place]+) s/p lumpectomy + radiation thereby (6 years ago), uterine cancer s/p chemotherapy (3 years ago) as per patient, 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     #RLQ/suprapubic pain 2/2 obstructive pelvic mass  #Right sided hydronephrosis 2/2 pelvic mass with necrosis   #Colovesical fistula  #DESTIN  Creatinine 2.3 up from 1.2 yesterday  Necrotic pelvic mass seen on imaging from [facility] with possible evidence of fistulization to the cecum (records in chart)   Seen by IR no perc nephrostomy recommended   No colonoscopy as patient can not tolerate  Not eating or drinking  -catherine in place but patient is still urinating on herself   -Will reconsult for possible nephrostomy placement now that patient is in DESTIN  ##############  #leukocytosis, possible pyelonephritis  possibly 2/2 necrotic pelvic mass vs pyelo  Now 13.87 up from 11.09  -c/w cefepime 2g q12h  -Will be discharged on Vantin PO as per ID    #chronic anemia likely 2/2 iron deficiency   cont iron supplementation  trend cbc transfuse if hgb <7  maintain active type and screen   7.1 today  -f/u repeat CBC    #hx of right LE DVT    - c/w Eliquis     Code: Full, Palliative following 73 yo F with PMH of cervical cancer s/p radiation therapy (20 years ago), breast cancer (ER+ / [place]+) s/p lumpectomy + radiation thereby (6 years ago), uterine cancer s/p chemotherapy (3 years ago) as per patient, 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     #RLQ/suprapubic pain 2/2 obstructive pelvic mass  #Right sided hydronephrosis 2/2 pelvic mass with necrosis   #Colovesical fistula  #DESTIN  Creatinine 2.3 up from 1.2 yesterday  Necrotic pelvic mass seen on imaging from [facility] with possible evidence of fistulization to the cecum (records in chart)   Seen by IR no perc nephrostomy recommended   No colonoscopy as patient can not tolerate  Not eating or drinking  -catherine in place but patient is still urinating on herself   -Will reconsult IR for possible nephrostomy placement now that patient is in DESTIN  -f/u kidney bladder US    #leukocytosis, possible pyelonephritis  possibly 2/2 necrotic pelvic mass vs pyelo  Now 13.87 up from 11.09  -c/w cefepime 2g q12h  -Will be discharged on Vantin PO as per ID  -f/u repeat blood cultures    #chronic anemia likely 2/2 iron deficiency   cont iron supplementation  trend cbc transfuse if hgb <7  maintain active type and screen   7.1 today  -Will get 1 unit after type and screen results  -f/u repeat CBC    #Hypokalemia  K 3.2 today  -Repleted with 20 IV K    #hx of right LE DVT    - c/w Eliquis     Code: Full, Palliative following 73 yo F with PMH of cervical cancer s/p radiation therapy (20 years ago), breast cancer (ER+ / [place]+) s/p lumpectomy + radiation thereby (6 years ago), uterine cancer s/p chemotherapy (3 years ago) as per patient, 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     #RLQ/suprapubic pain 2/2 obstructive pelvic mass  #Right sided hydronephrosis 2/2 pelvic mass with necrosis   #Colovesical fistula  #DESTIN  Creatinine 2.3 up from 1.2 yesterday  Necrotic pelvic mass seen on imaging from [facility] with possible evidence of fistulization to the cecum (records in chart)   Seen by IR no perc nephrostomy recommended   No colonoscopy as patient can not tolerate  Not eating or drinking  -catherine in place but patient is still urinating on herself   -Will reconsult IR for possible nephrostomy placement now that patient is in DESTIN  -f/u kidney bladder US    #leukocytosis, possible pyelonephritis  possibly 2/2 necrotic pelvic mass vs pyelo  Now 13.87 up from 11.09  -c/w cefepime 2g q12h  -Will be discharged on Vantin PO as per ID  -f/u repeat blood cultures    #chronic anemia likely 2/2 iron deficiency   cont iron supplementation  trend cbc transfuse if hgb <7  maintain active type and screen   7.1 today  -Will get 1 unit after type and screen results  -f/u repeat CBC    #Hypokalemia  K 3.2 today  -Repleted with 20 IV K    #hx of right LE DVT    - c/w Eliquis     Code: Full, Palliative following     Attending Attestation:    Pt has been seen and examined. Case and Plan discussed at rounds and with HCP in detail at bedside.   HCP is Temitope Green and her contact is [phone]   HCP Forms copies placed in chart   MOLST Form placed in chart  Will get Hospice Consult at this time  Overall poor prognosis   DNR/DNI     Acute Diagnosies:   Colovescicular Fistula / Right Hydronephrosis / DESTIN / Worsening Leukocytosis / Polymicrobial Infection - Mirabilis, Morganella, E.Coli / Anemia   Metabolic Encephalopathy 2/2 Polymicrobial Infection and DESTIN and drastic clinical decline  Possible Metastatic CA  Failure To Thrive   Guarded Prognosis     Eval noted  Bladder Catherine Placed and will remain in place given fistula and palliatively   c/w all care per orders  No aggressive Measures  Comfort Care  Hospice Consult  Discussed with Palliative Attending and CM    Dispo: D/C Planning for Facility Hospice 75 yo F with PMH of cervical cancer s/p radiation therapy (20 years ago), breast cancer (ER+ / [place]+) s/p lumpectomy + radiation thereby (6 years ago), uterine cancer s/p chemotherapy (3 years ago) as per patient, 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     #RLQ/suprapubic pain 2/2 obstructive pelvic mass  #Right sided hydronephrosis 2/2 pelvic mass with necrosis   #Colovesical fistula  #DESTIN  Creatinine 2.3 up from 1.2 yesterday  Necrotic pelvic mass seen on imaging from [facility] with possible evidence of fistulization to the cecum (records in chart)   Seen by IR no perc nephrostomy recommended   No colonoscopy as patient can not tolerate  Not eating or drinking  -catherine in place but patient is still urinating on herself   -Will reconsult IR for possible nephrostomy placement now that patient is in DESTIN  -f/u kidney bladder US    #leukocytosis, possible pyelonephritis  possibly 2/2 necrotic pelvic mass vs pyelo  Now 13.87 up from 11.09  -c/w cefepime 2g q12h  -Will be discharged on Vantin PO as per ID  -f/u repeat blood cultures    #chronic anemia likely 2/2 iron deficiency   cont iron supplementation  trend cbc transfuse if hgb <7  maintain active type and screen   7.1 today  -Will get 1 unit after type and screen results  -f/u repeat CBC    #Hypokalemia  K 3.2 today  -Repleted with 20 IV K    #hx of right LE DVT    - c/w Eliquis     Code: Full, Palliative following     Attending Attestation:    Pt has been seen and examined. Case and Plan discussed at rounds and with HCP in detail at bedside.   HCP is Temitope Green and her contact is [phone]   HCP Forms copies placed in chart   MOLST Form placed in chart  Will get Hospice Consult at this time  Overall poor prognosis   DNR/DNI     Acute Diagnosies:   Colovescicular Fistula / Right Hydronephrosis / DESTIN / Worsening Leukocytosis / Polymicrobial Infection - Mirabilis, Morganella, E.Coli / Anemia   Metabolic Encephalopathy 2/2 Polymicrobial Infection and DESTIN and drastic clinical decline  Possible Metastatic CA  Failure To Thrive   Correct HypoKalemia w/ IV KCL and f/u Level   Decision made to transfuse 1 PRBC this morning before Hospice decision by HCP   Guarded Prognosis     Eval noted  Bladder Catherine Placed and will remain in place given fistula and palliatively   c/w all care per orders  No aggressive Measures  Comfort Care  Hospice Consult  Discussed with Palliative Attending and CM    Dispo: D/C Planning for Facility Hospice

## 2020-07-22 NOTE — DISCHARGE NOTE PROVIDER - NSDCMRMEDTOKEN_GEN_ALL_CORE_FT
Advair Diskus 100 mcg-50 mcg inhalation powder: 1 puff(s) inhaled 2 times a day  albuterol 90 mcg/inh inhalation aerosol: 2 puff(s) inhaled 4 times a day, As Needed  alendronate weekly: 70 milligram(s) orally once a week (wednesday)  aspirin 81 mg oral tablet: 1 tab(s) orally once a day  Eliquis 5 mg oral tablet: 1 tab(s) orally 2 times a day  enalapril 10 mg oral tablet: 1 tab(s) orally once a day  latanoprost 0.005% ophthalmic solution: 1 drop(s) to each affected eye once a day (in the evening)  megestrol 20 mg oral tablet: 1 tab(s) orally 4 times a day  metoprolol succinate 25 mg oral tablet, extended release: 1 tab(s) orally once a day  montelukast 10 mg oral tablet: 1 tab(s) orally once a day  simvastatin 20 mg oral tablet: 1 tab(s) orally once a day (at bedtime)  tamoxifen 20 mg oral tablet: 1 tab(s) orally once a day Advair Diskus 100 mcg-50 mcg inhalation powder: 1 puff(s) inhaled 2 times a day  albuterol 90 mcg/inh inhalation aerosol: 2 puff(s) inhaled every 6 hours, As needed, Bronchospasm  alendronate weekly: 70 milligram(s) orally once a week (wednesday)  apixaban 5 mg oral tablet: 1 tab(s) orally every 12 hours  aspirin 81 mg oral tablet: 1 tab(s) orally once a day  enalapril 10 mg oral tablet: 1 tab(s) orally once a day  latanoprost 0.005% ophthalmic solution: 1 drop(s) to each affected eye once a day (in the evening)  megestrol 20 mg oral tablet: 1 tab(s) orally 4 times a day  metoprolol succinate 25 mg oral tablet, extended release: 1 tab(s) orally once a day  montelukast 10 mg oral tablet: 1 tab(s) orally once a day  simvastatin 20 mg oral tablet: 1 tab(s) orally once a day (at bedtime)  tamoxifen 20 mg oral tablet: 1 tab(s) orally once a day Advair Diskus 100 mcg-50 mcg inhalation powder: 1 puff(s) inhaled 2 times a day  albuterol 90 mcg/inh inhalation aerosol: 2 puff(s) inhaled every 6 hours, As needed, Bronchospasm  alendronate weekly: 70 milligram(s) orally once a week (wednesday)  apixaban 5 mg oral tablet: 1 tab(s) orally every 12 hours  aspirin 81 mg oral tablet: 1 tab(s) orally once a day  enalapril 10 mg oral tablet: 1 tab(s) orally once a day  latanoprost 0.005% ophthalmic solution: 1 drop(s) to each affected eye once a day (at bedtime)  megestrol 20 mg oral tablet: 1 tab(s) orally 4 times a day  metoprolol succinate 25 mg oral tablet, extended release: 1 tab(s) orally once a day  montelukast 10 mg oral tablet: 1 tab(s) orally once a day  simvastatin 20 mg oral tablet: 1 tab(s) orally once a day (at bedtime)  tamoxifen 20 mg oral tablet: 1 tab(s) orally once a day morphine: 2 milligram(s) intravenous every 6 hours, As Needed

## 2020-07-22 NOTE — CHART NOTE - NSCHARTNOTEFT_GEN_A_CORE
Spoke to JUAN DIEGO Buckley from Urology. He says that catherine is in the vagina and is safe to remove by nursing

## 2020-07-23 NOTE — PROGRESS NOTE ADULT - SUBJECTIVE AND OBJECTIVE BOX
SUBJECTIVE:    Patient is a 74y old Female who presents with a chief complaint of abdominal pain (22 Jul 2020 17:19)    Currently admitted to medicine with the primary diagnosis of Hydronephrosis     Today is hospital day 12d. This morning she is resting comfortably in bed and reports no new issues or overnight events. Patient appears more alert than yesterday.    PAST MEDICAL & SURGICAL HISTORY  Osteoporosis  Obesity: bmi 41  COPD (chronic obstructive pulmonary disease)  Asthma: last attack 1/2018- hospitalized&amp; steroids  Diabetes  Hypertension  Breast cancer: left, radiation ~2 yrs ago  Cervical cancer: radiation&amp; chemo ~15 yrs ago  History of appendectomy  History of lumpectomy of left breast    SOCIAL HISTORY:  Negative for smoking/alcohol/drug use.     ALLERGIES:  iodinated radiocontrast agents (Other)  penicillin (Unknown)    MEDICATIONS:  STANDING MEDICATIONS  apixaban 5 milliGRAM(s) Oral every 12 hours  chlorhexidine 4% Liquid 1 Application(s) Topical <User Schedule>  lactated ringers. 1000 milliLiter(s) IV Continuous <Continuous>  latanoprost 0.005% Ophthalmic Solution 1 Drop(s) Both EYES at bedtime  montelukast 10 milliGRAM(s) Oral daily  saccharomyces boulardii 250 milliGRAM(s) Oral two times a day    PRN MEDICATIONS  ALBUTerol    90 MICROgram(s) HFA Inhaler 2 Puff(s) Inhalation every 6 hours PRN  diphenhydrAMINE 25 milliGRAM(s) Oral every 4 hours PRN  LORazepam   Injectable 0.5 milliGRAM(s) IV Push every 8 hours PRN  morphine  - Injectable 2 milliGRAM(s) IV Push every 6 hours PRN  ondansetron Injectable 4 milliGRAM(s) IV Push every 6 hours PRN    VITALS:   T(F): 96.9  HR: 115  BP: 106/58  RR: 18  SpO2: --    LABS:                        7.1    13.87 )-----------( 433      ( 22 Jul 2020 00:38 )             22.3     07-22    139  |  105  |  23<H>  ----------------------------<  72  3.2<L>   |  15<L>  |  2.3<H>    Ca    7.9<L>      22 Jul 2020 00:38  Mg     1.9     07-22    TPro  5.3<L>  /  Alb  2.0<L>  /  TBili  0.3  /  DBili  x   /  AST  7   /  ALT  <5  /  AlkPhos  94  07-22                  RADIOLOGY:    PHYSICAL EXAM:  GEN: No acute distress  LUNGS: Clear to auscultation bilaterally   HEART: Regular  ABD: Soft, non-tender, non-distended.  EXT: NC/NC/NE/2+PP/LUND/Skin Intact.   NEURO: AAOX3    Intravenous access:   NG tube:   Amin Catheter:   Indwelling Urethral Catheter:     Connect To:  Straight Drainage/Botkins    Indication:  Urologic Procedure,  Surgery (07-13-20 @ 04:58) (not performed) [active]  Indwelling Urethral Catheter:     Connect To:  Straight Drainage/Botkins    Indication:  Urinary Retention / Obstruction (07-18-20 @ 00:53) (not performed) [active]  Indwelling Urethral Catheter:     Connect To:  Straight Drainage/Gravity    Indication:  Urine Output Monitoring in Critically Ill (07-18-20 @ 11:03) (not performed) [active]  Indwelling Urethral Catheter:     Connect To:  Straight Drainage/Botkins    Indication:  Urinary Retention / Obstruction (07-21-20 @ 09:00) (not performed) [active]

## 2020-07-23 NOTE — PROGRESS NOTE ADULT - SUBJECTIVE AND OBJECTIVE BOX
Patient is a 74y old  Female who presents with a chief complaint of abdominal pain (23 Jul 2020 10:32)      Subjective: The patient lying in bed comfortably , awake, alert , but very weak to have a conversation. Her creatinine increased from 1.2 to 2.3 yesterday , bladder sono showed  the catherine in the vaginal vault      Vital Signs Last 24 Hrs  T(C): 36.1 (22 Jul 2020 15:48), Max: 36.2 (22 Jul 2020 14:59)  T(F): 96.9 (22 Jul 2020 15:48), Max: 97.2 (22 Jul 2020 14:59)  HR: 115 (22 Jul 2020 15:48) (115 - 119)  BP: 106/58 (22 Jul 2020 15:48) (91/50 - 106/58)  BP(mean): --  RR: 18 (22 Jul 2020 15:48) (18 - 18)  SpO2: --    PHYSICAL EXAM  deferred    MEDICATIONS  (STANDING):  apixaban 5 milliGRAM(s) Oral every 12 hours  chlorhexidine 4% Liquid 1 Application(s) Topical <User Schedule>  lactated ringers. 1000 milliLiter(s) (100 mL/Hr) IV Continuous <Continuous>  latanoprost 0.005% Ophthalmic Solution 1 Drop(s) Both EYES at bedtime  montelukast 10 milliGRAM(s) Oral daily  saccharomyces boulardii 250 milliGRAM(s) Oral two times a day    MEDICATIONS  (PRN):  ALBUTerol    90 MICROgram(s) HFA Inhaler 2 Puff(s) Inhalation every 6 hours PRN Bronchospasm  diphenhydrAMINE 25 milliGRAM(s) Oral every 4 hours PRN Rash and/or Itching  LORazepam   Injectable 0.5 milliGRAM(s) IV Push every 8 hours PRN Anxiety  morphine  - Injectable 2 milliGRAM(s) IV Push every 6 hours PRN Moderate Pain (4 - 6)  ondansetron Injectable 4 milliGRAM(s) IV Push every 6 hours PRN Nausea and/or Vomiting      LABS:                          7.1    13.87 )-----------( 433      ( 22 Jul 2020 00:38 )             22.3         Mean Cell Volume : 78.8 fL  Mean Cell Hemoglobin : 25.1 pg  Mean Cell Hemoglobin Concentration : 31.8 g/dL  Auto Neutrophil # : 12.57 K/uL  Auto Lymphocyte # : 0.57 K/uL  Auto Monocyte # : 0.58 K/uL  Auto Eosinophil # : 0.01 K/uL  Auto Basophil # : 0.05 K/uL  Auto Neutrophil % : 90.6 %  Auto Lymphocyte % : 4.1 %  Auto Monocyte % : 4.2 %  Auto Eosinophil % : 0.1 %  Auto Basophil % : 0.4 %      Serial CBC's  07-22 @ 00:38  Hct-22.3 / Hgb-7.1 / Plat-433 / RBC-2.83 / WBC-13.87      07-22    139  |  105  |  23<H>  ----------------------------<  72  3.2<L>   |  15<L>  |  2.3<H>    Ca    7.9<L>      22 Jul 2020 00:38  Mg     1.9     07-22    TPro  5.3<L>  /  Alb  2.0<L>  /  TBili  0.3  /  DBili  x   /  AST  7   /  ALT  <5  /  AlkPhos  94  07-22 Patient is a 74y old  Female who presents with a chief complaint of abdominal pain (23 Jul 2020 10:32)      Subjective: The patient lying in bed comfortably , awake, alert , but very weak to have a conversation. Her creatinine increased from 1.2 to 2.3 yesterday , bladder sono showed  the catherine in the vaginal vault      Vital Signs Last 24 Hrs  T(C): 36.1 (22 Jul 2020 15:48), Max: 36.2 (22 Jul 2020 14:59)  T(F): 96.9 (22 Jul 2020 15:48), Max: 97.2 (22 Jul 2020 14:59)  HR: 115 (22 Jul 2020 15:48) (115 - 119)  BP: 106/58 (22 Jul 2020 15:48) (91/50 - 106/58)  BP(mean): --  RR: 18 (22 Jul 2020 15:48) (18 - 18)  SpO2: --    PHYSICAL EXAM  GEN:  NAD, lying in bed, grunting to questions  HEENT:  EOMI, no scleral icterus  Lungs: no accessory muscle use, no wheezing  Neuro: opens eyes and makes vocal sounds to questions; no gross neurological deficit      MEDICATIONS  (STANDING):  apixaban 5 milliGRAM(s) Oral every 12 hours  chlorhexidine 4% Liquid 1 Application(s) Topical <User Schedule>  lactated ringers. 1000 milliLiter(s) (100 mL/Hr) IV Continuous <Continuous>  latanoprost 0.005% Ophthalmic Solution 1 Drop(s) Both EYES at bedtime  montelukast 10 milliGRAM(s) Oral daily  saccharomyces boulardii 250 milliGRAM(s) Oral two times a day    MEDICATIONS  (PRN):  ALBUTerol    90 MICROgram(s) HFA Inhaler 2 Puff(s) Inhalation every 6 hours PRN Bronchospasm  diphenhydrAMINE 25 milliGRAM(s) Oral every 4 hours PRN Rash and/or Itching  LORazepam   Injectable 0.5 milliGRAM(s) IV Push every 8 hours PRN Anxiety  morphine  - Injectable 2 milliGRAM(s) IV Push every 6 hours PRN Moderate Pain (4 - 6)  ondansetron Injectable 4 milliGRAM(s) IV Push every 6 hours PRN Nausea and/or Vomiting      LABS:                          7.1    13.87 )-----------( 433      ( 22 Jul 2020 00:38 )             22.3         Mean Cell Volume : 78.8 fL  Mean Cell Hemoglobin : 25.1 pg  Mean Cell Hemoglobin Concentration : 31.8 g/dL  Auto Neutrophil # : 12.57 K/uL  Auto Lymphocyte # : 0.57 K/uL  Auto Monocyte # : 0.58 K/uL  Auto Eosinophil # : 0.01 K/uL  Auto Basophil # : 0.05 K/uL  Auto Neutrophil % : 90.6 %  Auto Lymphocyte % : 4.1 %  Auto Monocyte % : 4.2 %  Auto Eosinophil % : 0.1 %  Auto Basophil % : 0.4 %      Serial CBC's  07-22 @ 00:38  Hct-22.3 / Hgb-7.1 / Plat-433 / RBC-2.83 / WBC-13.87      07-22    139  |  105  |  23<H>  ----------------------------<  72  3.2<L>   |  15<L>  |  2.3<H>    Ca    7.9<L>      22 Jul 2020 00:38  Mg     1.9     07-22    TPro  5.3<L>  /  Alb  2.0<L>  /  TBili  0.3  /  DBili  x   /  AST  7   /  ALT  <5  /  AlkPhos  94  07-22

## 2020-07-23 NOTE — PROGRESS NOTE ADULT - SUBJECTIVE AND OBJECTIVE BOX
INTERVENTIONAL RADIOLOGY PROGRESS NOTE    Chart reviewed. As per recent note, patient is comfort measures only and to discharge on hospice. No intervention to be performed in regards to the persistent right hydronephrosis, renopathy.    Please call Interventional Radiology [phone] with any questions, concerns, or issues regarding above.

## 2020-07-23 NOTE — PROGRESS NOTE ADULT - ASSESSMENT
74 year old woman with history of uterine cancer 20 years ago s/p radiation, history of breast cancer s/p lumpectomy + radiation,  COPD presented with diarrhea and abdominal pain. Hospital course complicated by evidence of hydronephrosis in the setting of likely obstruction, evidence of an obstructing necrotic pelvic mass, and evidence of a colovesical fistula.  Palliative care consulted for GOC.    The patient creatinine increased from 1.2 to 2.3 , her catherine was not draining. A bladder sonogram showed the catherine in vaginal fault . The patient is more weak, lethargic, but awake and alert.  A discussion was held between the hospitalist and the patient's niece Temitope , the patient was made DNR/DNI , and was placed on comfort measures . Hospice team consulted. The patient will go home with hospice, likely tomorrow      Morphine Equivalent Daily Dose (MEDD):  NA    Recommendations:  - DNR/DNI - comfort measures  - Likely home hospice tomorrow    Please Call [phone] PRN 74 year old woman with history of uterine cancer 20 years ago s/p radiation, history of breast cancer s/p lumpectomy + radiation,  COPD presented with diarrhea and abdominal pain. Hospital course complicated by evidence of hydronephrosis in the setting of likely obstruction, evidence of an obstructing necrotic pelvic mass, and evidence of a colovesical fistula.  Palliative care consulted for GOC.    The patient creatinine increased from 1.2 to 2.3 , her catherine was not draining. A bladder sonogram showed the catherine in vaginal fault . The patient is more weak, lethargic, but awake and alert.  A discussion was held between the hospitalist and the patient's niece Temitope , the patient was made DNR/DNI , and was placed on comfort measures. Hospice team consulted. The patient will go home with hospice, likely tomorrow      Morphine Equivalent Daily Dose (MEDD):  NA    Recommendations:  - DNR/DNI - comfort measures  - Likely home hospice tomorrow    Please Call [phone] PRN

## 2020-07-23 NOTE — CHART NOTE - NSCHARTNOTEFT_GEN_A_CORE
Registered Dietitian Limited Note     Per Palliative, the patient was made DNR/DNI, and was placed on comfort measures. Hospice team consulted. The patient will go home with hospice, likely tomorrow. No further nutritional interventions at this time; RD to sign off, recall prn.

## 2020-07-23 NOTE — PROGRESS NOTE ADULT - ASSESSMENT
73 yo F with PMH of cervical cancer s/p radiation therapy (20 years ago), breast cancer (ER+ / UT+) s/p lumpectomy + radiation thereby (6 years ago), uterine cancer s/p chemotherapy (3 years ago) as per patient, 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     #RLQ/suprapubic pain 2/2 obstructive pelvic mass  #Right sided hydronephrosis 2/2 pelvic mass with necrosis   #Colovesical fistula  #DESTIN  #leukocytosis, possible pyelonephritis  #chronic anemia likely 2/2 iron deficiency  -d/c antibiotics  -stop vitals  -c/w IV fluids  - Patient is DNR/DNI  - Comfort care  - Spoke to HCP Temitope Green and patient    - MOLST Form in chart  - Hospice Consult placed. Possible dispo Friday 73 yo F with PMH of cervical cancer s/p radiation therapy (20 years ago), breast cancer (ER+ / AK+) s/p lumpectomy + radiation thereby (6 years ago), uterine cancer s/p chemotherapy (3 years ago) as per patient, 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     #RLQ/suprapubic pain 2/2 obstructive pelvic mass  #Right sided hydronephrosis 2/2 pelvic mass with necrosis   #Colovesical fistula  #DESTIN  #leukocytosis, possible pyelonephritis  #chronic anemia likely 2/2 iron deficiency  -d/c antibiotics  -stop vitals  -c/w IV fluids  - Patient is DNR/DNI  - Comfort care  - Spoke to HCP Temitope Green and patient    - MOLST Form in chart  - Hospice Consult placed. Possible dispo Friday    Attending Attestation    Pt has been seen and examined. Case and Plan discussed at rounds and w/ HCP at bedside and other family members.   I agree with above documentation.  Pt declining further and somnolent - Metabolic Encephalopathy / UTI / Sepsis / Colovescicular Fistula declined Surgical Intervention and HCP wants abx to be stopped since late yesterday and today.   Amin Placed in Bladder by  (replaced)  No artificial feeding or tubes per HCP  DNR/DNI only want Hospice Care and to c/w IVF today    Dispo: Facility Hospice

## 2020-07-23 NOTE — PROGRESS NOTE ADULT - ATTENDING COMMENTS
74F with PMH of cervical cancer s/p radiation therapy (20 years ago), breast cancer s/p lumpectomy/radiation (6 years ago), uterine cancer  s/p chemotherapy ( 3 years ago) with metastatic disease, COPD, DM, HTN, osteoporosis, Rt DVT, Anemia, CKD 3 presents to ED with R sided abdominal pain and diarrhea.  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  No previous colonoscopy.    Patient seen and examined.  Patient denies fecaluria or pneumaturia previously. States she is tolerating a diet and had diarrhea today.    Abdomen - soft, non distended, large pannus, non tender    labs and images reviewed    Plan:  - Given the patient's cancer burden and advanced disease, she is a very poor surgical candidate.  Recommend goals of care discussion.  If patient is amenable, we could offer a palliative diverting loop ileostomy although it would be high risk.  - IV abx  - GI consult for possible colonoscopic evaluation  - GYN Onc/ Med Onc evaluation  - colorectal surgery to follow
Patient seen and examined. Reports diarrhea x 2. No other complaints.   Discontinue IV abx, SIRS due to tumor burden, doubt any active infection.   Holding Eliquis, will bridge with Lovenox.   Rest of plan as above.     #Progress Note Handoff  Pending (specify):  Colonoscopy Monday   Family discussion: socorro pt plan as above   Disposition: TBD
74F with PMH of cervical cancer s/p radiation therapy (20 years ago), breast cancer s/p lumpectomy/radiation (6 years ago), uterine cancer  s/p chemotherapy ( 3 years ago) with metastatic disease, COPD, DM, HTN, osteoporosis, Rt DVT, Anemia, CKD 3 presents to ED with R sided abdominal pain and diarrhea.  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  No previous colonoscopy.    Patient seen and examined.  Patient denies fecaluria or pneumaturia previously. States she is tolerating a diet and had diarrhea today.    Abdomen - soft, non distended, large pannus, non tender  catherine - feculent drainage    labs and images reviewed    Plan:  - Given the patient's cancer burden and advanced disease, she is a very poor surgical candidate.  Recommend goals of care discussion.    - If patient is amenable, we could offer a palliative diverting loop ileostomy although it would be high risk.  - IV abx  - f/u ID  - GI consult for possible colonoscopic evaluation  - GYN Onc/ Med Onc evaluation  - colorectal surgery to follow
74F with PMH of cervical cancer s/p radiation therapy (20 years ago), breast cancer s/p lumpectomy/radiation (6 years ago), uterine cancer  s/p chemotherapy ( 3 years ago) with metastatic disease, COPD, DM, HTN, osteoporosis, Rt DVT, Anemia, CKD 3 presents to ED with R sided abdominal pain and diarrhea.  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  No previous colonoscopy.    Patient seen and examined.  Patient denies fecaluria or pneumaturia previously. States she is tolerating a diet and had diarrhea today.    Abdomen - soft, non distended, large pannus, non tender  catherine - feculent drainage    labs and images reviewed    Plan:  - Given the patient's cancer burden and advanced disease, she is a very poor surgical candidate.  Recommend goals of care discussion.    - If patient is amenable, we could offer a palliative diverting loop ileostomy although it would be high risk.  - IV abx  - f/u ID  - GI consult for possible colonoscopic evaluation  - GYN Onc/ Med Onc evaluation  - colorectal surgery to follow
74F with PMH of cervical cancer s/p radiation therapy (20 years ago), breast cancer s/p lumpectomy/radiation (6 years ago), uterine cancer  s/p chemotherapy ( 3 years ago) with metastatic disease, COPD, DM, HTN, osteoporosis, Rt DVT, Anemia, CKD 3 presents to ED with R sided abdominal pain and diarrhea.  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  No previous colonoscopy.    Patient seen and examined.  Patient denies fecaluria or pneumaturia previously. States she is tolerating a diet.  Reports minimal abdominal pain    Abdomen - soft, non distended, large pannus, non tender  catherine in place with feculent drainage    labs and images reviewed    Plan:  - Given the patient's cancer burden and advanced disease, she is a very poor surgical candidate.  Recommend goals of care discussion.  If patient is amenable, we could offer a palliative diverting loop ileostomy although it would be high risk.  - appreciate palliative care evaluation  - IV abx  - GI consult for possible colonoscopic evaluation  - GYN Onc/ Med Onc evaluation  - colorectal surgery to follow
74F with PMH of cervical cancer s/p radiation therapy (20 years ago), breast cancer s/p lumpectomy/radiation (6 years ago), uterine cancer  s/p chemotherapy ( 3 years ago) with metastatic disease, COPD, DM, HTN, osteoporosis, Rt DVT, Anemia, CKD 3 presents to ED with R sided abdominal pain and diarrhea.  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  No previous colonoscopy.    Patient seen and examined.  Patient denies fecaluria or pneumaturia previously. States she is tolerating a diet.  Reports minimal abdominal pain    Abdomen - soft, non distended, large pannus, non tender  catherine in place with feculent drainage    labs and images reviewed    Plan:  - Given the patient's cancer burden and advanced disease, she is a very poor surgical candidate.  Recommend goals of care discussion.  If patient is amenable, we could offer a palliative diverting loop ileostomy although it would be high risk.  - appreciate palliative care evaluation  - IV abx  - GI consult for possible colonoscopic evaluation  - GYN Onc/ Med Onc evaluation  - colorectal surgery to follow
74F with PMH of cervical cancer s/p radiation therapy (20 years ago), breast cancer s/p lumpectomy/radiation (6 years ago), uterine cancer  s/p chemotherapy ( 3 years ago) with metastatic disease, COPD, DM, HTN, osteoporosis, Rt DVT, Anemia, CKD 3 presents to ED with R sided abdominal pain and diarrhea.  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  No previous colonoscopy.    Patient seen and examined.  Patient denies fecaluria or pneumaturia previously. States she is tolerating a diet.  Reports minimal abdominal pain    Abdomen - soft, non distended, large pannus, non tender  catherine in place with feculent drainage    labs and images reviewed    Plan:  - I had a long discussion with the patient regarding surgical options.  We discussed a palliative diverting loop ileostomy.  The patient understands that this would not address the pelvic mass and would not close the fistulous connection between the colon and bladder. She understands that this would be a permanent ileostomy.  We discussed the risks benefits and alternatives including stoma complications of leaking, prolapse and stricture.  Given the patient's cancer burden and advanced disease, she is a very poor surgical candidate.  At this time, the patient states that her urinary symptoms are mild and she wants to avoid a surgery and a permanent palliative diverting ileostomy.     - appreciate palliative care evaluation  - IV abx  - f/u GI colonoscopic evaluation  - GYN Onc/ Med Onc evaluation  - colorectal surgery to sign off at this time.  Please reconsult as needed.
Patient seen and examined at bedside. Patient denies any complaints. Denies any pain, appears comfortable. Tolerating diet. Amin bag has feculent fluid. Patient is undecided on whether she wants to pursue surgery. She does not want hospice even though she understands her disease is advanced and terminal. She wants to remain full code. Palliative consult pending. Will attempt to set up family meeting.     Agree with resident's note above. Note adjusted where appropriate.     #Progress Note Handoff  Pending (specify):  palliative consult   Family discussion: socorro pt plan as above   Disposition: TBD
Patient seen and examined. Continues to have diarrhea, C. diff negative, start Imodium PRN. Will start clears tomorrow and prep for colonoscopy tomorrow. Patient having no urine in the catherine bag, catherine replaced however continues to have leakage, urology aware. Monitor kidney function, c/w IVF.     Rest of plan as above.   Poor prognosis.     #Progress Note Handoff  Pending (specify):  colonoscopy Monday   Family discussion: dw pt plan as above   Disposition: Home
Patient seen and examined. No complaints. No events overnight.     #Progress Note Handoff  Pending (specify):  Colonoscopy Monday, surgery next week   Family discussion: dw pt plan as above   Disposition: Home
I personally interviewed and examined the patient.Patient needs a colonoscopy However patient was refusing colonoscopy till last Friday. Agreed to get it done but did not drink prep for today,Agreeable for Mg Citrate prep for tomorrow.
Patient seen at bedside today  She opens and eyes and is attentive, but does not respond vocally  Yesterday, patient become more lethargic and less responsive with evidence of worsening creatinine in the setting of a displaced catherine  Per primary team discussion, patient's HCP, Temitope, made the patient DNR/DNI, comfort measures only, with plan for discharge home to hospice in the next 24-48 hours  Per discussion with primary team, no plan for palliative intervention for worsening renal dysfunction  As long as patient can take PO, recommend oxycodone 5mg PO q4h PRN for pain  Palliative care will continue to follow
Patient seen and examined. Reports nausea, vomited after drinking Golytely yesterday, only drank 10%. Colonoscopy scheduled for today was cancelled due to the patient's intolerance to bowel prep. She will be prepped again with mag citrate and rescheduled for colonoscopy tomorrow. Patient refusing surgery currently after initially agreeing. Surgery has signed off.     #Progress Note Handoff  Pending (specify):  colonoscopy tomorrow, ID f/u   Family discussion: socorro pt plan as above   Disposition: Home
Patient seen and examined. She reports nausea, diarrhea, fatigue. She drank the mag citrate, however she vomited afterward and refused enemas. GI cancelled colonoscopy. She refused surgery. No further inpatient work up need. ID recommended PO abx. Urology recommended to keep the catherine in for now and follow up as outpatient. She is agreeable for discharge back to [facility].     #Progress Note Handoff  Pending (specify):  STR placement   Family discussion: dw pt plan as above   Disposition: STR

## 2020-07-24 NOTE — PROGRESS NOTE ADULT - SUBJECTIVE AND OBJECTIVE BOX
Brief HPI  74 year old woman with history of uterine cancer 20 years ago s/p radiation, history of breast cancer s/p lumpectomy + radiation,  COPD presented with diarrhea and abdominal pain. Hospital course complicated by evidence of hydronephrosis in the setting of likely obstruction, evidence of an obstructing necrotic pelvic mass, and evidence of a colovesical fistula.  Palliative care consulted for GOC.    Interval History  -Patient now comfort measures only, awaiting disposition to hospice  -Patient seen at bedside  -She is alert, but only vocalizes sounds to questions; She doesn't speak      PHYSICAL EXAM    T(C): --  T(F): --  HR: --  BP: --  RR: --  SpO2: --    GEN:  NAD, lying in bed, sleeping  HEENT:  EOMI, no scleral icterus  Lungs: no wheezing, no accessory muscle use  Neuro: patient makes sounds/grunts to questions, but doesn't speak  Skin: no rashes noted          LABS:                                                                                                                                               MEDICATIONS  (STANDING):  apixaban 5 milliGRAM(s) Oral every 12 hours  chlorhexidine 4% Liquid 1 Application(s) Topical <User Schedule>  lactated ringers. 1000 milliLiter(s) (100 mL/Hr) IV Continuous <Continuous>  latanoprost 0.005% Ophthalmic Solution 1 Drop(s) Both EYES at bedtime  montelukast 10 milliGRAM(s) Oral daily  saccharomyces boulardii 250 milliGRAM(s) Oral two times a day    MEDICATIONS  (PRN):  ALBUTerol    90 MICROgram(s) HFA Inhaler 2 Puff(s) Inhalation every 6 hours PRN Bronchospasm  diphenhydrAMINE 25 milliGRAM(s) Oral every 4 hours PRN Rash and/or Itching  LORazepam   Injectable 0.5 milliGRAM(s) IV Push every 8 hours PRN Anxiety  morphine  - Injectable 2 milliGRAM(s) IV Push every 6 hours PRN Moderate Pain (4 - 6)  ondansetron Injectable 4 milliGRAM(s) IV Push every 6 hours PRN Nausea and/or Vomiting

## 2020-07-24 NOTE — PROGRESS NOTE ADULT - SUBJECTIVE AND OBJECTIVE BOX
SUBJECTIVE:    Patient is a 74y old Female who presents with a chief complaint of abdominal pain (23 Jul 2020 16:21)    Currently admitted to medicine with the primary diagnosis of Metastatic cancer to bone     Today is hospital day 13d. This morning she is resting comfortably in bed and reports no new issues or overnight events. Patient appears less responsive than previously. She is not talking or following simple commands. She does open her eyes and grunts.    PAST MEDICAL & SURGICAL HISTORY  Osteoporosis  Obesity: bmi 41  COPD (chronic obstructive pulmonary disease)  Asthma: last attack 1/2018- hospitalized&amp; steroids  Diabetes  Hypertension  Breast cancer: left, radiation ~2 yrs ago  Cervical cancer: radiation&amp; chemo ~15 yrs ago  History of appendectomy  History of lumpectomy of left breast    SOCIAL HISTORY:  Negative for smoking/alcohol/drug use.     ALLERGIES:  iodinated radiocontrast agents (Other)  penicillin (Unknown)    MEDICATIONS:  STANDING MEDICATIONS  apixaban 5 milliGRAM(s) Oral every 12 hours  chlorhexidine 4% Liquid 1 Application(s) Topical <User Schedule>  lactated ringers. 1000 milliLiter(s) IV Continuous <Continuous>  latanoprost 0.005% Ophthalmic Solution 1 Drop(s) Both EYES at bedtime  montelukast 10 milliGRAM(s) Oral daily  saccharomyces boulardii 250 milliGRAM(s) Oral two times a day    PRN MEDICATIONS  ALBUTerol    90 MICROgram(s) HFA Inhaler 2 Puff(s) Inhalation every 6 hours PRN  diphenhydrAMINE 25 milliGRAM(s) Oral every 4 hours PRN  LORazepam   Injectable 0.5 milliGRAM(s) IV Push every 8 hours PRN  morphine  - Injectable 2 milliGRAM(s) IV Push every 6 hours PRN  ondansetron Injectable 4 milliGRAM(s) IV Push every 6 hours PRN    VITALS:   T(F): --  HR: --  BP: --  RR: --  SpO2: --    LABS:                    Culture - Blood (collected 22 Jul 2020 14:00)  Source: .Blood Blood  Preliminary Report (24 Jul 2020 01:01):    No growth to date.          RADIOLOGY:    PHYSICAL EXAM:  GEN: No acute distress  LUNGS: Clear to auscultation bilaterally   HEART: Regular  ABD: Soft, non-tender, non-distended.  EXT: NC/NC/NE/2+PP/LUND/Skin Intact.   NEURO: AAOX3    Intravenous access:   NG tube:   Amin Catheter:   Indwelling Urethral Catheter:     Connect To:  Straight Drainage/[place]    Indication:  Urologic Procedure,  Surgery (07-13-20 @ 04:58) (not performed) [active]  Indwelling Urethral Catheter:     Connect To:  Straight Drainage/[place]    Indication:  Urinary Retention / Obstruction (07-18-20 @ 00:53) (not performed) [active]  Indwelling Urethral Catheter:     Connect To:  Straight Drainage/[place]    Indication:  Urine Output Monitoring in Critically Ill (07-18-20 @ 11:03) (not performed) [active]  Indwelling Urethral Catheter:     Connect To:  Straight Drainage/[place]    Indication:  Urinary Retention / Obstruction (07-21-20 @ 09:00) (not performed) [active] SUBJECTIVE:    Patient is a 74y old Female who presents with a chief complaint of abdominal pain (23 Jul 2020 16:21)    Currently admitted to medicine with the primary diagnosis of Metastatic cancer to bone     Today is hospital day 13d. This morning she is resting comfortably in bed and reports no new issues or overnight events. Patient appears less responsive than previously. She is not talking or following simple commands. She does open her eyes and grunts.    PAST MEDICAL & SURGICAL HISTORY  Osteoporosis  Obesity: bmi 41  COPD (chronic obstructive pulmonary disease)  Asthma: last attack 1/2018- hospitalized&amp; steroids  Diabetes  Hypertension  Breast cancer: left, radiation ~2 yrs ago  Cervical cancer: radiation&amp; chemo ~15 yrs ago  History of appendectomy  History of lumpectomy of left breast    SOCIAL HISTORY:  Negative for smoking/alcohol/drug use.     ALLERGIES:  iodinated radiocontrast agents (Other)  penicillin (Unknown)    MEDICATIONS:  STANDING MEDICATIONS  apixaban 5 milliGRAM(s) Oral every 12 hours  chlorhexidine 4% Liquid 1 Application(s) Topical <User Schedule>  lactated ringers. 1000 milliLiter(s) IV Continuous <Continuous>  latanoprost 0.005% Ophthalmic Solution 1 Drop(s) Both EYES at bedtime  montelukast 10 milliGRAM(s) Oral daily  saccharomyces boulardii 250 milliGRAM(s) Oral two times a day    PRN MEDICATIONS  ALBUTerol    90 MICROgram(s) HFA Inhaler 2 Puff(s) Inhalation every 6 hours PRN  diphenhydrAMINE 25 milliGRAM(s) Oral every 4 hours PRN  LORazepam   Injectable 0.5 milliGRAM(s) IV Push every 8 hours PRN  morphine  - Injectable 2 milliGRAM(s) IV Push every 6 hours PRN  ondansetron Injectable 4 milliGRAM(s) IV Push every 6 hours PRN    Culture - Blood (collected 22 Jul 2020 14:00)  Source: .Blood Blood  Preliminary Report (24 Jul 2020 01:01):  No growth to date.    PHYSICAL EXAM:  GEN: No acute distress  LUNGS: Clear to auscultation bilaterally   HEART: Regular  ABD: Soft, non-tender, non-distended.  EXT: NC/NC/NE/2+PP/LUND/Skin Intact.   NEURO: AAOX3    Intravenous access:   NG tube:   Amin Catheter:   Indwelling Urethral Catheter:     Connect To:  Straight Drainage/[place]    Indication:  Urologic Procedure,  Surgery (07-13-20 @ 04:58) (not performed) [active]  Indwelling Urethral Catheter:     Connect To:  Straight Drainage/[place]    Indication:  Urinary Retention / Obstruction (07-18-20 @ 00:53) (not performed) [active]  Indwelling Urethral Catheter:     Connect To:  Straight Drainage/[place]    Indication:  Urine Output Monitoring in Critically Ill (07-18-20 @ 11:03) (not performed) [active]  Indwelling Urethral Catheter:     Connect To:  Straight Drainage/[place]    Indication:  Urinary Retention / Obstruction (07-21-20 @ 09:00) (not performed) [active]

## 2020-07-24 NOTE — PROGRESS NOTE ADULT - ASSESSMENT
Consult Summary  74 year old woman with history of uterine cancer 20 years ago s/p radiation, history of breast cancer s/p lumpectomy + radiation,  COPD presented with diarrhea and abdominal pain. Hospital course complicated by evidence of hydronephresis in the setting of likely obstruction, evidence of an obstructing necrotic pelvic mass, and evidence of a colovesical fistula.  Palliative care consulted for GO.    Patient now comfort measures only per primary team discussion with HCP, awaiting disposition to hospice. Patient seen at bedside. She is alert, but only vocalizes sounds to questions; She doesn't speak. No PRN use in 24 hours.    Morphine Equivalent Daily Dose (MEDD):  NA    Recommendations:  - DNR/DNI, comfort measures only per primary team discussion with HCP Temitope Green  - awaiting disposition to hospice  - per primary team, continuing IVF for now  - if patient develops dyspnea or increased secretions over the weekend, strongly suggest discontinuing IVF  - continue lorazepam and zofran PRN per primary team  - given renal failure, recommend stopping morphine and switching to dilaudid 0.5mg IV q4h PRN for dyspnea/pain  - palliative care will continue to follow     Please Call [phone] PRN

## 2020-07-24 NOTE — PROGRESS NOTE ADULT - ASSESSMENT
75 yo F with PMH of cervical cancer s/p radiation therapy (20 years ago), breast cancer (ER+ / [place]+) s/p lumpectomy + radiation thereby (6 years ago), uterine cancer s/p chemotherapy (3 years ago) as per patient, 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     #RLQ/suprapubic pain 2/2 obstructive pelvic mass  #Right sided hydronephrosis 2/2 pelvic mass with necrosis   #Colovesical fistula  #DESTIN  #leukocytosis, possible pyelonephritis  #chronic anemia likely 2/2 iron deficiency  -d/c antibiotics  -stop vitals  -c/w IV fluids as per cousin. Confirmed today 7/24  - Patient is DNR/DNI  - Comfort care  - Spoke to HCP Temitope Green and patient    - MOLST Form in chart  - Hospice Consult placed. Possible dispo Tuesday 73 yo F with PMH of cervical cancer s/p radiation therapy (20 years ago), breast cancer (ER+ / [place]+) s/p lumpectomy + radiation thereby (6 years ago), uterine cancer s/p chemotherapy (3 years ago) as per patient, 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     #RLQ/suprapubic pain 2/2 obstructive pelvic mass  #Right sided hydronephrosis 2/2 pelvic mass with necrosis   #Colovesical fistula  #DESTIN  #leukocytosis, possible pyelonephritis  #chronic anemia likely 2/2 iron deficiency  -d/c antibiotics  -stop vitals  -c/w IV fluids as per cousin. Confirmed today 7/24  - Patient is DNR/DNI  - Comfort care  - Spoke to HCP Temitope Green and patient    - MOLST Form in chart  - Hospice Consult placed. Possible dispo Tuesday    Attending Attestation    Pt has been seen and examined. Case and Plan discussed at rounds and agree with above documentation as reviewed.   Pt is actively dying and is awaiting Facility Hospice Care Approval per CM likely next week.  DNR/DNI  MOLST in Chart   HCP does not want any intervention and only wants comfort care    Dispo: Facility Hospice d/c planning however guarded prognosis and may pass away before next week. We are in touch w/ HCP and Palliative Care Team.

## 2020-07-25 NOTE — PROGRESS NOTE ADULT - ASSESSMENT
75 yo F with PMH of cervical cancer s/p radiation therapy (20 years ago), breast cancer (ER+ / [place]+) s/p lumpectomy + radiation thereby (6 years ago), uterine cancer s/p chemotherapy (3 years ago) as per patient, 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 Patient is currently in comfort care, awaiting hospice placement.     #RLQ/suprapubic pain 2/2 obstructive pelvic mass  #Right sided hydronephrosis 2/2 pelvic mass with necrosis   #Colovesical fistula  #DESTIN  #leukocytosis, possible pyelonephritis  #chronic anemia likely 2/2 iron deficiency  -d/c antibiotics  -stop vitals  -c/w IV fluids as per cousin. Confirmed yesterday 7/24  - Patient is DNR/DNI  - Comfort care  - Spoke to HCP Temitope Green and patient    - MOLST Form in chart  - Hospice Consult placed. Possible dispo Tuesday  - Patient receiving Dilaudid IV for pain, did not complain of any pain today 7/25 73 yo F with PMH of cervical cancer s/p radiation therapy (20 years ago), breast cancer (ER+ / [place]+) s/p lumpectomy + radiation thereby (6 years ago), uterine cancer s/p chemotherapy (3 years ago) as per patient, 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 Patient is currently in comfort care, awaiting hospice placement.     #RLQ/suprapubic pain 2/2 obstructive pelvic mass  #Right sided hydronephrosis 2/2 pelvic mass with necrosis   #Colovesical fistula  #DESTIN  #leukocytosis, possible pyelonephritis  #chronic anemia likely 2/2 iron deficiency  -d/c antibiotics  -stop vitals  -c/w IV fluids as per cousin. Confirmed yesterday 7/24  - Patient is DNR/DNI  - Comfort care  - Spoke to HCP eTmitope Green and patient  - MOLST Form in chart  - Hospice Consult placed. Possible dispo Tuesday  - Patient receiving Dilaudid IV for pain, did not complain of any pain today 7/25    Attending Attestation  Pt has been seen and examined. Case and Plan discussed at rounds and agree with above documentation as reviewed.  Pt is actively passing away.   Overall poor prognosis.  H/O metastatic Breast CA, Uterine CA, Bone Mets complicated with Colovescicular fistula / Sepsis / Needs chronic catherine per  / HCP declined intervention and pt has been on Hospice Care Status / Comfort Care Only / No Abx or procedures or labs per HCP / Palliative Team following /   Keep comfortable   Family visitation allowed anytime w/ Nursing arrangements     DNR/DNI  Dismal Prognosis     Dispo: Actively passing away / Facility Hospice scheduled for Monday 73 yo F with PMH of cervical cancer s/p radiation therapy (20 years ago), breast cancer (ER+ / ID+) s/p lumpectomy + radiation thereby (6 years ago), uterine cancer s/p chemotherapy (3 years ago) as per patient, 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 Patient is currently in comfort care, awaiting hospice placement.     #RLQ/suprapubic pain 2/2 obstructive pelvic mass  #Right sided hydronephrosis 2/2 pelvic mass with necrosis   #Colovesical fistula  #DESTIN  #leukocytosis, possible pyelonephritis  #chronic anemia likely 2/2 iron deficiency  -d/c antibiotics  -stop vitals  -c/w IV fluids as per cousin. Confirmed yesterday 7/24  - Patient is DNR/DNI  - Comfort care  - Spoke to HCP Temitope Green and patient  - MOLST Form in chart  - Hospice Consult placed. Possible dispo Tuesday  - Patient receiving Dilaudid IV for pain, did not complain of any pain today 7/25    Attending Attestation  Pt has been seen and examined. Case and Plan discussed at rounds and agree with above documentation as reviewed.  Pt is actively passing away.   Overall poor prognosis.  H/O metastatic Breast CA, Uterine CA, Bone Mets complicated with Colovescicular fistula / Hydronephrosis / Sepsis / DESTIN / Needs chronic catherine per  / HCP declined intervention and pt has been on Hospice Care Status / Comfort Care Only / No Abx or procedures or labs per HCP / Palliative Team following /   Keep comfortable   Family visitation allowed anytime w/ Nursing arrangements     DNR/DNI  Dismal Prognosis     Dispo: Actively passing away / Facility Hospice scheduled for Monday

## 2020-07-25 NOTE — PROGRESS NOTE ADULT - SUBJECTIVE AND OBJECTIVE BOX
RILEY MARIE 74y Female  MRN#: [id number]   Hospital Day: 14d    SUBJECTIVE  Patient is a 74y old Female who presents with a chief complaint of abdominal pain (24 Jul 2020 17:03)  Currently admitted to medicine with the primary diagnosis of Metastatic cancer to bone    INTERVAL HPI AND OVERNIGHT EVENTS:  Patient was examined and seen at bedside. Today is hospital day 14, patient was sleeping and was difficult to arouse. When awoken patient did not respond to the questions in the morning, just wished to go back to sleep. Patient is being followed by palliative care and is on comfort measures awaiting hospice placement.     OBJECTIVE  PAST MEDICAL & SURGICAL HISTORY  Osteoporosis  Obesity: bmi 41  COPD (chronic obstructive pulmonary disease)  Asthma: last attack 1/2018- hospitalized&amp; steroids  Diabetes  Hypertension  Breast cancer: left, radiation ~2 yrs ago  Cervical cancer: radiation&amp; chemo ~15 yrs ago  History of appendectomy  History of lumpectomy of left breast    ALLERGIES:  iodinated radiocontrast agents (Other)  penicillin (Unknown)    MEDICATIONS:  STANDING MEDICATIONS  apixaban 5 milliGRAM(s) Oral every 12 hours  chlorhexidine 4% Liquid 1 Application(s) Topical <User Schedule>  lactated ringers. 1000 milliLiter(s) IV Continuous <Continuous>  latanoprost 0.005% Ophthalmic Solution 1 Drop(s) Both EYES at bedtime  montelukast 10 milliGRAM(s) Oral daily  saccharomyces boulardii 250 milliGRAM(s) Oral two times a day    PRN MEDICATIONS  ALBUTerol    90 MICROgram(s) HFA Inhaler 2 Puff(s) Inhalation every 6 hours PRN  diphenhydrAMINE 25 milliGRAM(s) Oral every 4 hours PRN  HYDROmorphone  Injectable 0.5 milliGRAM(s) IV Push every 4 hours PRN  LORazepam   Injectable 0.5 milliGRAM(s) IV Push every 8 hours PRN  ondansetron Injectable 4 milliGRAM(s) IV Push every 6 hours PRN      VITAL SIGNS: Last 24 Hours      LABS:      RADIOLOGY:    < from: US Kidney and Bladder (07.22.20 @ 12:46) >  IMPRESSION:    Moderate hydronephrosis of the right kidney, unchanged from previous exam dating 7/12/2020.    Amin catheter and balloon appear outside of the bladder, clinical correlation is required.      < end of copied text >      < from: US Abdomen Limited (07.12.20 @ 11:40) >  IMPRESSION:    1.  1.2 x 1.9 cm hypoechoic lesion in the left lobe of the liver. This issuspicious for a metastatic focus. Recommend MRI of the abdomen with IV contrast for further characterization.    2.  Moderate hydronephrosis of the right kidney, which has increased in size when compared to previous ultrasound dated 1/23/2018.    3. Right renal calyceal diverticulum, stable.    4.  Mild to moderate perihepatic ascites.      < end of copied text >    PHYSICAL EXAM:  CONSTITUTIONAL: No acute distress  HEAD: Atraumatic, normocephalic  EYES: EOM intact, PERRLA, conjunctiva and sclera clear  ENT: Supple, no masses, no thyromegaly, no bruits, no JVD; moist mucous membranes  PULMONARY: Clear to auscultation bilaterally; no wheezes, rales, or rhonchi  CARDIOVASCULAR: Regular rate and rhythm; no murmurs, rubs, or gallops  GASTROINTESTINAL: Soft, non-tender, non-distended; bowel sounds present  MUSCULOSKELETAL: 2+ peripheral pulses; no clubbing, no cyanosis, no edema  NEUROLOGY: non-focal  SKIN: No rashes or lesions; warm and dry  : Amin in place, no hematuria

## 2020-07-26 NOTE — PROGRESS NOTE ADULT - PROVIDER SPECIALTY LIST ADULT
Colorectal Surgery
Gastroenterology
Hospitalist
Hospitalist
Infectious Disease
Internal Medicine
Intervent Radiology
Intervent Radiology
Palliative Care
Surgery
Urology
Palliative Care

## 2020-07-26 NOTE — DISCHARGE NOTE FOR THE EXPIRED PATIENT - HOSPITAL COURSE
73 yo F with PMH of cervical cancer s/p radiation therapy (20 years ago), breast cancer (ER+ / [place]+) s/p lumpectomy + radiation thereby (6 years ago), uterine cancer  s/p chemotherapy ( 3 years ago) as per patient, metastasis to bones, COPD not on home oxygen, DM, HTN, osteoporosis, DVT on eliquis, Anemia of chronic disease, CKD 3 presents to ED with gradual onset, episodic, 7/10 RLQ abdominal pain radiating to groin for past 2 days, with no aggravating or relieving factors. Pt reports 100lb weight loss in past 1 year due to malignant cancer. Pt also reports persistent non bloody, non mucoid, watery diarrhea for past 3 weeks, 4-5 episodes per day, not associated with food intake. In the ED, vitals were WNL,  Pt received 1L fluid bolus, CT A/P showed severe right hydronephrosis and dilatation of R ureter suggesting obstruction  due to encasement of right ureter. Urology was consulted, recommended no acute intervention at this time. Amin drained feces concerning for colovescical fistula. Surgery discussed with the patient the possibility of diversion ostomy. Palliative care spoke to the patient about her overall prognosis and to help her decide about surgery. Patient was agreeable to surgery but was unable to tolerate the colonoscopy prep x2 so procedure was not done. Of note patient had leukocytosis secondary to a likely pylnonephritis. Goals of care discussion had with patient and health care proxy Temitope. Decision was made to hold all antibiotics, vital checks, and labs. Patient  on 20 at 14:28

## 2020-07-26 NOTE — PROGRESS NOTE ADULT - ASSESSMENT
73 yo F with PMH of cervical cancer s/p radiation therapy (20 years ago), breast cancer (ER+ / [place]+) s/p lumpectomy + radiation thereby (6 years ago), uterine cancer s/p chemotherapy (3 years ago) as per patient, 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 Patient is currently in comfort care, awaiting hospice placement.     #RLQ/suprapubic pain 2/2 obstructive pelvic mass  #Right sided hydronephrosis 2/2 pelvic mass with necrosis   #Colovesical fistula  #DESTIN  #leukocytosis, possible pyelonephritis  #chronic anemia likely 2/2 iron deficiency  -d/c antibiotics  -stop vitals  - c/w IV fluids as per cousin. Confirmed 7/24 -->Stopped fluids as patient is retaining  - Consulted urology for catherine placement  - Patient is DNR/DNI  - Comfort care  - Spoke to HCP Temitope Green and patient  - MOLST Form in chart  - Hospice Consult placed. Possible dispo Tuesday  - Patient receiving Dilaudid IV for pain 75 yo F with PMH of cervical cancer s/p radiation therapy (20 years ago), breast cancer (ER+ / [place]+) s/p lumpectomy + radiation thereby (6 years ago), uterine cancer s/p chemotherapy (3 years ago) as per patient, 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 Patient is currently in comfort care, awaiting hospice placement.     #RLQ/suprapubic pain 2/2 obstructive pelvic mass  #Right sided hydronephrosis 2/2 pelvic mass with necrosis   #Colovesical fistula  #DESTIN  #leukocytosis, possible pyelonephritis  #chronic anemia likely 2/2 iron deficiency  -d/c antibiotics  -stop vitals  - c/w IV fluids as per cousin. Confirmed 7/24   - Stopped fluids as patient is retaining --> Patient not retaining as per urology just third spacing --> Restarted fluids  - Patient is DNR/DNI  - Comfort care  - Spoke to HCP Temitope Green and patient  - MOLST Form in chart  - Hospice Consult placed. Possible dispo Tuesday  - Patient receiving Dilaudid IV for pain 73 yo F with PMH of cervical cancer s/p radiation therapy (20 years ago), breast cancer (ER+ / [place]+) s/p lumpectomy + radiation thereby (6 years ago), uterine cancer s/p chemotherapy (3 years ago) as per patient, 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 Patient is currently in comfort care, awaiting hospice placement.     #RLQ/suprapubic pain 2/2 obstructive pelvic mass  #Right sided hydronephrosis 2/2 pelvic mass with necrosis   #Colovesical fistula  #DESTIN  #leukocytosis, possible pyelonephritis  #chronic anemia likely 2/2 iron deficiency  -d/c antibiotics  -stop vitals  - c/w IV fluids as per cousin. Confirmed    - Stopped fluids as patient is retaining --> Patient not retaining as per urology just third spacing --> Restarted fluids  - Patient is DNR/DNI  - Comfort care  - Spoke to HCP Temitope Green and patient  - MOLST Form in chart  - Hospice Consult placed. Possible dispo Tuesday  - Patient receiving Dilaudid IV for pain    Attending Attestation:  Pt has been seen and examined this am and has been actively passing away.   At this time she has   Case was discussed with Dr. Frye this morning  Urology today said no intervention w/ catherine catheter  Temitope - HCP aware of expiration

## 2020-07-26 NOTE — PROVIDER CONTACT NOTE (OTHER) - ACTION/TREATMENT ORDERED:
md made aware
MD Frye made aware and will come assess pt.
MD Perkins made aware. He said urology is also aware. RN will keep track of how many times the patient wets the bed as an incontinence count.
Move Metoprolol 25 mg to 8 am, reassess BP then.
Reassess BP in 1 hour as per MD Gonzalez
awaiting intervention- instructed to keep LR at 100/hr
awaiting order to flush catherine catheter
As per MD no intervention is needed at this time.
As per MD no intervention is needed at this time.
As per MD, hold metoprolol and give enalapril
As per MD, it is ok to give PO medications this morning.
Md made aware. No further action required. Continue to monitor.

## 2020-07-26 NOTE — PROVIDER CONTACT NOTE (OTHER) - DATE AND TIME:
22-Jul-2020 22:00
12-Jul-2020 06:00
12-Jul-2020 20:30
13-Jul-2020 00:49
13-Jul-2020 04:56
13-Jul-2020 05:55
17-Jul-2020 05:05
17-Jul-2020 05:57
18-Jul-2020 06:12
18-Jul-2020 16:39
22-Jul-2020 00:00
25-Jul-2020
25-Jul-2020
25-Jul-2020 16:25
25-Jul-2020 18:32
26-Jul-2020 14:00

## 2020-07-26 NOTE — PROGRESS NOTE ADULT - REASON FOR ADMISSION
abdominal pain
abdominal pain/colovesical fistula
abdominal pain

## 2020-07-26 NOTE — PROGRESS NOTE ADULT - SUBJECTIVE AND OBJECTIVE BOX
SUBJECTIVE:    Patient is a 74y old Female who presents with a chief complaint of abdominal pain (25 Jul 2020 11:58)    Currently admitted to medicine with the primary diagnosis of Metastatic cancer to bone     Today is hospital day 15d. Yesterday patient was found to be retaining fluid so IV fluids was stopped. This morning was difficult to arouse. When awoken patient did not respond to the questions in the morning, just wished to go back to sleep. Patient is being followed by palliative care and is on comfort measures awaiting hospice placement.     PAST MEDICAL & SURGICAL HISTORY  Osteoporosis  Obesity: bmi 41  COPD (chronic obstructive pulmonary disease)  Asthma: last attack 1/2018- hospitalized&amp; steroids  Diabetes  Hypertension  Breast cancer: left, radiation ~2 yrs ago  Cervical cancer: radiation&amp; chemo ~15 yrs ago  History of appendectomy  History of lumpectomy of left breast    SOCIAL HISTORY:  Negative for smoking/alcohol/drug use.     ALLERGIES:  iodinated radiocontrast agents (Other)  penicillin (Unknown)    MEDICATIONS:  STANDING MEDICATIONS  apixaban 5 milliGRAM(s) Oral every 12 hours  chlorhexidine 4% Liquid 1 Application(s) Topical <User Schedule>  latanoprost 0.005% Ophthalmic Solution 1 Drop(s) Both EYES at bedtime  montelukast 10 milliGRAM(s) Oral daily  saccharomyces boulardii 250 milliGRAM(s) Oral two times a day    PRN MEDICATIONS  ALBUTerol    90 MICROgram(s) HFA Inhaler 2 Puff(s) Inhalation every 6 hours PRN  diphenhydrAMINE 25 milliGRAM(s) Oral every 4 hours PRN  HYDROmorphone  Injectable 0.5 milliGRAM(s) IV Push every 4 hours PRN  LORazepam   Injectable 0.5 milliGRAM(s) IV Push every 8 hours PRN  ondansetron Injectable 4 milliGRAM(s) IV Push every 6 hours PRN    VITALS:   T(F): 96.9  HR: 127  BP: 98/62  RR: 18  SpO2: 95%    LABS:                        RADIOLOGY:    PHYSICAL EXAM:  GEN: No acute distress  LUNGS: Clear to auscultation bilaterally   HEART: Regular  ABD: Soft, non-tender, non-distended.  EXT: NC/NC/NE/2+PP/LUND/Skin Intact.   NEURO: AAOX3    Intravenous access:   NG tube:   Amin Catheter:   Indwelling Urethral Catheter:     Connect To:  Straight Drainage/[place]    Indication:  Urologic Procedure,  Surgery (07-13-20 @ 04:58) (not performed) [active]  Indwelling Urethral Catheter:     Connect To:  Straight Drainage/[place]    Indication:  Urinary Retention / Obstruction (07-18-20 @ 00:53) (not performed) [active]  Indwelling Urethral Catheter:     Connect To:  Straight Drainage/[place]    Indication:  Urine Output Monitoring in Critically Ill (07-18-20 @ 11:03) (not performed) [active]  Indwelling Urethral Catheter:     Connect To:  Straight Drainage/[place]    Indication:  Urinary Retention / Obstruction (07-25-20 @ 15:22) (not performed) [active]

## 2020-07-26 NOTE — PROVIDER CONTACT NOTE (OTHER) - SITUATION
pt refused eye drops
MD Tilley made aware said it was okay to give pt metoprolol.
MD aware pt urine output is 300 and input is 1200cc. Instructed to bladder scan= 456.
MD made aware only 300 output in catheter and pt receiving LR at 100/hr. Instructed to irrigate catherine. Irrigated- 2 clots pulled out. No further draining from catherine.
MD notified pt urine output is scarce and stool in color. No further interventions at this time.
New catherine was inserted this morning. Urine output on catherine remains empty, but the patient's bed continues to get wet.
PT bp 91/50, pule 94. Do you want me to hold 6 am dose of 25 mg metoprolol?
Pt /53 HR 66, O2 sat 93%. Pt is receiving IVF as ordered.
Pt /56  upon arrival to the unit. Pt is receiving LR @100, pt stated she does not feel dizzy.
Pt BP upon reassement is 91/51. pulse 97.
Pt Bp is BP  83/56 
Pt is NPO at this time and scheduled to receive morning medications.
RN notified MD Frye patient vomitted black. Pt is DNR/DNI and comfort measures only.
bladder scan done pt retaining 753ml of urine
patient retaining fluid no catherine output, md states to cont to run 100 ml/hr at 6pm to bladder scan.
pt /50 . Pt is due for metoprolol and enalilpril

## 2020-07-28 LAB
CULTURE RESULTS: SIGNIFICANT CHANGE UP
SPECIMEN SOURCE: SIGNIFICANT CHANGE UP

## 2020-08-26 NOTE — CDI QUERY NOTE - NSCDIOTHERTXTBX_GEN_ALL_CORE_HH
Pt. w/ history of Breast and Uterine Cancer with mets to bone, here with abdominal pain.  Found to have necrotic Pelvic mass.    Pt. was given Solu-Medrol 125 mg IVP x1 on 7/11.  Pt.’s current medications include Tamoxifen 20mg po    US of 7/11 states:  “IIMPRESSION:  1.	1.2 x 1.9 cm hypoechoic lesion in the left lobe of the liver. This is suspicious for a metastatic focus. Recommend MRI of the abdomen with IV contrast for further characterization.”    H&P states:  8) Unsure what, if any, treatment or w/u pt is receiving for her malignancy:  --- Hepatic lesion, ascites and aforementioned could be progression of malignancy  --- She told me that she has not had recent chemotherapy  --- Out-pt records from pt's Onc Dr. Taco Smith at [facility]  …and…    “Prior to d/c, need to address the hydroureteronephrosis, abd pain and need to ascertain if there is an appropriate plan of care in place in regards to the patient's malignancy (given that she could have possible progression of disease)”    Attending Progress note of 7/22:  “Possible Metastatic CA”    D/C note states:   “Diagnosis: Metastatic cancer to bone  Assessment and Plan of Treatment: You have metastatic uterine cancer to the bone and a history of breast and cervical cancer. On this admission you were found to have a colovesical fistula and hydronephrosis. The decision was made in concert with your health care proxy to stop all treatment and go to hospice. You are DNR/DNI”    Please document diagnosis significant with the etiology of the patient’s presenting symptoms:  -	 Abdominal pain 2/2 progression of metastasis of uterine cancer  -	 Other (please specify)  -	 Unable to determine

## 2021-02-10 NOTE — ED ADULT NURSE NOTE - NSIMPLEMENTINTERV_GEN_ALL_ED
Anesthetic History   No history of anesthetic complications            Review of Systems / Medical History  Patient summary reviewed, nursing notes reviewed and pertinent labs reviewed    Pulmonary    COPD               Neuro/Psych       CVA  TIA     Cardiovascular    Hypertension        Dysrhythmias : atrial fibrillation  Hyperlipidemia    Exercise tolerance: >4 METS  Comments: Stenosis of both internal carotid arteries   GI/Hepatic/Renal     GERD: well controlled    Renal disease: CRI  Hiatal hernia    Comments: dysphagia Endo/Other      Hypothyroidism  Cancer     Other Findings   Comments: Cervical myelopathy with cervical radiculopathy           Physical Exam    Airway  Mallampati: III  TM Distance: 4 - 6 cm  Neck ROM: normal range of motion, short neck   Mouth opening: Normal    Comments: May benefit from a glidescope or similar Cardiovascular  Regular rate and rhythm,  S1 and S2 normal,  no murmur, click, rub, or gallop  Rhythm: regular  Rate: normal         Dental  No notable dental hx       Pulmonary  Breath sounds clear to auscultation               Abdominal  GI exam deferred       Other Findings            Anesthetic Plan    ASA: 3  Anesthesia type: general and total IV anesthesia          Induction: Intravenous  Anesthetic plan and risks discussed with: Patient Implemented All Universal Safety Interventions:  [place] to call system. Call bell, personal items and telephone within reach. Instruct patient to call for assistance. Room bathroom lighting operational. Non-slip footwear when patient is off stretcher. Physically safe environment: no spills, clutter or unnecessary equipment. Stretcher in lowest position, wheels locked, appropriate side rails in place.

## 2022-08-18 NOTE — DIETITIAN INITIAL EVALUATION ADULT. - FACTORS AFF FOOD INTAKE
other (specify)/denies difficulty swallowing/chewing; abdominal discomfort noted, no bm since adm
Detail Level: Detailed
Depth Of Biopsy: dermis
Was A Bandage Applied: Yes
Size Of Lesion In Cm: 0
Biopsy Type: H and E
Biopsy Method: 15 blade
Anesthesia Type: 1% lidocaine with 1:200,000 epinephrine and a 1:10 solution of 8.4% sodium bicarbonate
Anesthesia Volume In Cc (Will Not Render If 0): 2
Hemostasis: Electrocautery
Wound Care: Petrolatum
Dressing: pressure dressing with telfa
Destruction After The Procedure: No
Type Of Destruction Used: Curettage
Curettage Text: The wound bed was treated with curettage after the biopsy was performed.
Cryotherapy Text: The wound bed was treated with cryotherapy after the biopsy was performed.
Electrodesiccation Text: The wound bed was treated with electrodesiccation after the biopsy was performed.
Electrodesiccation And Curettage Text: The wound bed was treated with electrodesiccation and curettage after the biopsy was performed.
Silver Nitrate Text: The wound bed was treated with silver nitrate after the biopsy was performed.
Lab: -[id number]
Consent: Written consent was obtained and risks were reviewed including but not limited to scarring, infection, bleeding, scabbing, incomplete removal, nerve damage and allergy to anesthesia.
Post-Care Instructions: I reviewed with the patient in detail post-care instructions. Patient is to keep the biopsy site dry overnight, and then apply bacitracin twice daily until healed. Patient may apply hydrogen peroxide soaks to remove any crusting.
Notification Instructions: Patient will be notified of biopsy results. However, patient instructed to call the office if not contacted within 2 weeks.
Billing Type: Third-Party Bill
Information: Selecting Yes will display possible errors in your note based on the variables you have selected. This validation is only offered as a suggestion for you. PLEASE NOTE THAT THE VALIDATION TEXT WILL BE REMOVED WHEN YOU FINALIZE YOUR NOTE. IF YOU WANT TO FAX A PRELIMINARY NOTE YOU WILL NEED TO TOGGLE THIS TO 'NO' IF YOU DO NOT WANT IT IN YOUR FAXED NOTE.

## 2022-09-28 NOTE — DIETITIAN INITIAL EVALUATION ADULT. - NS FNS WEIGHT USED FOR CALC
Saturation: Site=PA (Pulmonary Artery) , O2=43 %, Hgb=7.6 gm/dl, Condition=Condition 1. Used in calculation. current/CBW: 148#/67kg

## 2023-07-12 NOTE — CHART NOTE - NSCHARTNOTESELECT_GEN_ALL_CORE
Patient is in the supine position.   The body was positioned using the following devices: safety strap and blanket.  The head was positioned using the following devices: regular pillow.  The left arm was positioned using the following devices: safety strap, arm board, gel arm pads and blanket.  The right arm was positioned using the following devices: safety strap, arm board, gel arm pads and blanket.  The patient was positioned by Risa Lemos RN, Maryam Floyd RN, Lennie Dennis RN Palliative Care SW Note/Event Note